# Patient Record
Sex: FEMALE | Race: WHITE | NOT HISPANIC OR LATINO | URBAN - METROPOLITAN AREA
[De-identification: names, ages, dates, MRNs, and addresses within clinical notes are randomized per-mention and may not be internally consistent; named-entity substitution may affect disease eponyms.]

---

## 2018-02-18 ENCOUNTER — INPATIENT (INPATIENT)
Facility: HOSPITAL | Age: 78
LOS: 7 days | Discharge: HOME HEALTH | End: 2018-02-26
Attending: SURGERY

## 2018-02-18 VITALS
HEART RATE: 85 BPM | TEMPERATURE: 97 F | DIASTOLIC BLOOD PRESSURE: 67 MMHG | OXYGEN SATURATION: 95 % | RESPIRATION RATE: 20 BRPM | SYSTOLIC BLOOD PRESSURE: 149 MMHG

## 2018-02-18 DIAGNOSIS — Z98.890 OTHER SPECIFIED POSTPROCEDURAL STATES: Chronic | ICD-10-CM

## 2018-02-18 DIAGNOSIS — Z93.1 GASTROSTOMY STATUS: Chronic | ICD-10-CM

## 2018-02-18 LAB
ALBUMIN SERPL ELPH-MCNC: 3.8 G/DL — SIGNIFICANT CHANGE UP (ref 3–5.5)
ALP SERPL-CCNC: 116 U/L — HIGH (ref 30–115)
ALT FLD-CCNC: 22 U/L — SIGNIFICANT CHANGE UP (ref 0–41)
ANION GAP SERPL CALC-SCNC: 10 MMOL/L — SIGNIFICANT CHANGE UP (ref 7–14)
ANION GAP SERPL CALC-SCNC: 15 MMOL/L — HIGH (ref 7–14)
APTT BLD: 25 SEC — LOW (ref 27–39.2)
APTT BLD: 28.8 SEC — SIGNIFICANT CHANGE UP (ref 27–39.2)
AST SERPL-CCNC: 45 U/L — HIGH (ref 0–41)
BASE EXCESS BLDV CALC-SCNC: -2.8 MMOL/L — LOW (ref -2–2)
BASOPHILS # BLD AUTO: 0.04 K/UL — SIGNIFICANT CHANGE UP (ref 0–0.2)
BASOPHILS NFR BLD AUTO: 0.2 % — SIGNIFICANT CHANGE UP (ref 0–1)
BILIRUB SERPL-MCNC: 1.4 MG/DL — HIGH (ref 0.2–1.2)
BUN SERPL-MCNC: 11 MG/DL — SIGNIFICANT CHANGE UP (ref 10–20)
BUN SERPL-MCNC: 24 MG/DL — HIGH (ref 10–20)
CA-I SERPL-SCNC: 1.15 MMOL/L — SIGNIFICANT CHANGE UP (ref 1.12–1.3)
CALCIUM SERPL-MCNC: 8.2 MG/DL — LOW (ref 8.5–10.1)
CALCIUM SERPL-MCNC: 9.4 MG/DL — SIGNIFICANT CHANGE UP (ref 8.5–10.1)
CHLORIDE SERPL-SCNC: 102 MMOL/L — SIGNIFICANT CHANGE UP (ref 98–110)
CHLORIDE SERPL-SCNC: 107 MMOL/L — SIGNIFICANT CHANGE UP (ref 98–110)
CK MB CFR SERPL CALC: 1.5 NG/ML — SIGNIFICANT CHANGE UP (ref 0.6–6.3)
CO2 SERPL-SCNC: 19 MMOL/L — SIGNIFICANT CHANGE UP (ref 17–32)
CO2 SERPL-SCNC: 21 MMOL/L — SIGNIFICANT CHANGE UP (ref 17–32)
CREAT SERPL-MCNC: 0.7 MG/DL — SIGNIFICANT CHANGE UP (ref 0.7–1.5)
CREAT SERPL-MCNC: 0.9 MG/DL — SIGNIFICANT CHANGE UP (ref 0.7–1.5)
EOSINOPHIL # BLD AUTO: 0 K/UL — SIGNIFICANT CHANGE UP (ref 0–0.7)
EOSINOPHIL NFR BLD AUTO: 0 % — SIGNIFICANT CHANGE UP (ref 0–8)
GAS PNL BLDV: 140 MMOL/L — SIGNIFICANT CHANGE UP (ref 136–145)
GAS PNL BLDV: SIGNIFICANT CHANGE UP
GAS PNL BLDV: SIGNIFICANT CHANGE UP
GLUCOSE SERPL-MCNC: 160 MG/DL — HIGH (ref 70–110)
GLUCOSE SERPL-MCNC: 164 MG/DL — HIGH (ref 70–110)
HCO3 BLDV-SCNC: 24 MMOL/L — SIGNIFICANT CHANGE UP (ref 22–29)
HCT VFR BLD CALC: 26.7 % — LOW (ref 37–47)
HCT VFR BLD CALC: 33.4 % — LOW (ref 37–47)
HGB BLD CALC-MCNC: 10.3 G/DL — LOW (ref 14–18)
HGB BLD-MCNC: 10.6 G/DL — LOW (ref 14–18)
HGB BLD-MCNC: 8.6 G/DL — LOW (ref 14–18)
IMM GRANULOCYTES NFR BLD AUTO: 0.5 % — HIGH (ref 0.1–0.3)
INR BLD: 1.49 RATIO — HIGH (ref 0.65–1.3)
INR BLD: 2.33 RATIO — HIGH (ref 0.65–1.3)
LACTATE BLDV-MCNC: 4.8 MMOL/L — HIGH (ref 0.5–1.6)
LACTATE SERPL-SCNC: 4.8 MMOL/L — CRITICAL HIGH (ref 0.5–2.2)
LIDOCAIN IGE QN: 13 U/L — SIGNIFICANT CHANGE UP (ref 7–60)
LYMPHOCYTES # BLD AUTO: 1.91 K/UL — SIGNIFICANT CHANGE UP (ref 1.2–3.4)
LYMPHOCYTES # BLD AUTO: 9.7 % — LOW (ref 20.5–51.1)
MAGNESIUM SERPL-MCNC: 1.8 MG/DL — SIGNIFICANT CHANGE UP (ref 1.8–2.4)
MCHC RBC-ENTMCNC: 24.2 PG — LOW (ref 27–31)
MCHC RBC-ENTMCNC: 24.3 PG — LOW (ref 27–31)
MCHC RBC-ENTMCNC: 31.7 G/DL — LOW (ref 32–37)
MCHC RBC-ENTMCNC: 32.2 G/DL — SIGNIFICANT CHANGE UP (ref 32–37)
MCV RBC AUTO: 75.2 FL — LOW (ref 81–91)
MCV RBC AUTO: 76.4 FL — LOW (ref 81–91)
MONOCYTES # BLD AUTO: 1.1 K/UL — HIGH (ref 0.1–0.6)
MONOCYTES NFR BLD AUTO: 5.6 % — SIGNIFICANT CHANGE UP (ref 1.7–9.3)
NEUTROPHILS # BLD AUTO: 16.59 K/UL — HIGH (ref 1.4–6.5)
NEUTROPHILS NFR BLD AUTO: 84 % — HIGH (ref 42.2–75.2)
NRBC # BLD: 0 /100 WBCS — SIGNIFICANT CHANGE UP (ref 0–0)
PCO2 BLDV: 47 MMHG — SIGNIFICANT CHANGE UP (ref 41–51)
PH BLDV: 7.31 — SIGNIFICANT CHANGE UP (ref 7.26–7.43)
PHOSPHATE SERPL-MCNC: 2.6 MG/DL — SIGNIFICANT CHANGE UP (ref 2.1–4.9)
PLATELET # BLD AUTO: 484 K/UL — HIGH (ref 130–400)
PLATELET # BLD AUTO: 602 K/UL — HIGH (ref 130–400)
PO2 BLDV: 30 MMHG — SIGNIFICANT CHANGE UP (ref 20–40)
POTASSIUM BLDV-SCNC: 3.5 MMOL/L — SIGNIFICANT CHANGE UP (ref 3.3–5.6)
POTASSIUM SERPL-MCNC: 3.3 MMOL/L — LOW (ref 3.5–5)
POTASSIUM SERPL-MCNC: 5.4 MMOL/L — HIGH (ref 3.5–5)
POTASSIUM SERPL-SCNC: 3.3 MMOL/L — LOW (ref 3.5–5)
POTASSIUM SERPL-SCNC: 5.4 MMOL/L — HIGH (ref 3.5–5)
PROT SERPL-MCNC: 6.9 G/DL — SIGNIFICANT CHANGE UP (ref 6–8)
PROTHROM AB SERPL-ACNC: 16.2 SEC — HIGH (ref 9.95–12.87)
PROTHROM AB SERPL-ACNC: 25.6 SEC — HIGH (ref 9.95–12.87)
RBC # BLD: 3.55 M/UL — LOW (ref 4.2–5.4)
RBC # BLD: 4.37 M/UL — SIGNIFICANT CHANGE UP (ref 4.2–5.4)
RBC # FLD: 18.8 % — HIGH (ref 11.5–14.5)
RBC # FLD: 19 % — HIGH (ref 11.5–14.5)
SAO2 % BLDV: 43 % — SIGNIFICANT CHANGE UP
SODIUM SERPL-SCNC: 136 MMOL/L — SIGNIFICANT CHANGE UP (ref 135–146)
SODIUM SERPL-SCNC: 138 MMOL/L — SIGNIFICANT CHANGE UP (ref 135–146)
TROPONIN I SERPL-MCNC: <0.02 NG/ML — SIGNIFICANT CHANGE UP (ref 0–0.05)
TYPE + AB SCN PNL BLD: SIGNIFICANT CHANGE UP
WBC # BLD: 18.36 K/UL — HIGH (ref 4.8–10.8)
WBC # BLD: 19.74 K/UL — HIGH (ref 4.8–10.8)
WBC # FLD AUTO: 18.36 K/UL — HIGH (ref 4.8–10.8)
WBC # FLD AUTO: 19.74 K/UL — HIGH (ref 4.8–10.8)

## 2018-02-18 RX ORDER — CEFAZOLIN SODIUM 1 G
1000 VIAL (EA) INJECTION ONCE
Qty: 0 | Refills: 0 | Status: COMPLETED | OUTPATIENT
Start: 2018-02-19 | End: 2018-02-19

## 2018-02-18 RX ORDER — METOPROLOL TARTRATE 50 MG
1 TABLET ORAL
Qty: 0 | Refills: 0 | COMMUNITY

## 2018-02-18 RX ORDER — ASPIRIN/CALCIUM CARB/MAGNESIUM 324 MG
325 TABLET ORAL ONCE
Qty: 0 | Refills: 0 | Status: COMPLETED | OUTPATIENT
Start: 2018-02-18 | End: 2018-02-18

## 2018-02-18 RX ORDER — MEMANTINE HYDROCHLORIDE 10 MG/1
0 TABLET ORAL
Qty: 0 | Refills: 0 | COMMUNITY

## 2018-02-18 RX ORDER — TRAZODONE HCL 50 MG
50 TABLET ORAL THREE TIMES A DAY
Qty: 0 | Refills: 0 | Status: DISCONTINUED | OUTPATIENT
Start: 2018-02-19 | End: 2018-02-20

## 2018-02-18 RX ORDER — PHYTONADIONE (VIT K1) 5 MG
10 TABLET ORAL ONCE
Qty: 0 | Refills: 0 | Status: COMPLETED | OUTPATIENT
Start: 2018-02-18 | End: 2018-02-18

## 2018-02-18 RX ORDER — SODIUM CHLORIDE 9 MG/ML
1000 INJECTION INTRAMUSCULAR; INTRAVENOUS; SUBCUTANEOUS
Qty: 0 | Refills: 0 | Status: DISCONTINUED | OUTPATIENT
Start: 2018-02-18 | End: 2018-02-23

## 2018-02-18 RX ORDER — SERTRALINE 25 MG/1
1 TABLET, FILM COATED ORAL
Qty: 0 | Refills: 0 | COMMUNITY

## 2018-02-18 RX ORDER — LEVOTHYROXINE SODIUM 125 MCG
25 TABLET ORAL AT BEDTIME
Qty: 0 | Refills: 0 | Status: DISCONTINUED | OUTPATIENT
Start: 2018-02-18 | End: 2018-02-21

## 2018-02-18 RX ORDER — METOPROLOL TARTRATE 50 MG
5 TABLET ORAL EVERY 6 HOURS
Qty: 0 | Refills: 0 | Status: DISCONTINUED | OUTPATIENT
Start: 2018-02-18 | End: 2018-02-20

## 2018-02-18 RX ORDER — HEPARIN SODIUM 5000 [USP'U]/ML
900 INJECTION INTRAVENOUS; SUBCUTANEOUS
Qty: 25000 | Refills: 0 | Status: DISCONTINUED | OUTPATIENT
Start: 2018-02-19 | End: 2018-02-19

## 2018-02-18 RX ORDER — LEVETIRACETAM 250 MG/1
1 TABLET, FILM COATED ORAL
Qty: 0 | Refills: 0 | COMMUNITY

## 2018-02-18 RX ORDER — ONDANSETRON 8 MG/1
4 TABLET, FILM COATED ORAL ONCE
Qty: 0 | Refills: 0 | Status: COMPLETED | OUTPATIENT
Start: 2018-02-18 | End: 2018-02-18

## 2018-02-18 RX ORDER — VALSARTAN 80 MG/1
1 TABLET ORAL
Qty: 0 | Refills: 0 | COMMUNITY

## 2018-02-18 RX ORDER — SODIUM CHLORIDE 9 MG/ML
1000 INJECTION INTRAMUSCULAR; INTRAVENOUS; SUBCUTANEOUS
Qty: 0 | Refills: 0 | Status: DISCONTINUED | OUTPATIENT
Start: 2018-02-18 | End: 2018-02-18

## 2018-02-18 RX ORDER — LEVOTHYROXINE SODIUM 125 MCG
1 TABLET ORAL
Qty: 0 | Refills: 0 | COMMUNITY

## 2018-02-18 RX ORDER — SODIUM CHLORIDE 9 MG/ML
1000 INJECTION INTRAMUSCULAR; INTRAVENOUS; SUBCUTANEOUS ONCE
Qty: 0 | Refills: 0 | Status: COMPLETED | OUTPATIENT
Start: 2018-02-18 | End: 2018-02-18

## 2018-02-18 RX ORDER — PANTOPRAZOLE SODIUM 20 MG/1
40 TABLET, DELAYED RELEASE ORAL DAILY
Qty: 0 | Refills: 0 | Status: DISCONTINUED | OUTPATIENT
Start: 2018-02-18 | End: 2018-02-21

## 2018-02-18 RX ORDER — LEVETIRACETAM 250 MG/1
1000 TABLET, FILM COATED ORAL EVERY 12 HOURS
Qty: 0 | Refills: 0 | Status: DISCONTINUED | OUTPATIENT
Start: 2018-02-18 | End: 2018-02-21

## 2018-02-18 RX ORDER — FESOTERODINE FUMARATE 8 MG/1
1 TABLET, FILM COATED, EXTENDED RELEASE ORAL
Qty: 0 | Refills: 0 | COMMUNITY

## 2018-02-18 RX ORDER — MORPHINE SULFATE 50 MG/1
2 CAPSULE, EXTENDED RELEASE ORAL
Qty: 0 | Refills: 0 | Status: DISCONTINUED | OUTPATIENT
Start: 2018-02-18 | End: 2018-02-19

## 2018-02-18 RX ORDER — FAMOTIDINE 10 MG/ML
1 INJECTION INTRAVENOUS
Qty: 0 | Refills: 0 | COMMUNITY

## 2018-02-18 RX ORDER — ASPIRIN/CALCIUM CARB/MAGNESIUM 324 MG
0 TABLET ORAL
Qty: 0 | Refills: 0 | COMMUNITY

## 2018-02-18 RX ORDER — SODIUM CHLORIDE 9 MG/ML
1000 INJECTION INTRAMUSCULAR; INTRAVENOUS; SUBCUTANEOUS
Qty: 0 | Refills: 0 | Status: DISCONTINUED | OUTPATIENT
Start: 2018-02-18 | End: 2018-02-19

## 2018-02-18 RX ORDER — MORPHINE SULFATE 50 MG/1
4 CAPSULE, EXTENDED RELEASE ORAL ONCE
Qty: 0 | Refills: 0 | Status: DISCONTINUED | OUTPATIENT
Start: 2018-02-18 | End: 2018-02-18

## 2018-02-18 RX ORDER — MIRTAZAPINE 45 MG/1
0 TABLET, ORALLY DISINTEGRATING ORAL
Qty: 0 | Refills: 0 | COMMUNITY

## 2018-02-18 RX ORDER — HYDROMORPHONE HYDROCHLORIDE 2 MG/ML
0.5 INJECTION INTRAMUSCULAR; INTRAVENOUS; SUBCUTANEOUS
Qty: 0 | Refills: 0 | Status: DISCONTINUED | OUTPATIENT
Start: 2018-02-18 | End: 2018-02-19

## 2018-02-18 RX ORDER — TRAZODONE HCL 50 MG
1 TABLET ORAL
Qty: 0 | Refills: 0 | COMMUNITY

## 2018-02-18 RX ORDER — HEPARIN SODIUM 5000 [USP'U]/ML
5000 INJECTION INTRAVENOUS; SUBCUTANEOUS EVERY 8 HOURS
Qty: 0 | Refills: 0 | Status: DISCONTINUED | OUTPATIENT
Start: 2018-02-18 | End: 2018-02-18

## 2018-02-18 RX ADMIN — ONDANSETRON 4 MILLIGRAM(S): 8 TABLET, FILM COATED ORAL at 09:20

## 2018-02-18 RX ADMIN — ONDANSETRON 4 MILLIGRAM(S): 8 TABLET, FILM COATED ORAL at 08:48

## 2018-02-18 RX ADMIN — SODIUM CHLORIDE 125 MILLILITER(S): 9 INJECTION INTRAMUSCULAR; INTRAVENOUS; SUBCUTANEOUS at 17:29

## 2018-02-18 RX ADMIN — SODIUM CHLORIDE 100 MILLILITER(S): 9 INJECTION INTRAMUSCULAR; INTRAVENOUS; SUBCUTANEOUS at 23:10

## 2018-02-18 RX ADMIN — Medication 102 MILLIGRAM(S): at 17:44

## 2018-02-18 RX ADMIN — SODIUM CHLORIDE 1000 MILLILITER(S): 9 INJECTION INTRAMUSCULAR; INTRAVENOUS; SUBCUTANEOUS at 08:48

## 2018-02-18 RX ADMIN — Medication 5 MILLIGRAM(S): at 23:56

## 2018-02-18 RX ADMIN — SODIUM CHLORIDE 2000 MILLILITER(S): 9 INJECTION INTRAMUSCULAR; INTRAVENOUS; SUBCUTANEOUS at 15:30

## 2018-02-18 RX ADMIN — SODIUM CHLORIDE 125 MILLILITER(S): 9 INJECTION INTRAMUSCULAR; INTRAVENOUS; SUBCUTANEOUS at 18:00

## 2018-02-18 RX ADMIN — Medication 325 MILLIGRAM(S): at 11:16

## 2018-02-18 RX ADMIN — SODIUM CHLORIDE 2000 MILLILITER(S): 9 INJECTION INTRAMUSCULAR; INTRAVENOUS; SUBCUTANEOUS at 11:30

## 2018-02-18 NOTE — ED PROVIDER NOTE - CARE PLAN
Principal Discharge DX:	Small bowel obstruction  Secondary Diagnosis:	ST segment abnormality  Secondary Diagnosis:	Dehydration

## 2018-02-18 NOTE — BRIEF OPERATIVE NOTE - OPERATION/FINDINGS
adhesion band was causing complete small bowel obstruction with congested segment of small bowel that improved after lysis of the adhesive band and didn't require resection.   500 cc of hemorraghic fluid was drained

## 2018-02-18 NOTE — H&P ADULT - ASSESSMENT
77 yo F with abdominal pain and vomiting concern for adhesive SBO with ischemic bowel    - Admit to surgery, Dr. Palumbo  - NPO, NGT placed with 200cc out, Luna placed  - IV fluids x 3 liters  - Reversal of anticoagulation with Vitamin k and Bebulin (25U/Kg = 25 x 43 = 1200units)  - FFP and Platelets for asa/plavix  - Serial abdominal exams  - Plan for OR after resuscitation  - D/W Dr. Palumbo

## 2018-02-18 NOTE — ED PROVIDER NOTE - PMH
Atrial fibrillation, unspecified type    Cerebrovascular accident (CVA), unspecified mechanism    Diabetes    Hypertension, unspecified type    Hypothyroidism, unspecified type

## 2018-02-18 NOTE — CONSULT NOTE ADULT - ATTENDING COMMENTS
Patient was seen and examined. Discussed with the cardiology fellow.  I agree with the findings and plan as documented by the fellow.  No evidence of STEMI.  Suspect demand ischemia.  C/w antiplatelets.  BP control.  DM control.  Repeat troponins.  Surgery f/u for SBO management.

## 2018-02-18 NOTE — ED PROVIDER NOTE - OBJECTIVE STATEMENT
78 F presents for multiple episodes of nausea, vomiting and diarrhea over the past 2 days. reports she is experiencing upper abd pain with the vomiting. nb/nb emesis.  no melena.  some red blood mixed with stool. no history of gi bleed.  history of peg tube that was removed within the two years following a stroke.  no fevers. no sick contacts. 26-Apr-2017 15:19

## 2018-02-18 NOTE — ED ADULT NURSE NOTE - PMH
Atrial fibrillation, unspecified type    Cerebrovascular accident (CVA), unspecified mechanism    Dementia    Diabetes    High cholesterol    Hypertension, unspecified type    Hypothyroidism, unspecified type    Seizures

## 2018-02-18 NOTE — ED PROVIDER NOTE - ATTENDING CONTRIBUTION TO CARE
78yoF with h/o CVA and afib on warfarin p/w vomiting NBNB and diarrhea watery/brown mixed with blood since yesterday, also reports RLQ abd pain. Abx 1 month ago for dental procedure. On exam, afebrile, hemodynamically stable, NAD, head NCAT, EOMI grossly, MMM with some blood at lip (from bitten lip per daugher, none in oropharynx), tachy, reg rhythm, nml S1/S2, no m/r/g, lungs CTAB, no w/r/r, abd diffusely TTP, ND, decreased BS, alert, CN's 3-12 grossly intact, HANNA spontaneously, no leg cyanosis or edema, skin dry, no rashes or hives, hemoccult negative. 78yoF with h/o CVA and afib on warfarin p/w vomiting NBNB and diarrhea watery/brown mixed with blood since yesterday, also reports RLQ abd pain. Abx 1 month ago for dental procedure. On exam, afebrile, hemodynamically stable, NAD, head NCAT, EOMI grossly, MMM with some blood at lip (from bitten lip per daugher, none in oropharynx), tachy, reg rhythm, nml S1/S2, no m/r/g, lungs CTAB, no w/r/r, abd diffusely TTP, ND, decreased BS, alert, CN's 3-12 grossly intact, HANNA spontaneously, no leg cyanosis or edema, skin dry, no rashes or hives, hemoccult negative. Noted ST elevation in aVR with ST depressions, also reports CP, code STEMI called, d/w cardiology who stated ASA alone at this time. CT abd shows SBO. Given ASA, morphine, Zofran, 2L NS. 78yoF with h/o CVA and afib on warfarin p/w vomiting NBNB and diarrhea watery/brown mixed with blood since yesterday, also reports RLQ abd pain. Abx 1 month ago for dental procedure. On exam, afebrile, hemodynamically stable, NAD, head NCAT, EOMI grossly, MMM with some blood at lip (from bitten lip per daugher, none in oropharynx), tachy, reg rhythm, nml S1/S2, no m/r/g, lungs CTAB, no w/r/r, abd diffusely TTP, ND, decreased BS, alert, CN's 3-12 grossly intact, HANNA spontaneously, no leg cyanosis or edema, skin dry, no rashes or hives, hemoccult negative. Noted ST elevation in aVR with ST depressions, also reports CP, code STEMI called, d/w cardiology who stated ASA alone at this time. CT abd shows SBO. Given ASA, morphine, Zofran, 2L NS. Admitted to surgical service to the OR, are aware of ECG findings as well. Patient hemodynamically stable, well appearing.

## 2018-02-18 NOTE — ED PROVIDER NOTE - MEDICAL DECISION MAKING DETAILS
See attending note  Other diagnoses: leukocytosis, ST segment abnormality concerning for NSTEMI, HTN

## 2018-02-18 NOTE — ED PROVIDER NOTE - PROGRESS NOTE DETAILS
patient with ischemic appearing ekg.   stemi code called.  patient will be given aspirin patient evaluated by dr miller who discussed with dr shipley.  stemi code cancelled.  will medically manage with asa for now.  ct result pending.  will admit to ccu

## 2018-02-18 NOTE — CONSULT NOTE ADULT - SUBJECTIVE AND OBJECTIVE BOX
CHIEF COMPLAINT: Abd pain, vomiting	    HISTORY OF PRESENT ILLNESS: 77 yo F p/w Cx/s of abd. pain since 3 days, assc with nausea, vomiting and anorexia. Patient has a hx of stroke, DM and has residual L hemiparesis. Patient was brought by family to ED for further eval. Upon arrival in ED, was assessed by ED physician who was concerned for EKG changes and activated STEMI code, was cancelled by myself and STEMI attending. Patient has mild retro-sternal chest pain, cannot describe it further, poor functional statu at baseline and has home health aide who assists patient with ADL's. Patient has not seen a cardiologist in recent years, accd to family at beside was seen by cardiologist as OP in [place] approx 2 years ago, had possible cardiac cath however family not sure of extent of workup.       PAST MEDICAL & SURGICAL HISTORY:  Seizures  Dementia  High cholesterol  Hypothyroidism, unspecified type  Hypertension, unspecified type  Diabetes  Cerebrovascular accident (CVA), unspecified mechanism  Atrial fibrillation, unspecified type  S/P percutaneous endoscopic gastrostomy (PEG) tube placement: s/p removal of peg tube    FAMILY HISTORY:    Allergies    No Known Allergies    Intolerances    	  Home Medications:  aspirin 81 mg oral tablet, chewable:  (18 Feb 2018 08:46)  Diovan 160 mg oral tablet: 1 tab(s) orally once a day (18 Feb 2018 08:46)  Januvia:  (18 Feb 2018 08:46)  levETIRAcetam 1000 mg oral tablet: 1 tab(s) orally 2 times a day (18 Feb 2018 08:46)  levothyroxine 50 mcg (0.05 mg) oral tablet: 1 tab(s) orally once a day (18 Feb 2018 08:46)  Lipitor:  (18 Feb 2018 08:46)  metoprolol tartrate 50 mg oral tablet: 1 tab(s) orally 2 times a day (18 Feb 2018 08:46)  mirtazapine:  (18 Feb 2018 08:46)  Namenda:  (18 Feb 2018 08:46)  omeprazole 20 mg oral delayed release tablet: 1 tab(s) orally once a day (18 Feb 2018 08:46)  Pepcid 20 mg oral tablet: 1 tab(s) orally 2 times a day (18 Feb 2018 08:46)  Plavix: 75 milligram(s) orally (18 Feb 2018 08:46)  Toviaz 8 mg oral tablet, extended release: 1 tab(s) orally once a day (18 Feb 2018 08:46)  traZODone 50 mg oral tablet: 1 tab(s) orally 3 times a day (18 Feb 2018 08:46)  Zoloft 100 mg oral tablet: 1 tab(s) orally once a day (18 Feb 2018 08:46)    MEDICATIONS  (STANDING):    MEDICATIONS  (PRN):        SOCIAL HISTORY:    [ ] Non-smoker  [ ] Alcohol      REVIEW OF SYSTEMS:  14 point review of systems negative except as mentioned above in HPI.     PHYSICAL EXAM:  T(C): 36.6 (02-18-18 @ 08:09), Max: 36.6 (02-18-18 @ 08:09)  HR: 105 (02-18-18 @ 11:15) (85 - 105)  BP: 179/84 (02-18-18 @ 11:15) (143/70 - 179/84)  RR: 18 (02-18-18 @ 11:15) (18 - 20)  SpO2: 97% (02-18-18 @ 11:15) (95% - 97%)  Wt(kg): --  I&O's Summary    Daily     Daily     General Appearance: Normal	  Cardiovascular: Normal S1 S2, No JVD, No murmurs, No edema  Respiratory: Lungs clear to auscultation	  Psychiatry: A & O x 3, Mood & affect appropriate  Gastrointestinal:  Soft, generalized tenderness elicited upon palpation  Skin: No rashes, No ecchymoses, No cyanosis	  Neurologic: L sided hemiparesis known from before  Extremities: No clubbing, cyanosis or edema  Vascular: Peripheral pulses palpable 2+ bilaterally        LABS:	 	                        10.6   19.74 )-----------( 602      ( 18 Feb 2018 07:59 )             33.4     02-18    136  |  102  |  24<H>  ----------------------------<  164<H>  5.4<H>   |  19  |  0.9    Ca    9.4      18 Feb 2018 07:59    TPro  6.9  /  Alb  3.8  /  TBili  1.4<H>  /  DBili  x   /  AST  45<H>  /  ALT  22  /  AlkPhos  116<H>  02-18      proBNP:   Lipid Profile:   HgA1c:   TSH:       CARDIAC MARKERS:  Troponin I, Serum: <0.02 ng/mL (02-18 @ 07:59)            TELEMETRY EVENTS: 	    ECG:  	ST depressions in v4-v6  RADIOLOGY:  < from: CT Angio Abdomen and Pelvis w/ IV Cont (02.18.18 @ 10:47) >  IMPRESSION:     Dilated fluid-filled small bowel loops measure up to 3.2 cm with   transition point noted within the left lower quadrant and mild interloop   ascites.. Findings are compatible with smallbowel obstruction.    No evidence of ischemic colitis.    < end of copied text >      	    PREVIOUS DIAGNOSTIC TESTING:    [ ] Echocardiogram:  [ ]  Catheterization:  [ ] Stress Test:

## 2018-02-18 NOTE — H&P ADULT - HISTORY OF PRESENT ILLNESS
77yo F presents with 1 days h/o abdominal pain and vomiting. No bowel function. H/o hysterectomy and PEG placement s/p stroke in 2016. Currently on ASA/Plavix/Coumadin. No chest pain, shortness of breath. Bloody diarrhea 2 days ago. afebrile.

## 2018-02-18 NOTE — H&P ADULT - ATTENDING COMMENTS
Mesenteric ischemia from SBO in elderly complex medical patient    needs reversal of INR, some platelets and OR

## 2018-02-18 NOTE — ED ADULT NURSE REASSESSMENT NOTE - NS ED NURSE REASSESS COMMENT FT1
stroke code initiated; pt remains on CM as ordered, , RR 18, /84, O2 97% on RA; MD Ortiz notified. pt placed on 2L O2 via NC as ordered. medications given as ordered. pt remains at bedside. cardiology at bedside assessing pt at this time.

## 2018-02-18 NOTE — ED PROVIDER NOTE - CRITICAL CARE PROVIDED
interpretation of diagnostic studies/consult w/ pt's family directly relating to pts condition/consultation with other physicians/telephone consultation with the patient's family/documentation/direct patient care (not related to procedure)/additional history taking

## 2018-02-18 NOTE — CHART NOTE - NSCHARTNOTEFT_GEN_A_CORE
STEMI code called by ER attending. I personally examined and assessed patient, patient does not require emergent catheterization. Advised to ED to admit patient for further medical workup. Case d/w Dr. Lawrence who agrees with plan. Further detailed note to follow as formal consult.

## 2018-02-18 NOTE — H&P ADULT - PSH
H/O abdominal hysterectomy    S/P percutaneous endoscopic gastrostomy (PEG) tube placement  s/p removal of peg tube

## 2018-02-18 NOTE — CONSULT NOTE ADULT - ASSESSMENT
1. Abd. pain  2. SBO  3. Hx of Stroke  4. lactic acidosis  5. leukocytosis    -admit to telemetry  -f/u serial troponins q4-6 hours to r/o ACS  -c/w ASA, plavix, BB, statin  -f/u TTE to assess LVEF  -optimize BP control, do not aggressively lower BP, maintain BP approx to 150/90  -request surgical eval. for management of SBO.

## 2018-02-18 NOTE — BRIEF OPERATIVE NOTE - PROCEDURE
<<-----Click on this checkbox to enter Procedure Lysis of adhesions for small bowel obstruction  02/18/2018    Active  PABOTAGA  Exploratory laparotomy for bowel obstruction  02/18/2018    Active  PABOTAGA

## 2018-02-19 LAB
ANION GAP SERPL CALC-SCNC: 8 MMOL/L — SIGNIFICANT CHANGE UP (ref 7–14)
APTT BLD: 29.6 SEC — SIGNIFICANT CHANGE UP (ref 27–39.2)
BASOPHILS # BLD AUTO: 0.01 K/UL — SIGNIFICANT CHANGE UP (ref 0–0.2)
BASOPHILS NFR BLD AUTO: 0.1 % — SIGNIFICANT CHANGE UP (ref 0–1)
BUN SERPL-MCNC: 10 MG/DL — SIGNIFICANT CHANGE UP (ref 10–20)
CALCIUM SERPL-MCNC: 8 MG/DL — LOW (ref 8.5–10.1)
CHLORIDE SERPL-SCNC: 109 MMOL/L — SIGNIFICANT CHANGE UP (ref 98–110)
CO2 SERPL-SCNC: 24 MMOL/L — SIGNIFICANT CHANGE UP (ref 17–32)
CREAT SERPL-MCNC: 0.7 MG/DL — SIGNIFICANT CHANGE UP (ref 0.7–1.5)
EOSINOPHIL # BLD AUTO: 0 K/UL — SIGNIFICANT CHANGE UP (ref 0–0.7)
EOSINOPHIL NFR BLD AUTO: 0 % — SIGNIFICANT CHANGE UP (ref 0–8)
GLUCOSE SERPL-MCNC: 134 MG/DL — HIGH (ref 70–110)
HCT VFR BLD CALC: 19.4 % — LOW (ref 37–47)
HCT VFR BLD CALC: 21.2 % — LOW (ref 37–47)
HGB BLD-MCNC: 6.2 G/DL — CRITICAL LOW (ref 14–18)
HGB BLD-MCNC: 6.7 G/DL — CRITICAL LOW (ref 14–18)
IMM GRANULOCYTES NFR BLD AUTO: 0.3 % — SIGNIFICANT CHANGE UP (ref 0.1–0.3)
INR BLD: 1.12 RATIO — SIGNIFICANT CHANGE UP (ref 0.65–1.3)
LYMPHOCYTES # BLD AUTO: 0.9 K/UL — LOW (ref 1.2–3.4)
LYMPHOCYTES # BLD AUTO: 10.4 % — LOW (ref 20.5–51.1)
MAGNESIUM SERPL-MCNC: 1.7 MG/DL — LOW (ref 1.8–2.4)
MCHC RBC-ENTMCNC: 24.5 PG — LOW (ref 27–31)
MCHC RBC-ENTMCNC: 24.7 PG — LOW (ref 27–31)
MCHC RBC-ENTMCNC: 31.6 G/DL — LOW (ref 32–37)
MCHC RBC-ENTMCNC: 32 G/DL — SIGNIFICANT CHANGE UP (ref 32–37)
MCV RBC AUTO: 77.3 FL — LOW (ref 81–91)
MCV RBC AUTO: 77.4 FL — LOW (ref 81–91)
MONOCYTES # BLD AUTO: 0.81 K/UL — HIGH (ref 0.1–0.6)
MONOCYTES NFR BLD AUTO: 9.3 % — SIGNIFICANT CHANGE UP (ref 1.7–9.3)
NEUTROPHILS # BLD AUTO: 6.92 K/UL — HIGH (ref 1.4–6.5)
NEUTROPHILS NFR BLD AUTO: 79.9 % — HIGH (ref 42.2–75.2)
NRBC # BLD: 0 /100 WBCS — SIGNIFICANT CHANGE UP (ref 0–0)
NRBC # BLD: 0 /100 WBCS — SIGNIFICANT CHANGE UP (ref 0–0)
PLATELET # BLD AUTO: 368 K/UL — SIGNIFICANT CHANGE UP (ref 130–400)
PLATELET # BLD AUTO: 429 K/UL — HIGH (ref 130–400)
POTASSIUM SERPL-MCNC: 3.6 MMOL/L — SIGNIFICANT CHANGE UP (ref 3.5–5)
POTASSIUM SERPL-SCNC: 3.6 MMOL/L — SIGNIFICANT CHANGE UP (ref 3.5–5)
PROTHROM AB SERPL-ACNC: 12.1 SEC — SIGNIFICANT CHANGE UP (ref 9.95–12.87)
RBC # BLD: 2.51 M/UL — LOW (ref 4.2–5.4)
RBC # BLD: 2.74 M/UL — LOW (ref 4.2–5.4)
RBC # FLD: 19.5 % — HIGH (ref 11.5–14.5)
RBC # FLD: 19.6 % — HIGH (ref 11.5–14.5)
SODIUM SERPL-SCNC: 141 MMOL/L — SIGNIFICANT CHANGE UP (ref 135–146)
WBC # BLD: 15.15 K/UL — HIGH (ref 4.8–10.8)
WBC # BLD: 8.67 K/UL — SIGNIFICANT CHANGE UP (ref 4.8–10.8)
WBC # FLD AUTO: 15.15 K/UL — HIGH (ref 4.8–10.8)
WBC # FLD AUTO: 8.67 K/UL — SIGNIFICANT CHANGE UP (ref 4.8–10.8)

## 2018-02-19 RX ORDER — ONDANSETRON 8 MG/1
4 TABLET, FILM COATED ORAL ONCE
Qty: 0 | Refills: 0 | Status: COMPLETED | OUTPATIENT
Start: 2018-02-19 | End: 2018-02-19

## 2018-02-19 RX ORDER — MORPHINE SULFATE 50 MG/1
2 CAPSULE, EXTENDED RELEASE ORAL EVERY 4 HOURS
Qty: 0 | Refills: 0 | Status: DISCONTINUED | OUTPATIENT
Start: 2018-02-19 | End: 2018-02-21

## 2018-02-19 RX ORDER — HEPARIN SODIUM 5000 [USP'U]/ML
5000 INJECTION INTRAVENOUS; SUBCUTANEOUS EVERY 8 HOURS
Qty: 0 | Refills: 0 | Status: DISCONTINUED | OUTPATIENT
Start: 2018-02-19 | End: 2018-02-26

## 2018-02-19 RX ORDER — OXYCODONE AND ACETAMINOPHEN 5; 325 MG/1; MG/1
1 TABLET ORAL EVERY 4 HOURS
Qty: 0 | Refills: 0 | Status: DISCONTINUED | OUTPATIENT
Start: 2018-02-19 | End: 2018-02-20

## 2018-02-19 RX ADMIN — SODIUM CHLORIDE 100 MILLILITER(S): 9 INJECTION INTRAMUSCULAR; INTRAVENOUS; SUBCUTANEOUS at 13:43

## 2018-02-19 RX ADMIN — LEVETIRACETAM 400 MILLIGRAM(S): 250 TABLET, FILM COATED ORAL at 06:25

## 2018-02-19 RX ADMIN — Medication 50 MILLIGRAM(S): at 21:32

## 2018-02-19 RX ADMIN — Medication 25 MICROGRAM(S): at 21:50

## 2018-02-19 RX ADMIN — MORPHINE SULFATE 2 MILLIGRAM(S): 50 CAPSULE, EXTENDED RELEASE ORAL at 10:32

## 2018-02-19 RX ADMIN — Medication 50 MILLIGRAM(S): at 13:43

## 2018-02-19 RX ADMIN — Medication 100 MILLIGRAM(S): at 09:48

## 2018-02-19 RX ADMIN — Medication 5 MILLIGRAM(S): at 10:32

## 2018-02-19 RX ADMIN — SODIUM CHLORIDE 100 MILLILITER(S): 9 INJECTION INTRAMUSCULAR; INTRAVENOUS; SUBCUTANEOUS at 00:48

## 2018-02-19 RX ADMIN — MORPHINE SULFATE 2 MILLIGRAM(S): 50 CAPSULE, EXTENDED RELEASE ORAL at 09:42

## 2018-02-19 RX ADMIN — HEPARIN SODIUM 900 UNIT(S)/HR: 5000 INJECTION INTRAVENOUS; SUBCUTANEOUS at 07:01

## 2018-02-19 RX ADMIN — MORPHINE SULFATE 2 MILLIGRAM(S): 50 CAPSULE, EXTENDED RELEASE ORAL at 21:51

## 2018-02-19 RX ADMIN — PANTOPRAZOLE SODIUM 40 MILLIGRAM(S): 20 TABLET, DELAYED RELEASE ORAL at 11:47

## 2018-02-19 RX ADMIN — HEPARIN SODIUM 5000 UNIT(S): 5000 INJECTION INTRAVENOUS; SUBCUTANEOUS at 21:32

## 2018-02-19 RX ADMIN — Medication 5 MILLIGRAM(S): at 19:14

## 2018-02-19 RX ADMIN — MORPHINE SULFATE 2 MILLIGRAM(S): 50 CAPSULE, EXTENDED RELEASE ORAL at 19:21

## 2018-02-19 RX ADMIN — LEVETIRACETAM 400 MILLIGRAM(S): 250 TABLET, FILM COATED ORAL at 18:40

## 2018-02-19 RX ADMIN — Medication 5 MILLIGRAM(S): at 06:24

## 2018-02-19 NOTE — PROVIDER CONTACT NOTE (OTHER) - REASON
bloody drainage noted to lower abd dr anderson aware no further interventions needed @ this time per md

## 2018-02-19 NOTE — PROGRESS NOTE ADULT - ASSESSMENT
78F Exploratory laparotomy for bowel obstruction ,Lysis of adhesions for small bowel obstruction.    - NGT IN PLACE,   - F/U AM LABS, TREND CBC.  - RESTARTED ON AC IN AM.

## 2018-02-19 NOTE — PROGRESS NOTE ADULT - SUBJECTIVE AND OBJECTIVE BOX
GENERAL SURGERY PROGRESS NOTE    Hospital Day #: 2  Post-Op Day #: 1    Procedure/Dx: Exploratory laparotomy for bowel obstruction ,Lysis of adhesions for small bowel obstruction     Events of past 24 hours: OR findings adhesion band was causing complete small bowel obstruction with congested segment of small bowel that improved after lysis of the adhesive band and didn't require resection.  500 cc of hemorraghic fluid was drained    PAST MEDICAL & SURGICAL HISTORY:  Seizures  Dementia  High cholesterol  Hypothyroidism, unspecified type  Hypertension, unspecified type  Diabetes  Cerebrovascular accident (CVA), unspecified mechanism  Atrial fibrillation, unspecified type  H/O abdominal hysterectomy  S/P percutaneous endoscopic gastrostomy (PEG) tube placement: s/p removal of peg tube    Vital Signs Last 24 Hrs  T(C): 37.7 (19 Feb 2018 09:52), Max: 37.7 (19 Feb 2018 09:52)  T(F): 99.9 (19 Feb 2018 09:52), Max: 99.9 (19 Feb 2018 09:52)  HR: 88 (19 Feb 2018 09:52) (83 - 107)  BP: 181/84 (19 Feb 2018 09:52) (140/89 - 187/102)  BP(mean): --  RR: 18 (19 Feb 2018 07:40) (15 - 21)  SpO2: 92% (19 Feb 2018 09:52) (92% - 100%)    Pain (0-10):            Pain Control Adequate: [] YES [] N    Diet, NPO:   NPO for Procedure/Test     NPO Start Date: 18-Feb-2018,   NPO Start Time: 00:59  Except Medications      02-18-18 @ 07:01  -  02-19-18 @ 07:00  --------------------------------------------------------  IN:    sodium chloride 0.9%: 100 mL    sodium chloride 0.9%.: 450 mL  Total IN: 550 mL    OUT:    Indwelling Catheter - Urethral: 125 mL  Total OUT: 125 mL    Total NET: 425 mL    Bowel Movement: : [] YES [] NO  Flatus: : [] YES [] NO    PHYSICAL EXAM  GEN: NAD, NGT IN PLACE  ABD: SOFT, MIDLINE INCISION DRESSING C/D/I. MILD TENDERNESS @ INCISION SITE. NO REBOUND NO GUARDING. NO BLEEDING, NO HEMATOMAS.   INCISION: C/D/I    MEDICATIONS  (STANDING):  heparin  Infusion. 900 Unit(s)/Hr (9 mL/Hr) IV Continuous <Continuous>  levETIRAcetam  IVPB 1000 milliGRAM(s) IV Intermittent every 12 hours  levothyroxine Injectable 25 MICROGram(s) IV Push at bedtime  metoprolol    tartrate Injectable 5 milliGRAM(s) IV Push every 6 hours  pantoprazole  Injectable 40 milliGRAM(s) IV Push daily  sodium chloride 0.9%. 1000 milliLiter(s) (100 mL/Hr) IV Continuous <Continuous>  traZODone 50 milliGRAM(s) Oral three times a day    MEDICATIONS  (PRN):  morphine  - Injectable 2 milliGRAM(s) IV Push every 4 hours PRN Severe Pain (7 - 10)  oxyCODONE    5 mG/acetaminophen 325 mG 1 Tablet(s) Oral every 4 hours PRN Moderate Pain (4 - 6)    DVT PROPHYLAXIS: [X] YES [] NO   GI PROPHYLAXIS: [X] YES [] NO   ANTICOAGULATION: [X] YES [] NO   ANTIBIOTICS: [] YES [X] NO       LAB/STUDIES:  CAPILLARY BLOOD GLUCOSE               6.7    15.15 )-----------( 429      ( 19 Feb 2018 07:52 )             21.2     02-18    138  |  107  |  11  ----------------------------<  160<H>  3.3<L>   |  21  |  0.7    Ca    8.2<L>      18 Feb 2018 19:04  Phos  2.6     02-18  Mg     1.8     02-18    TPro  6.9  /  Alb  3.8  /  TBili  1.4<H>  /  DBili  x   /  AST  45<H>  /  ALT  22  /  AlkPhos  116<H>  02-18               6.9  | 1.4  | 45       ------------------[116     ( 18 Feb 2018 07:59 )  3.8  | x    | 22          Lipase:13     Amylase:x        PT/INR - ( 19 Feb 2018 07:52 )   PT: 12.10 sec;   INR: 1.12 ratio         PTT - ( 19 Feb 2018 07:52 )  PTT:29.6 sec    CARDIAC MARKERS ( 18 Feb 2018 07:59 )  <0.02 ng/mL / x     / x     / x     / 1.5 ng/mL

## 2018-02-20 LAB
ANION GAP SERPL CALC-SCNC: 11 MMOL/L — SIGNIFICANT CHANGE UP (ref 7–14)
ANION GAP SERPL CALC-SCNC: 9 MMOL/L — SIGNIFICANT CHANGE UP (ref 7–14)
APTT BLD: 20.4 SEC — CRITICAL LOW (ref 27–39.2)
BASE EXCESS BLDA CALC-SCNC: -4.3 MMOL/L — LOW (ref -2–2)
BASOPHILS # BLD AUTO: 0.02 K/UL — SIGNIFICANT CHANGE UP (ref 0–0.2)
BASOPHILS NFR BLD AUTO: 0.4 % — SIGNIFICANT CHANGE UP (ref 0–1)
BUN SERPL-MCNC: 13 MG/DL — SIGNIFICANT CHANGE UP (ref 10–20)
BUN SERPL-MCNC: 15 MG/DL — SIGNIFICANT CHANGE UP (ref 10–20)
CALCIUM SERPL-MCNC: 7.7 MG/DL — LOW (ref 8.5–10.1)
CALCIUM SERPL-MCNC: 8 MG/DL — LOW (ref 8.5–10.1)
CHLORIDE SERPL-SCNC: 108 MMOL/L — SIGNIFICANT CHANGE UP (ref 98–110)
CHLORIDE SERPL-SCNC: 111 MMOL/L — HIGH (ref 98–110)
CK MB BLD-MCNC: 1 % — SIGNIFICANT CHANGE UP (ref 0–4)
CK MB BLD-MCNC: 1 % — SIGNIFICANT CHANGE UP (ref 0–4)
CK MB CFR SERPL CALC: 3.4 NG/ML — SIGNIFICANT CHANGE UP (ref 0.6–6.3)
CK MB CFR SERPL CALC: 5.1 NG/ML — SIGNIFICANT CHANGE UP (ref 0.6–6.3)
CK SERPL-CCNC: 482 U/L — HIGH (ref 0–225)
CK SERPL-CCNC: 485 U/L — HIGH (ref 0–225)
CO2 SERPL-SCNC: 19 MMOL/L — SIGNIFICANT CHANGE UP (ref 17–32)
CO2 SERPL-SCNC: 19 MMOL/L — SIGNIFICANT CHANGE UP (ref 17–32)
CREAT SERPL-MCNC: 0.8 MG/DL — SIGNIFICANT CHANGE UP (ref 0.7–1.5)
CREAT SERPL-MCNC: 0.8 MG/DL — SIGNIFICANT CHANGE UP (ref 0.7–1.5)
EOSINOPHIL # BLD AUTO: 0.02 K/UL — SIGNIFICANT CHANGE UP (ref 0–0.7)
EOSINOPHIL NFR BLD AUTO: 0.4 % — SIGNIFICANT CHANGE UP (ref 0–8)
GLUCOSE SERPL-MCNC: 117 MG/DL — HIGH (ref 70–110)
GLUCOSE SERPL-MCNC: 131 MG/DL — HIGH (ref 70–110)
HCO3 BLDA-SCNC: 20 MMOL/L — LOW (ref 23–27)
HCT VFR BLD CALC: 21.4 % — LOW (ref 37–47)
HCT VFR BLD CALC: 24.8 % — LOW (ref 37–47)
HGB BLD-MCNC: 6.9 G/DL — CRITICAL LOW (ref 14–18)
HGB BLD-MCNC: 8.3 G/DL — LOW (ref 14–18)
IMM GRANULOCYTES NFR BLD AUTO: 0.4 % — HIGH (ref 0.1–0.3)
INR BLD: 0.92 RATIO — SIGNIFICANT CHANGE UP (ref 0.65–1.3)
LYMPHOCYTES # BLD AUTO: 1.16 K/UL — LOW (ref 1.2–3.4)
LYMPHOCYTES # BLD AUTO: 21.1 % — SIGNIFICANT CHANGE UP (ref 20.5–51.1)
MAGNESIUM SERPL-MCNC: 1.7 MG/DL — LOW (ref 1.8–2.4)
MAGNESIUM SERPL-MCNC: 2.3 MG/DL — SIGNIFICANT CHANGE UP (ref 1.8–2.4)
MCHC RBC-ENTMCNC: 25.8 PG — LOW (ref 27–31)
MCHC RBC-ENTMCNC: 26.9 PG — LOW (ref 27–31)
MCHC RBC-ENTMCNC: 32.2 G/DL — SIGNIFICANT CHANGE UP (ref 32–37)
MCHC RBC-ENTMCNC: 33.5 G/DL — SIGNIFICANT CHANGE UP (ref 32–37)
MCV RBC AUTO: 80.1 FL — LOW (ref 81–91)
MCV RBC AUTO: 80.5 FL — LOW (ref 81–91)
MONOCYTES # BLD AUTO: 1.25 K/UL — HIGH (ref 0.1–0.6)
MONOCYTES NFR BLD AUTO: 22.7 % — HIGH (ref 1.7–9.3)
NEUTROPHILS # BLD AUTO: 3.03 K/UL — SIGNIFICANT CHANGE UP (ref 1.4–6.5)
NEUTROPHILS NFR BLD AUTO: 55 % — SIGNIFICANT CHANGE UP (ref 42.2–75.2)
NRBC # BLD: 0 /100 WBCS — SIGNIFICANT CHANGE UP (ref 0–0)
NRBC # BLD: 0 /100 WBCS — SIGNIFICANT CHANGE UP (ref 0–0)
PCO2 BLDA: 31 MMHG — LOW (ref 38–42)
PH BLDA: 7.42 — SIGNIFICANT CHANGE UP (ref 7.38–7.42)
PHOSPHATE SERPL-MCNC: 2.1 MG/DL — SIGNIFICANT CHANGE UP (ref 2.1–4.9)
PHOSPHATE SERPL-MCNC: 2.7 MG/DL — SIGNIFICANT CHANGE UP (ref 2.1–4.9)
PLATELET # BLD AUTO: 258 K/UL — SIGNIFICANT CHANGE UP (ref 130–400)
PLATELET # BLD AUTO: 277 K/UL — SIGNIFICANT CHANGE UP (ref 130–400)
PO2 BLDA: 66 MMHG — LOW (ref 78–95)
POTASSIUM SERPL-MCNC: 3.1 MMOL/L — LOW (ref 3.5–5)
POTASSIUM SERPL-MCNC: 3.3 MMOL/L — LOW (ref 3.5–5)
POTASSIUM SERPL-SCNC: 3.1 MMOL/L — LOW (ref 3.5–5)
POTASSIUM SERPL-SCNC: 3.3 MMOL/L — LOW (ref 3.5–5)
PROTHROM AB SERPL-ACNC: 9.9 SEC — LOW (ref 9.95–12.87)
RBC # BLD: 2.67 M/UL — LOW (ref 4.2–5.4)
RBC # BLD: 3.08 M/UL — LOW (ref 4.2–5.4)
RBC # FLD: 16.9 % — HIGH (ref 11.5–14.5)
RBC # FLD: 18.1 % — HIGH (ref 11.5–14.5)
SAO2 % BLDA: 95 % — SIGNIFICANT CHANGE UP (ref 94–98)
SODIUM SERPL-SCNC: 138 MMOL/L — SIGNIFICANT CHANGE UP (ref 135–146)
SODIUM SERPL-SCNC: 139 MMOL/L — SIGNIFICANT CHANGE UP (ref 135–146)
TROPONIN I SERPL-MCNC: 0.03 NG/ML — SIGNIFICANT CHANGE UP (ref 0–0.05)
TROPONIN I SERPL-MCNC: 0.17 NG/ML — HIGH (ref 0–0.05)
WBC # BLD: 5.5 K/UL — SIGNIFICANT CHANGE UP (ref 4.8–10.8)
WBC # BLD: 6.37 K/UL — SIGNIFICANT CHANGE UP (ref 4.8–10.8)
WBC # FLD AUTO: 5.5 K/UL — SIGNIFICANT CHANGE UP (ref 4.8–10.8)
WBC # FLD AUTO: 6.37 K/UL — SIGNIFICANT CHANGE UP (ref 4.8–10.8)

## 2018-02-20 RX ORDER — DILTIAZEM HCL 120 MG
10 CAPSULE, EXT RELEASE 24 HR ORAL
Qty: 125 | Refills: 0 | Status: DISCONTINUED | OUTPATIENT
Start: 2018-02-20 | End: 2018-02-20

## 2018-02-20 RX ORDER — MAGNESIUM SULFATE 500 MG/ML
2 VIAL (ML) INJECTION ONCE
Qty: 0 | Refills: 0 | Status: COMPLETED | OUTPATIENT
Start: 2018-02-20 | End: 2018-02-20

## 2018-02-20 RX ORDER — METOPROLOL TARTRATE 50 MG
5 TABLET ORAL ONCE
Qty: 0 | Refills: 0 | Status: COMPLETED | OUTPATIENT
Start: 2018-02-20 | End: 2018-02-20

## 2018-02-20 RX ORDER — METOPROLOL TARTRATE 50 MG
5 TABLET ORAL EVERY 6 HOURS
Qty: 0 | Refills: 0 | Status: DISCONTINUED | OUTPATIENT
Start: 2018-02-20 | End: 2018-02-21

## 2018-02-20 RX ORDER — POTASSIUM CHLORIDE 20 MEQ
40 PACKET (EA) ORAL ONCE
Qty: 0 | Refills: 0 | Status: COMPLETED | OUTPATIENT
Start: 2018-02-20 | End: 2018-02-20

## 2018-02-20 RX ORDER — DILTIAZEM HCL 120 MG
15 CAPSULE, EXT RELEASE 24 HR ORAL
Qty: 125 | Refills: 0 | Status: DISCONTINUED | OUTPATIENT
Start: 2018-02-20 | End: 2018-02-23

## 2018-02-20 RX ORDER — DILTIAZEM HCL 120 MG
5 CAPSULE, EXT RELEASE 24 HR ORAL
Qty: 125 | Refills: 0 | Status: DISCONTINUED | OUTPATIENT
Start: 2018-02-20 | End: 2018-02-20

## 2018-02-20 RX ORDER — METOPROLOL TARTRATE 50 MG
50 TABLET ORAL DAILY
Qty: 0 | Refills: 0 | Status: DISCONTINUED | OUTPATIENT
Start: 2018-02-20 | End: 2018-02-20

## 2018-02-20 RX ORDER — POTASSIUM CHLORIDE 20 MEQ
20 PACKET (EA) ORAL ONCE
Qty: 0 | Refills: 0 | Status: COMPLETED | OUTPATIENT
Start: 2018-02-20 | End: 2018-02-20

## 2018-02-20 RX ORDER — POTASSIUM CHLORIDE 20 MEQ
20 PACKET (EA) ORAL
Qty: 0 | Refills: 0 | Status: COMPLETED | OUTPATIENT
Start: 2018-02-20 | End: 2018-02-20

## 2018-02-20 RX ADMIN — LEVETIRACETAM 400 MILLIGRAM(S): 250 TABLET, FILM COATED ORAL at 07:36

## 2018-02-20 RX ADMIN — Medication 5 MILLIGRAM(S): at 00:07

## 2018-02-20 RX ADMIN — Medication 50 MILLIGRAM(S): at 05:41

## 2018-02-20 RX ADMIN — HEPARIN SODIUM 5000 UNIT(S): 5000 INJECTION INTRAVENOUS; SUBCUTANEOUS at 21:38

## 2018-02-20 RX ADMIN — Medication 5 MILLIGRAM(S): at 23:05

## 2018-02-20 RX ADMIN — Medication 50 GRAM(S): at 21:57

## 2018-02-20 RX ADMIN — PANTOPRAZOLE SODIUM 40 MILLIGRAM(S): 20 TABLET, DELAYED RELEASE ORAL at 11:56

## 2018-02-20 RX ADMIN — SODIUM CHLORIDE 100 MILLILITER(S): 9 INJECTION INTRAMUSCULAR; INTRAVENOUS; SUBCUTANEOUS at 04:47

## 2018-02-20 RX ADMIN — Medication 25 MICROGRAM(S): at 21:38

## 2018-02-20 RX ADMIN — Medication 50 MILLIEQUIVALENT(S): at 14:13

## 2018-02-20 RX ADMIN — HEPARIN SODIUM 5000 UNIT(S): 5000 INJECTION INTRAVENOUS; SUBCUTANEOUS at 14:14

## 2018-02-20 RX ADMIN — Medication 50 GRAM(S): at 07:23

## 2018-02-20 RX ADMIN — Medication 5 MG/HR: at 04:47

## 2018-02-20 RX ADMIN — Medication 5 MILLIGRAM(S): at 18:30

## 2018-02-20 RX ADMIN — Medication 5 MILLIGRAM(S): at 03:58

## 2018-02-20 RX ADMIN — Medication 5 MILLIGRAM(S): at 11:05

## 2018-02-20 RX ADMIN — LEVETIRACETAM 400 MILLIGRAM(S): 250 TABLET, FILM COATED ORAL at 18:31

## 2018-02-20 RX ADMIN — Medication 40 MILLIEQUIVALENT(S): at 23:04

## 2018-02-20 RX ADMIN — Medication 5 MILLIGRAM(S): at 03:59

## 2018-02-20 RX ADMIN — HEPARIN SODIUM 5000 UNIT(S): 5000 INJECTION INTRAVENOUS; SUBCUTANEOUS at 05:42

## 2018-02-20 RX ADMIN — SODIUM CHLORIDE 100 MILLILITER(S): 9 INJECTION INTRAMUSCULAR; INTRAVENOUS; SUBCUTANEOUS at 07:00

## 2018-02-20 RX ADMIN — Medication 50 MILLIEQUIVALENT(S): at 18:32

## 2018-02-20 NOTE — PROGRESS NOTE ADULT - SUBJECTIVE AND OBJECTIVE BOX
SUBJ: Called by surgery resident for patient with rapid heart rate. Patient found to be in Afib.       MEDICATIONS  (STANDING):  diltiazem Injectable 20 milliGRAM(s) IV Push once  heparin  Injectable 5000 Unit(s) SubCutaneous every 8 hours  levETIRAcetam  IVPB 1000 milliGRAM(s) IV Intermittent every 12 hours  levothyroxine Injectable 25 MICROGram(s) IV Push at bedtime  metoprolol    tartrate Injectable 5 milliGRAM(s) IV Push every 6 hours  metoprolol    tartrate Injectable 5 milliGRAM(s) IV Push once  metoprolol    tartrate Injectable 5 milliGRAM(s) IV Push once  pantoprazole  Injectable 40 milliGRAM(s) IV Push daily  sodium chloride 0.9%. 1000 milliLiter(s) (100 mL/Hr) IV Continuous <Continuous>  traZODone 50 milliGRAM(s) Oral three times a day    MEDICATIONS  (PRN):  morphine  - Injectable 2 milliGRAM(s) IV Push every 4 hours PRN Severe Pain (7 - 10)  oxyCODONE    5 mG/acetaminophen 325 mG 1 Tablet(s) Oral every 4 hours PRN Moderate Pain (4 - 6)            Vital Signs Last 24 Hrs  T(C): 35.8 (19 Feb 2018 23:50), Max: 37.7 (19 Feb 2018 09:52)  T(F): 96.4 (19 Feb 2018 23:50), Max: 99.9 (19 Feb 2018 09:52)  HR: 85 (19 Feb 2018 23:50) (77 - 94)  BP: 81/80 (19 Feb 2018 23:50) (81/80 - 187/102)  BP(mean): --  RR: 18 (19 Feb 2018 23:50) (14 - 18)  SpO2: 95% (19 Feb 2018 18:28) (92% - 95%)          PHYSICAL EXAM:  · CONSTITUTIONAL:	Well-developed, well nourished   ·RESPIRATORY:   airway patent; breath sounds equal; good air movement; respirations non-labored; clear to auscultation bilaterally; no chest wall tenderness; no intercostal retractions; no rales,rhonchi or wheeze  · CARDIOVASCULAR	irregular rate and rhythm rapid  no rub  no murmur   · EXTREMITIES: No cyanosis, clubbing or edema  · VASCULAR: 	Equal and normal pulses (carotid, femoral, dorsalis pedis)  	  TELEMETRY: not applicable    ECG:     TTE: 6/2016 Left ventricle: Systolic function was normal. Ejection fraction was estimated   to be 55 %. There were no regional wall motion abnormalities. Wall thickness   was mildly increased. Hypertrophy was noted. Doppler parameters were   consistent with abnormal left ventricular relaxation (grade 1 diastolic   dysfunction).   Mitral valve: There was mild regurgitation.   Left atrium: The atrium was dilated.   Tricuspid valve: There was mild regurgitation.         LABS:                        6.9    6.37  )-----------( 277      ( 20 Feb 2018 00:55 )             21.4     02-19    141  |  109  |  10  ----------------------------<  134<H>  3.6   |  24  |  0.7    Ca    8.0<L>      19 Feb 2018 07:52  Phos  2.6     02-18  Mg     1.7     02-19    TPro  6.9  /  Alb  3.8  /  TBili  1.4<H>  /  DBili  x   /  AST  45<H>  /  ALT  22  /  AlkPhos  116<H>  02-18    CARDIAC MARKERS ( 18 Feb 2018 07:59 )  <0.02 ng/mL / x     / x     / x     / 1.5 ng/mL      PT/INR - ( 19 Feb 2018 07:52 )   PT: 12.10 sec;   INR: 1.12 ratio         PTT - ( 19 Feb 2018 07:52 )  PTT:29.6 sec    I&O's Summary    18 Feb 2018 07:01  -  19 Feb 2018 07:00  --------------------------------------------------------  IN: 550 mL / OUT: 125 mL / NET: 425 mL    19 Feb 2018 07:01  -  20 Feb 2018 03:39  --------------------------------------------------------  IN: 54 mL / OUT: 600 mL / NET: -546 mL      BNP  RADIOLOGY & ADDITIONAL STUDIES: SUBJ: Called by surgery resident for patient with rapid heart rate. Patient found to be in Afib with RVR. Patient offers no other complaints at this time other than some palpitations. Patient was previously seen on 2/18 for "STEMI". Patient went for exlap with Lysis of adhesions and was admitted to general surgical floor. Recommendations at that time were for admission to telemetry with a 2d echo and serial ekgs and cardiac enzymes.       MEDICATIONS  (STANDING):  diltiazem Injectable 20 milliGRAM(s) IV Push once  heparin  Injectable 5000 Unit(s) SubCutaneous every 8 hours  levETIRAcetam  IVPB 1000 milliGRAM(s) IV Intermittent every 12 hours  levothyroxine Injectable 25 MICROGram(s) IV Push at bedtime  metoprolol    tartrate Injectable 5 milliGRAM(s) IV Push every 6 hours  metoprolol    tartrate Injectable 5 milliGRAM(s) IV Push once  metoprolol    tartrate Injectable 5 milliGRAM(s) IV Push once  pantoprazole  Injectable 40 milliGRAM(s) IV Push daily  sodium chloride 0.9%. 1000 milliLiter(s) (100 mL/Hr) IV Continuous <Continuous>  traZODone 50 milliGRAM(s) Oral three times a day    MEDICATIONS  (PRN):  morphine  - Injectable 2 milliGRAM(s) IV Push every 4 hours PRN Severe Pain (7 - 10)  oxyCODONE    5 mG/acetaminophen 325 mG 1 Tablet(s) Oral every 4 hours PRN Moderate Pain (4 - 6)            Vital Signs Last 24 Hrs  T(C): 35.8 (19 Feb 2018 23:50), Max: 37.7 (19 Feb 2018 09:52)  T(F): 96.4 (19 Feb 2018 23:50), Max: 99.9 (19 Feb 2018 09:52)  HR: 85 (19 Feb 2018 23:50) (77 - 94)  BP: 81/80 (19 Feb 2018 23:50) (81/80 - 187/102)  BP(mean): --  RR: 18 (19 Feb 2018 23:50) (14 - 18)  SpO2: 95% (19 Feb 2018 18:28) (92% - 95%)          PHYSICAL EXAM:  · CONSTITUTIONAL:	Well-developed, well nourished, appropriate for age. no acute distress  · RESPIRATORY:   airway patent; breath sounds equal; good air movement; respirations non-labored; clear to auscultation bilaterally; no chest wall tenderness; no intercostal retractions; no rales,rhonchi or wheeze  · CARDIOVASCULAR	irregular rate and rhythm rapid  no rub  no murmur   · EXTREMITIES: No cyanosis, clubbing or edema  · VASCULAR: 	Equal and normal pulses (carotid, femoral, dorsalis pedis)  	  TELEMETRY: not applicable    ECG: Afib with RVR at 130 bpm with non specific ST changes    TTE: 6/2016 Left ventricle: Systolic function was normal. Ejection fraction was estimated   to be 55 %. There were no regional wall motion abnormalities. Wall thickness   was mildly increased. Hypertrophy was noted. Doppler parameters were   consistent with abnormal left ventricular relaxation (grade 1 diastolic   dysfunction).   Mitral valve: There was mild regurgitation.   Left atrium: The atrium was dilated.   Tricuspid valve: There was mild regurgitation.         LABS:                        6.9    6.37  )-----------( 277      ( 20 Feb 2018 00:55 )             21.4     02-19    141  |  109  |  10  ----------------------------<  134<H>  3.6   |  24  |  0.7    Ca    8.0<L>      19 Feb 2018 07:52  Phos  2.6     02-18  Mg     1.7     02-19    TPro  6.9  /  Alb  3.8  /  TBili  1.4<H>  /  DBili  x   /  AST  45<H>  /  ALT  22  /  AlkPhos  116<H>  02-18    CARDIAC MARKERS ( 18 Feb 2018 07:59 )  <0.02 ng/mL / x     / x     / x     / 1.5 ng/mL      PT/INR - ( 19 Feb 2018 07:52 )   PT: 12.10 sec;   INR: 1.12 ratio         PTT - ( 19 Feb 2018 07:52 )  PTT:29.6 sec    I&O's Summary    18 Feb 2018 07:01  -  19 Feb 2018 07:00  --------------------------------------------------------  IN: 550 mL / OUT: 125 mL / NET: 425 mL    19 Feb 2018 07:01  -  20 Feb 2018 03:39  --------------------------------------------------------  IN: 54 mL / OUT: 600 mL / NET: -546 mL      BNP  RADIOLOGY & ADDITIONAL STUDIES: SUBJ: Called by surgery resident for patient with rapid heart rate. Patient found to be in Afib with RVR. Patient offers no other complaints at this time other than some palpitations. Patient was previously seen on 2/18 for "STEMI". Patient went for exlap with Lysis of adhesions and was admitted to general surgical floor. Recommendations at that time were for admission to telemetry with a 2d echo and serial ekgs and cardiac enzymes.       MEDICATIONS  (STANDING):  diltiazem Injectable 20 milliGRAM(s) IV Push once  heparin  Injectable 5000 Unit(s) SubCutaneous every 8 hours  levETIRAcetam  IVPB 1000 milliGRAM(s) IV Intermittent every 12 hours  levothyroxine Injectable 25 MICROGram(s) IV Push at bedtime  metoprolol    tartrate Injectable 5 milliGRAM(s) IV Push every 6 hours  metoprolol    tartrate Injectable 5 milliGRAM(s) IV Push once  metoprolol    tartrate Injectable 5 milliGRAM(s) IV Push once  pantoprazole  Injectable 40 milliGRAM(s) IV Push daily  sodium chloride 0.9%. 1000 milliLiter(s) (100 mL/Hr) IV Continuous <Continuous>  traZODone 50 milliGRAM(s) Oral three times a day    MEDICATIONS  (PRN):  morphine  - Injectable 2 milliGRAM(s) IV Push every 4 hours PRN Severe Pain (7 - 10)  oxyCODONE    5 mG/acetaminophen 325 mG 1 Tablet(s) Oral every 4 hours PRN Moderate Pain (4 - 6)            Vital Signs Last 24 Hrs  T(C): 35.8 (19 Feb 2018 23:50), Max: 37.7 (19 Feb 2018 09:52)  T(F): 96.4 (19 Feb 2018 23:50), Max: 99.9 (19 Feb 2018 09:52)  HR: 85 (19 Feb 2018 23:50) (77 - 94)  BP: 81/80 (19 Feb 2018 23:50) (81/80 - 187/102)  BP(mean): --  RR: 18 (19 Feb 2018 23:50) (14 - 18)  SpO2: 95% (19 Feb 2018 18:28) (92% - 95%)          PHYSICAL EXAM:  · CONSTITUTIONAL:	Well-developed, well nourished, appropriate for age. no acute distress  · RESPIRATORY:   airway patent; breath sounds equal; good air movement; respirations non-labored; clear to auscultation bilaterally; no chest wall tenderness; no intercostal retractions; no rales,rhonchi or wheeze  · CARDIOVASCULAR	irregular rate and rhythm rapid  no rub  no murmur   · EXTREMITIES: No cyanosis, clubbing or edema  · VASCULAR: 	Equal and normal pulses (carotid, femoral, dorsalis pedis)  	  TELEMETRY: not applicable    ECG: Afib with RVR at 130 bpm with non specific ST changes    TTE: 6/2016 Left ventricle: Systolic function was normal. Ejection fraction was estimated   to be 55 %. There were no regional wall motion abnormalities. Wall thickness   was mildly increased. Hypertrophy was noted. Doppler parameters were   consistent with abnormal left ventricular relaxation (grade 1 diastolic   dysfunction).   Mitral valve: There was mild regurgitation.   Left atrium: The atrium was dilated.   Tricuspid valve: There was mild regurgitation.         LABS:                        6.9    6.37  )-----------( 277      ( 20 Feb 2018 00:55 )             21.4     02-19    141  |  109  |  10  ----------------------------<  134<H>  3.6   |  24  |  0.7    Ca    8.0<L>      19 Feb 2018 07:52  Phos  2.6     02-18  Mg     1.7     02-19    TPro  6.9  /  Alb  3.8  /  TBili  1.4<H>  /  DBili  x   /  AST  45<H>  /  ALT  22  /  AlkPhos  116<H>  02-18    CARDIAC MARKERS ( 18 Feb 2018 07:59 )  <0.02 ng/mL / x     / x     / x     / 1.5 ng/mL      PT/INR - ( 19 Feb 2018 07:52 )   PT: 12.10 sec;   INR: 1.12 ratio         PTT - ( 19 Feb 2018 07:52 )  PTT:29.6 sec    I&O's Summary    18 Feb 2018 07:01  -  19 Feb 2018 07:00  --------------------------------------------------------  IN: 550 mL / OUT: 125 mL / NET: 425 mL    19 Feb 2018 07:01  -  20 Feb 2018 03:39  --------------------------------------------------------  IN: 54 mL / OUT: 600 mL / NET: -546 mL      BNP  RADIOLOGY & ADDITIONAL STUDIES:          Attending:    Patient seen and examined. New events noted. D/w cardiology fellow.  JE roberson with RVR - new. High CHADS-VASC score.  Not anticoagulated.  Start AMiodarone  mg x1, then 1 mg/min x 6 hours, then 0.5 mg/min x 18 hours.  Once cleared by surgery, start anticoagulation with IV Heparin.  HR control with Cardizem at this time.

## 2018-02-20 NOTE — PROGRESS NOTE ADULT - ASSESSMENT
patient stable, continue to monitor H&H, possible advance diet in am. f/u pt/rehab and sw f/u pt/rehab and sw, f/u am h&H. continue to follow up HR now on cardizem drip Patient is distended, tender, persistent atrial fibrillation.   Afib: cont cardizem drip-maximum dose; add loppressor  	CTA chest to rule out PE  GI: replace NGT and perform CT abdomen with iso-osmolar PO contrast   ICU evaluation

## 2018-02-20 NOTE — PROGRESS NOTE ADULT - ASSESSMENT
Paroxysmal Afib with RVR  - admit to telemetry as was previously stated in consult dated 12/18/2018  - repeat cardiac enzymes from admission, only set done was from admission orders  - 2d echo  - CHADSVASC of 7- full dose anticoagulation should be resumed as soon as possible  - rate control with IV cardizem, give push now and can start a drip   - replete electrolytes to Mag >2 and K > 4    Acute blood loss anemia  - replete hemoglobin to goal of at least 8  - identify source of bleed Paroxysmal Afib with RVR  - admit to telemetry as was previously stated in consult dated 12/18/2018  - repeat cardiac enzymes from admission, only set done was from admission orders  - 2d echo to asses LV function and LA size  - CHADSVASC of 7- full dose anticoagulation should be resumed as soon as possible once acute blood loss anemia is adressed  - rate control with IV cardizem, give push now and can start a drip   - replete electrolytes to Mag >2 and K > 4    Acute blood loss anemia  - replete hemoglobin to goal of at least 8  - identify source of bleed Paroxysmal Afib with RVR  - admit to telemetry as was previously stated in consult dated 12/18/2018  - repeat cardiac enzymes from admission, only set done was from admission orders  - 2d echo to asses LV function and LA size  - CHADSVASC of 7- full dose anticoagulation should be resumed as soon as possible once acute blood loss anemia is adressed  - rate control with IV cardizem, give push now and can start a drip   - replete electrolytes to Mag >2 and K > 4  - obtain records from cardiologist in [place] regarding ?previous cardiac catherization    Acute blood loss anemia  - replete hemoglobin to goal of at least 8  - identify source of bleed

## 2018-02-20 NOTE — PROGRESS NOTE ADULT - SUBJECTIVE AND OBJECTIVE BOX
78y Female PAST MEDICAL & SURGICAL HISTORY:  Seizures  Dementia  High cholesterol  Hypothyroidism, unspecified type  Hypertension, unspecified type  Diabetes  Cerebrovascular accident (CVA), unspecified mechanism  Atrial fibrillation, unspecified type  H/O abdominal hysterectomy  S/P percutaneous endoscopic gastrostomy (PEG) tube placement: s/p removal of peg tube      Hospital Day: 3d  Post Operative Day: 2    Procedure: S/P EX LAP, [place] for sbo      Events of the Last 24h: patient had low H&H transfused 2 units. ng tube removed    Vital Signs Last 24 Hrs  T(C): 35.8 (2018 23:50), Max: 37.7 (2018 09:52)  T(F): 96.4 (2018 23:50), Max: 99.9 (2018 09:52)  HR: 85 (2018 23:50) (77 - 94)  BP: 81/80 (2018 23:50) (81/80 - 187/102)  BP(mean): --  RR: 18 (2018 23:50) (14 - 18)  SpO2: 95% (2018 18:28) (92% - 95%)      I&O's Detail    2018 07:01  -  2018 07:00  --------------------------------------------------------  IN:    sodium chloride 0.9%: 100 mL    sodium chloride 0.9%.: 450 mL  Total IN: 550 mL    OUT:    Indwelling Catheter - Urethral: 125 mL  Total OUT: 125 mL    Total NET: 425 mL      2018 07:01  -  2018 01:50  --------------------------------------------------------  IN:    heparin  Infusion.: 54 mL  Total IN: 54 mL    OUT:    Indwelling Catheter - Urethral: 600 mL  Total OUT: 600 mL    Total NET: -546 mL          PHYSICAL EXAM:    GENERAL: NAD    HEENT: NCAT    CHEST/LUNGS: CTAB    HEART: RRR    ABDOMEN: Soft, tender around incision    EXTREMITIES:  FROM    NEURO: No focal neurological deficits    SKIN: No rashes or lesions    INCISION/WOUNDS: CDI    Diet, NPO:   NPO for Procedure/Test     NPO Start Date: 2018,   NPO Start Time: 00:59  Except Medications (18 @ 23:22)    MEDICATIONS  (STANDING):  heparin  Injectable 5000 Unit(s) SubCutaneous every 8 hours  levETIRAcetam  IVPB 1000 milliGRAM(s) IV Intermittent every 12 hours  levothyroxine Injectable 25 MICROGram(s) IV Push at bedtime  metoprolol    tartrate Injectable 5 milliGRAM(s) IV Push every 6 hours  pantoprazole  Injectable 40 milliGRAM(s) IV Push daily  sodium chloride 0.9%. 1000 milliLiter(s) (100 mL/Hr) IV Continuous <Continuous>  traZODone 50 milliGRAM(s) Oral three times a day    MEDICATIONS  (PRN):  morphine  - Injectable 2 milliGRAM(s) IV Push every 4 hours PRN Severe Pain (7 - 10)  oxyCODONE    5 mG/acetaminophen 325 mG 1 Tablet(s) Oral every 4 hours PRN Moderate Pain (4 - 6)                              6.2    8.67  )-----------( 368      ( 2018 13:42 )             19.4                       141   |  109   |  10                 Ca: 8.0    BMP:   ----------------------------< 134    M.7   (18 @ 07:52)             3.6    |  24    | 0.7                Ph: x        LFT:     TPro: 6.9 / Alb: 3.8 / TBili: 1.4 / DBili: x / AST: 45 / ALT: 22 / AlkPhos: 116   (18 @ 07:59)    LFTs:             6.9  | 1.4  | 45       ------------------[116     ( 2018 07:59 )  3.8  | x    | 22          Lipase:13     Amylase:x        Coags:     12.10  ----< 1.12    ( 2018 07:52 )     29.6        CARDIAC MARKERS ( 2018 07:59 )  <0.02 ng/mL / x     / x     / x     / 1.5 ng/mL      SPECTRA: 8281 78y Female PAST MEDICAL & SURGICAL HISTORY:  Seizures  Dementia  High cholesterol  Hypothyroidism, unspecified type  Hypertension, unspecified type  Diabetes  Cerebrovascular accident (CVA), unspecified mechanism  Atrial fibrillation, unspecified type  H/O abdominal hysterectomy  S/P percutaneous endoscopic gastrostomy (PEG) tube placement: s/p removal of peg tube      Hospital Day: 2d  Post Operative Day:    Procedure:    Events of the Last 24h: ng removed transfused 2 units, kevan went itno afib, give metoprolol pushes x2 with no response, enzymes sent ekg showed afib with rvr, cardiology consulted, given 20 of cardizem then started on cardizem drip upgraded to CCU    Vital Signs Last 24 Hrs  T(C): 35.8 (2018 23:50), Max: 37.7 (2018 09:52)  T(F): 96.4 (2018 23:50), Max: 99.9 (2018 09:52)  HR: 85 (2018 23:50) (77 - 94)  BP: 81/80 (2018 23:50) (81/80 - 187/102)  BP(mean): --  RR: 18 (2018 23:50) (14 - 18)  SpO2: 95% (2018 18:28) (92% - 95%)      I&O's Detail    2018 07:  -  2018 07:00  --------------------------------------------------------  IN:    sodium chloride 0.9%: 100 mL    sodium chloride 0.9%.: 450 mL  Total IN: 550 mL    OUT:    Indwelling Catheter - Urethral: 125 mL  Total OUT: 125 mL    Total NET: 425 mL      2018 07:01  -  2018 04:37  --------------------------------------------------------  IN:    heparin  Infusion.: 54 mL  Total IN: 54 mL    OUT:    Indwelling Catheter - Urethral: 600 mL  Total OUT: 600 mL    Total NET: -546 mL          PHYSICAL EXAM:    GENERAL: NAD    HEENT: NCAT    CHEST/LUNGS: CTAB    HEART: RRR,  No murmurs, rubs, or gallops    ABDOMEN: SNTND +BS    EXTREMITIES:  FROM, No clubbing, cyanosis, or edema, palpable pulse    NEURO: No focal neurological deficits    SKIN: No rashes or lesions    INCISION/WOUNDS:    Diet, NPO:   NPO for Procedure/Test     NPO Start Date: 2018,   NPO Start Time: 00:59  Except Medications (18 @ 23:22)    MEDICATIONS  (STANDING):  diltiazem Infusion 5 mG/Hr (5 mL/Hr) IV Continuous <Continuous>  heparin  Injectable 5000 Unit(s) SubCutaneous every 8 hours  levETIRAcetam  IVPB 1000 milliGRAM(s) IV Intermittent every 12 hours  levothyroxine Injectable 25 MICROGram(s) IV Push at bedtime  magnesium sulfate  IVPB 2 Gram(s) IV Intermittent once  metoprolol    tartrate Injectable 5 milliGRAM(s) IV Push every 6 hours  pantoprazole  Injectable 40 milliGRAM(s) IV Push daily  sodium chloride 0.9%. 1000 milliLiter(s) (100 mL/Hr) IV Continuous <Continuous>  traZODone 50 milliGRAM(s) Oral three times a day    MEDICATIONS  (PRN):  morphine  - Injectable 2 milliGRAM(s) IV Push every 4 hours PRN Severe Pain (7 - 10)  oxyCODONE    5 mG/acetaminophen 325 mG 1 Tablet(s) Oral every 4 hours PRN Moderate Pain (4 - 6)                              6.9    6.37  )-----------( 277      ( 2018 00:55 )             21.4                       x     |  x     |  x                  Ca: x      BMP:   ----------------------------< x      M.7   (18 @ 03:09)             x      |  x     | x                  Ph: 2.7      LFT:     TPro: 6.9 / Alb: 3.8 / TBili: 1.4 / DBili: x / AST: 45 / ALT: 22 / AlkPhos: 116   (18 @ 07:59)    LFTs:             6.9  | 1.4  | 45       ------------------[116     ( 2018 07:59 )  3.8  | x    | 22          Lipase:13     Amylase:x        Coags:     12.10  ----< 1.12    ( 2018 07:52 )     29.6        CARDIAC MARKERS ( 2018 02:54 )  0.03 ng/mL / x     / 485 U/L / x     / 3.4 ng/mL  CARDIAC MARKERS ( 2018 07:59 )  <0.02 ng/mL / x     / x     / x     / 1.5 ng/mL      SPECTRA: [id number]

## 2018-02-20 NOTE — CONSULT NOTE ADULT - SUBJECTIVE AND OBJECTIVE BOX
HPI:  77yo F presents with 1 days h/o abdominal pain and vomiting. No bowel function. H/o hysterectomy and PEG placement s/p stroke in 2016. Currently on ASA/Plavix/Coumadin. No chest pain, shortness of breath. Bloody diarrhea 2 days ago. afebrile. (18 Feb 2018 18:45)  sp exp lap lysis of adhesions /sbo    T(C): 37.1 (02-20-18 @ 12:00), Max: 37.6 (02-19-18 @ 17:15)  HR: 118 (02-20-18 @ 14:25) (83 - 135)  BP: 141/76 (02-20-18 @ 14:25) (81/80 - 170/77)  RR: 21 (02-20-18 @ 14:25) (14 - 33)  SpO2: 98% (02-20-18 @ 14:25) (93% - 98%)    MOTOR EXAM alert conversant  dense l sarah lue contracrted 1/5 ms lle    dependent transfers not ambulatory    Physical Medicine And Rehabilitation Services are not indicated in this patient for the following Reason(s):    [    ] Patient is medically unstable      [    ]  Patient does not have appropriate activity orders      [     ] Patient has no weight bearing status for:      [     ] Patient is independently ambulating      [     ]  Patient is from Skilled Nursing Facility and is appropriate to return      [ x    ] Patient was non-ambulatory prior to admission DENSE L SARAH NONAMBULATORY x 4 YRS HAS 24x 7 HHA at HOME      [     ]  Other    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

## 2018-02-21 LAB
ANION GAP SERPL CALC-SCNC: 10 MMOL/L — SIGNIFICANT CHANGE UP (ref 7–14)
APTT BLD: 22.7 SEC — CRITICAL LOW (ref 27–39.2)
BUN SERPL-MCNC: 13 MG/DL — SIGNIFICANT CHANGE UP (ref 10–20)
CALCIUM SERPL-MCNC: 7.9 MG/DL — LOW (ref 8.5–10.1)
CHLORIDE SERPL-SCNC: 112 MMOL/L — HIGH (ref 98–110)
CK MB BLD-MCNC: 1 % — SIGNIFICANT CHANGE UP (ref 0–4)
CK MB BLD-MCNC: 1 % — SIGNIFICANT CHANGE UP (ref 0–4)
CK MB CFR SERPL CALC: 4.6 NG/ML — SIGNIFICANT CHANGE UP (ref 0.6–6.3)
CK MB CFR SERPL CALC: 5.2 NG/ML — SIGNIFICANT CHANGE UP (ref 0.6–6.3)
CK SERPL-CCNC: 421 U/L — HIGH (ref 0–225)
CK SERPL-CCNC: 435 U/L — HIGH (ref 0–225)
CO2 SERPL-SCNC: 19 MMOL/L — SIGNIFICANT CHANGE UP (ref 17–32)
CREAT SERPL-MCNC: 0.9 MG/DL — SIGNIFICANT CHANGE UP (ref 0.7–1.5)
GLUCOSE SERPL-MCNC: 128 MG/DL — HIGH (ref 70–110)
HCT VFR BLD CALC: 22.9 % — LOW (ref 37–47)
HCT VFR BLD CALC: 24.1 % — LOW (ref 37–47)
HGB BLD-MCNC: 7.5 G/DL — LOW (ref 14–18)
HGB BLD-MCNC: 7.8 G/DL — LOW (ref 14–18)
INR BLD: 0.86 RATIO — SIGNIFICANT CHANGE UP (ref 0.65–1.3)
MAGNESIUM SERPL-MCNC: 2.9 MG/DL — HIGH (ref 1.8–2.4)
MCHC RBC-ENTMCNC: 26.4 PG — LOW (ref 27–31)
MCHC RBC-ENTMCNC: 26.5 PG — LOW (ref 27–31)
MCHC RBC-ENTMCNC: 32.4 G/DL — SIGNIFICANT CHANGE UP (ref 32–37)
MCHC RBC-ENTMCNC: 32.8 G/DL — SIGNIFICANT CHANGE UP (ref 32–37)
MCV RBC AUTO: 80.9 FL — LOW (ref 81–91)
MCV RBC AUTO: 81.7 FL — SIGNIFICANT CHANGE UP (ref 81–91)
NRBC # BLD: 0 /100 WBCS — SIGNIFICANT CHANGE UP (ref 0–0)
NRBC # BLD: 0 /100 WBCS — SIGNIFICANT CHANGE UP (ref 0–0)
PHOSPHATE SERPL-MCNC: 1.6 MG/DL — LOW (ref 2.1–4.9)
PLATELET # BLD AUTO: 267 K/UL — SIGNIFICANT CHANGE UP (ref 130–400)
PLATELET # BLD AUTO: 274 K/UL — SIGNIFICANT CHANGE UP (ref 130–400)
POTASSIUM SERPL-MCNC: 3.9 MMOL/L — SIGNIFICANT CHANGE UP (ref 3.5–5)
POTASSIUM SERPL-SCNC: 3.9 MMOL/L — SIGNIFICANT CHANGE UP (ref 3.5–5)
PROTHROM AB SERPL-ACNC: 9.3 SEC — LOW (ref 9.95–12.87)
RBC # BLD: 2.83 M/UL — LOW (ref 4.2–5.4)
RBC # BLD: 2.95 M/UL — LOW (ref 4.2–5.4)
RBC # FLD: 17.4 % — HIGH (ref 11.5–14.5)
RBC # FLD: 17.5 % — HIGH (ref 11.5–14.5)
SODIUM SERPL-SCNC: 141 MMOL/L — SIGNIFICANT CHANGE UP (ref 135–146)
TROPONIN I SERPL-MCNC: 0.06 NG/ML — HIGH (ref 0–0.05)
TROPONIN I SERPL-MCNC: 0.11 NG/ML — HIGH (ref 0–0.05)
WBC # BLD: 4.22 K/UL — LOW (ref 4.8–10.8)
WBC # BLD: 4.85 K/UL — SIGNIFICANT CHANGE UP (ref 4.8–10.8)
WBC # FLD AUTO: 4.22 K/UL — LOW (ref 4.8–10.8)
WBC # FLD AUTO: 4.85 K/UL — SIGNIFICANT CHANGE UP (ref 4.8–10.8)

## 2018-02-21 RX ORDER — POTASSIUM PHOSPHATE, MONOBASIC POTASSIUM PHOSPHATE, DIBASIC 236; 224 MG/ML; MG/ML
15 INJECTION, SOLUTION INTRAVENOUS ONCE
Qty: 0 | Refills: 0 | Status: COMPLETED | OUTPATIENT
Start: 2018-02-21 | End: 2018-02-21

## 2018-02-21 RX ORDER — LEVETIRACETAM 250 MG/1
1000 TABLET, FILM COATED ORAL
Qty: 0 | Refills: 0 | Status: DISCONTINUED | OUTPATIENT
Start: 2018-02-21 | End: 2018-02-26

## 2018-02-21 RX ORDER — METOPROLOL TARTRATE 50 MG
25 TABLET ORAL
Qty: 0 | Refills: 0 | Status: DISCONTINUED | OUTPATIENT
Start: 2018-02-21 | End: 2018-02-21

## 2018-02-21 RX ORDER — ACETAMINOPHEN 500 MG
650 TABLET ORAL EVERY 6 HOURS
Qty: 0 | Refills: 0 | Status: DISCONTINUED | OUTPATIENT
Start: 2018-02-21 | End: 2018-02-26

## 2018-02-21 RX ORDER — METOPROLOL TARTRATE 50 MG
50 TABLET ORAL
Qty: 0 | Refills: 0 | Status: DISCONTINUED | OUTPATIENT
Start: 2018-02-21 | End: 2018-02-23

## 2018-02-21 RX ORDER — METOPROLOL TARTRATE 50 MG
25 TABLET ORAL ONCE
Qty: 0 | Refills: 0 | Status: COMPLETED | OUTPATIENT
Start: 2018-02-21 | End: 2018-02-21

## 2018-02-21 RX ORDER — LEVOTHYROXINE SODIUM 125 MCG
50 TABLET ORAL DAILY
Qty: 0 | Refills: 0 | Status: DISCONTINUED | OUTPATIENT
Start: 2018-02-21 | End: 2018-02-26

## 2018-02-21 RX ORDER — TRAZODONE HCL 50 MG
50 TABLET ORAL THREE TIMES A DAY
Qty: 0 | Refills: 0 | Status: DISCONTINUED | OUTPATIENT
Start: 2018-02-21 | End: 2018-02-21

## 2018-02-21 RX ORDER — ASPIRIN/CALCIUM CARB/MAGNESIUM 324 MG
81 TABLET ORAL DAILY
Qty: 0 | Refills: 0 | Status: DISCONTINUED | OUTPATIENT
Start: 2018-02-21 | End: 2018-02-26

## 2018-02-21 RX ORDER — ATORVASTATIN CALCIUM 80 MG/1
20 TABLET, FILM COATED ORAL AT BEDTIME
Qty: 0 | Refills: 0 | Status: DISCONTINUED | OUTPATIENT
Start: 2018-02-21 | End: 2018-02-26

## 2018-02-21 RX ORDER — SERTRALINE 25 MG/1
100 TABLET, FILM COATED ORAL DAILY
Qty: 0 | Refills: 0 | Status: DISCONTINUED | OUTPATIENT
Start: 2018-02-21 | End: 2018-02-26

## 2018-02-21 RX ORDER — MEMANTINE HYDROCHLORIDE 10 MG/1
5 TABLET ORAL
Qty: 0 | Refills: 0 | Status: DISCONTINUED | OUTPATIENT
Start: 2018-02-21 | End: 2018-02-26

## 2018-02-21 RX ORDER — PANTOPRAZOLE SODIUM 20 MG/1
40 TABLET, DELAYED RELEASE ORAL
Qty: 0 | Refills: 0 | Status: DISCONTINUED | OUTPATIENT
Start: 2018-02-21 | End: 2018-02-26

## 2018-02-21 RX ORDER — VALSARTAN 80 MG/1
160 TABLET ORAL DAILY
Qty: 0 | Refills: 0 | Status: DISCONTINUED | OUTPATIENT
Start: 2018-02-21 | End: 2018-02-26

## 2018-02-21 RX ORDER — TRAZODONE HCL 50 MG
50 TABLET ORAL AT BEDTIME
Qty: 0 | Refills: 0 | Status: DISCONTINUED | OUTPATIENT
Start: 2018-02-21 | End: 2018-02-26

## 2018-02-21 RX ADMIN — Medication 25 MILLIGRAM(S): at 12:26

## 2018-02-21 RX ADMIN — LEVETIRACETAM 1000 MILLIGRAM(S): 250 TABLET, FILM COATED ORAL at 18:15

## 2018-02-21 RX ADMIN — Medication 50 MICROGRAM(S): at 12:26

## 2018-02-21 RX ADMIN — MEMANTINE HYDROCHLORIDE 5 MILLIGRAM(S): 10 TABLET ORAL at 18:15

## 2018-02-21 RX ADMIN — Medication 650 MILLIGRAM(S): at 19:27

## 2018-02-21 RX ADMIN — HEPARIN SODIUM 5000 UNIT(S): 5000 INJECTION INTRAVENOUS; SUBCUTANEOUS at 05:57

## 2018-02-21 RX ADMIN — PANTOPRAZOLE SODIUM 40 MILLIGRAM(S): 20 TABLET, DELAYED RELEASE ORAL at 12:26

## 2018-02-21 RX ADMIN — SERTRALINE 100 MILLIGRAM(S): 25 TABLET, FILM COATED ORAL at 12:25

## 2018-02-21 RX ADMIN — Medication 650 MILLIGRAM(S): at 14:31

## 2018-02-21 RX ADMIN — POTASSIUM PHOSPHATE, MONOBASIC POTASSIUM PHOSPHATE, DIBASIC 62.5 MILLIMOLE(S): 236; 224 INJECTION, SOLUTION INTRAVENOUS at 16:04

## 2018-02-21 RX ADMIN — Medication 25 MILLIGRAM(S): at 18:14

## 2018-02-21 RX ADMIN — LEVETIRACETAM 400 MILLIGRAM(S): 250 TABLET, FILM COATED ORAL at 05:57

## 2018-02-21 RX ADMIN — VALSARTAN 160 MILLIGRAM(S): 80 TABLET ORAL at 12:26

## 2018-02-21 RX ADMIN — Medication 5 MILLIGRAM(S): at 05:57

## 2018-02-21 RX ADMIN — Medication 81 MILLIGRAM(S): at 12:26

## 2018-02-21 RX ADMIN — HEPARIN SODIUM 5000 UNIT(S): 5000 INJECTION INTRAVENOUS; SUBCUTANEOUS at 15:50

## 2018-02-21 RX ADMIN — Medication 650 MILLIGRAM(S): at 12:26

## 2018-02-21 RX ADMIN — Medication 650 MILLIGRAM(S): at 18:14

## 2018-02-21 NOTE — PROGRESS NOTE ADULT - SUBJECTIVE AND OBJECTIVE BOX
Patient is a 78y old  Female who presents with a chief complaint of Abdominal pain and vomiting (18 Feb 2018 18:45)    HPI:  77yo F presents with 1 days h/o abdominal pain and vomiting. No bowel function. H/o hysterectomy and PEG placement s/p stroke in 2016. Currently on ASA/Plavix/Coumadin. No chest pain, shortness of breath. Bloody diarrhea 2 days ago. afebrile. (18 Feb 2018 18:45)      SUBJ:  Patient seen and examined.      MEDICATIONS  (STANDING):  diltiazem Infusion 15 mG/Hr (15 mL/Hr) IV Continuous <Continuous>  heparin  Injectable 5000 Unit(s) SubCutaneous every 8 hours  levETIRAcetam  IVPB 1000 milliGRAM(s) IV Intermittent every 12 hours  levothyroxine Injectable 25 MICROGram(s) IV Push at bedtime  metoprolol    tartrate Injectable 5 milliGRAM(s) IV Push every 6 hours  pantoprazole  Injectable 40 milliGRAM(s) IV Push daily  potassium phosphate IVPB 15 milliMole(s) IV Intermittent once  sodium chloride 0.9%. 1000 milliLiter(s) (100 mL/Hr) IV Continuous <Continuous>    MEDICATIONS  (PRN):  morphine  - Injectable 2 milliGRAM(s) IV Push every 4 hours PRN Severe Pain (7 - 10)            Vital Signs Last 24 Hrs  T(C): 36.2 (21 Feb 2018 04:00), Max: 37.1 (20 Feb 2018 12:00)  T(F): 97.1 (21 Feb 2018 04:00), Max: 98.7 (20 Feb 2018 12:00)  HR: 90 (21 Feb 2018 07:30) (86 - 131)  BP: 127/59 (21 Feb 2018 07:30) (111/56 - 162/77)  BP(mean): 80 (21 Feb 2018 07:30) (72 - 126)  RR: 15 (21 Feb 2018 07:30) (15 - 33)  SpO2: 100% (21 Feb 2018 07:30) (93% - 100%)      PHYSICAL EXAM:    GEN: awake, responds to verbal stimuli  HEENT: NC/AT  Neck: No JVD  CV: Irreg, S1-S2,  Lungs: clear anteriorly  Abd: Soft  Ext: No edema      I&O's Summary    20 Feb 2018 07:01  -  21 Feb 2018 07:00  --------------------------------------------------------  IN: 2145 mL / OUT: 110 mL / NET: 2035 mL    	    TELEMETRY: AF        TTE:< from: Transthoracic Echocardiogram (02.20.18 @ 08:27) >  Summary:   1. Left ventricular ejection fraction, by visual estimation, is 60 to   65%.   2. Normal left ventricular size and wall thicknesses, with normal   systolic and diastolic function.   3. Mild aortic regurgitation.   4. Estimated pulmonary artery systolic pressure is 45.1 mmHg assuming a   right atrial pressure of 8 mmHg, which is consistent with mild pulmonary   hypertension.   5.Pulmonary hypertension is present.   6. LA volume Index is 34.2 ml/m² ml/m2.    < end of copied text >      LABS:                        7.8    4.85  )-----------( 274      ( 21 Feb 2018 04:44 )             24.1     02-20    141  |  112<H>  |  13  ----------------------------<  128<H>  3.9   |  19  |  0.9    Ca    7.9<L>      20 Feb 2018 23:40  Phos  1.6     02-20  Mg     2.9     02-20      CARDIAC MARKERS ( 21 Feb 2018 04:44 )  0.06 ng/mL / x     / 421 U/L / x     / 4.6 ng/mL  CARDIAC MARKERS ( 20 Feb 2018 23:40 )  0.11 ng/mL / x     / 435 U/L / x     / 5.2 ng/mL  CARDIAC MARKERS ( 20 Feb 2018 11:05 )  0.17 ng/mL / x     / 482 U/L / x     / 5.1 ng/mL  CARDIAC MARKERS ( 20 Feb 2018 02:54 )  0.03 ng/mL / x     / 485 U/L / x     / 3.4 ng/mL      PT/INR - ( 20 Feb 2018 23:40 )   PT: 9.30 sec;   INR: 0.86 ratio         PTT - ( 20 Feb 2018 23:40 )  PTT:22.7 sec      BNP  RADIOLOGY & ADDITIONAL STUDIES:      IMPRESSION AND PLAN:

## 2018-02-21 NOTE — PROGRESS NOTE ADULT - ASSESSMENT
Paroxysmal Afib with RVR  preserved EF by echo  - c/w rate control  - CHADSVASC of 7- resume Coumadin, once the bleeding resolved, and cleared from surgical standpoint  - monitor electrolytes, anemia  -management of the hematoma as per primary team.

## 2018-02-21 NOTE — PROGRESS NOTE ADULT - SUBJECTIVE AND OBJECTIVE BOX
IRA PACKER  [id number]  78y Female    Indication for ICU admission: Went into Afib with RVR post op, hemodynamic monitoring   Admit Date: 2/18/18  ICU Date: 2/20/18  OR Date: 2/18/18    No Known Allergies    PAST MEDICAL & SURGICAL HISTORY:  Seizures  Dementia  High cholesterol  Hypothyroidism, unspecified type  Hypertension, unspecified type  Diabetes  Cerebrovascular accident (CVA), unspecified mechanism  Atrial fibrillation, unspecified type  H/O abdominal hysterectomy  S/P percutaneous endoscopic gastrostomy (PEG) tube placement: s/p removal of peg tube    Home Medications:  aspirin 81 mg oral tablet, chewable:  (18 Feb 2018 08:46)  Diovan 160 mg oral tablet: 1 tab(s) orally once a day (18 Feb 2018 08:46)  Januvia:  (18 Feb 2018 08:46)  levETIRAcetam 1000 mg oral tablet: 1 tab(s) orally 2 times a day (18 Feb 2018 08:46)  levothyroxine 50 mcg (0.05 mg) oral tablet: 1 tab(s) orally once a day (18 Feb 2018 08:46)  Lipitor:  (18 Feb 2018 08:46)  metoprolol tartrate 50 mg oral tablet: 1 tab(s) orally 2 times a day (18 Feb 2018 08:46)  mirtazapine:  (18 Feb 2018 08:46)  Namenda:  (18 Feb 2018 08:46)  omeprazole 20 mg oral delayed release tablet: 1 tab(s) orally once a day (18 Feb 2018 08:46)  Pepcid 20 mg oral tablet: 1 tab(s) orally 2 times a day (18 Feb 2018 08:46)  Plavix: 75 milligram(s) orally (18 Feb 2018 08:46)  Toviaz 8 mg oral tablet, extended release: 1 tab(s) orally once a day (18 Feb 2018 08:46)  traZODone 50 mg oral tablet: 1 tab(s) orally 3 times a day (18 Feb 2018 08:46)  Zoloft 100 mg oral tablet: 1 tab(s) orally once a day (18 Feb 2018 08:46)        24HRS EVENT:  POD#3 s/p SBO,  Ex-lap, lysed band and bowel pinked up, no RSX done  Post-op, Hg dropped to 6.9, was transfused 2PRBC, Hg is now 8.3, next day went into Afib with RVR, started cardizem gtt after a few Metoprolol pushes. CT chest was neg for PE. CT Abd/pelvis showed hemorraghic collection 7.4x 4.8 cm. Trop was .17>.11, f/u am CE. On exam- abd appears mildly distended, tender with BS x 4 quadrants.         DVT PTX: Heparin SQ    GI PTX: Protonix 40mg daily    ***Tubes/Lines/Drains  ***  Peripheral IV      REVIEW OF SYSTEMS    [x ] A ten-point review of systems was otherwise negative except as noted.  [ ] Due to altered mental status/intubation, subjective information were not able to be obtained from the patient. History was obtained, to the extent possible, from review of the chart and collateral sources of information.

## 2018-02-21 NOTE — PROGRESS NOTE ADULT - ASSESSMENT
Assessment and Plan:    Neurologic: AAO x 3. Motor 5/5. Sensory intact.   SEDATION: []yes [x]no  A/P:    Respiratory: CTA B/L No wheeze, rales, rhochi.  Intubated: []yes [x]no  A/P:    Cardiovascular: RRR. No murmurs, rubs, gallops.  A/P:    Gastrointestinal/Nutrition: Soft NTND; bowel sounds equal. NG in place.   A/P:    Renal/Genitourinary: Luna in place: []yes [x]no  A/P:    Hematologic: HB stable  A/P:    Infectious Disease:   A/P:    Endocrine:   A/P:    MSK: 2+ peripheral pulses equal B/L. No clubbing, cyanosis, edema. No rashes or lesions.  A/P:    Lines/Tubes:    [x]peripheral IV    Disposition: Assessment and Plan:    Neurologic: AAO x 3. Motor 5/5. Sensory intact. left upper extremity paralysis, lle weakness  SEDATION: []yes [x]no  A/P:    Respiratory: CTA B/L No wheeze, rales, rhochi.  Intubated: []yes [x]no  A/P:    Cardiovascular: RRR. No murmurs, rubs, gallops.  A/P:    Gastrointestinal/Nutrition: Soft NTND; bowel sounds equal. NG in place. Midline incision is open to air, staples are intact.surrounding area is firm  A/P: c    Renal/Genitourinary: Luna in place: []yes [x]no  A/P:    Hematologic: HB stable  A/P:    Infectious Disease:   A/P:    Endocrine:   A/P:    MSK: 2+ peripheral pulses equal B/L. No clubbing, cyanosis, edema. No rashes or lesions.  A/P:    Lines/Tubes:    [x]peripheral IV    Disposition: Assessment and Plan:    Neurologic: AAO x 3. Motor 5/5. Sensory intact. left upper extremity paralysis, lle weakness  SEDATION: []yes [x]no  A/P: pain --> tylenol atc, d/c morphine  CVA with residual deficits    Respiratory: CTA B/L No wheeze, rales, rhochi.  Intubated: []yes [x]no  A/P: respiratory insuficiency --> chest pt, IS, mobilization,     Cardiovascular: RRR. No murmurs, rubs, gallops.  A/P: afib w/ rvr on cardizem -> start PO home meds    Gastrointestinal/Nutrition: Soft NTND; bowel sounds equal. NG in place. Midline incision is open to air, staples are intact.surrounding area is firm  A/P: d/c ngt --> NPO except meds, wound care per primary surgeon    Renal/Genitourinary: Luna in place: []yes [x]no  A/P: hypokalemia, repleted with repeat 3.9  urine collection pouch, incontinent    Hematologic: HB stable  A/P: 7.8 <--7.5<-- 8.3 stable, no need for further transfusions    Infectious Disease:   A/P: no diagnoses    Endocrine:   A/P: hypothyroid, on synthroid    MSK: 2+ peripheral pulses equal B/L. No clubbing, cyanosis, edema. No rashes or lesions.  A/P:  holding anticiag for bleeding, may consider restarting in next 24h given high risk    Lines/Tubes:    [x]peripheral IV    Disposition: consider transfer to tele today

## 2018-02-22 LAB
ANION GAP SERPL CALC-SCNC: 9 MMOL/L — SIGNIFICANT CHANGE UP (ref 7–14)
APTT BLD: 22.2 SEC — CRITICAL LOW (ref 27–39.2)
BUN SERPL-MCNC: 11 MG/DL — SIGNIFICANT CHANGE UP (ref 10–20)
CALCIUM SERPL-MCNC: 7.9 MG/DL — LOW (ref 8.5–10.1)
CHLORIDE SERPL-SCNC: 113 MMOL/L — HIGH (ref 98–110)
CO2 SERPL-SCNC: 19 MMOL/L — SIGNIFICANT CHANGE UP (ref 17–32)
CREAT SERPL-MCNC: 0.6 MG/DL — LOW (ref 0.7–1.5)
GLUCOSE SERPL-MCNC: 110 MG/DL — SIGNIFICANT CHANGE UP (ref 70–110)
HCT VFR BLD CALC: 25.1 % — LOW (ref 37–47)
HGB BLD-MCNC: 8.1 G/DL — LOW (ref 14–18)
INR BLD: 0.92 RATIO — SIGNIFICANT CHANGE UP (ref 0.65–1.3)
MAGNESIUM SERPL-MCNC: 2.1 MG/DL — SIGNIFICANT CHANGE UP (ref 1.8–2.4)
MCHC RBC-ENTMCNC: 26.7 PG — LOW (ref 27–31)
MCHC RBC-ENTMCNC: 32.3 G/DL — SIGNIFICANT CHANGE UP (ref 32–37)
MCV RBC AUTO: 82.8 FL — SIGNIFICANT CHANGE UP (ref 81–91)
NRBC # BLD: 1 /100 WBCS — HIGH (ref 0–0)
PHOSPHATE SERPL-MCNC: 2.7 MG/DL — SIGNIFICANT CHANGE UP (ref 2.1–4.9)
PLATELET # BLD AUTO: 340 K/UL — SIGNIFICANT CHANGE UP (ref 130–400)
POTASSIUM SERPL-MCNC: 4.2 MMOL/L — SIGNIFICANT CHANGE UP (ref 3.5–5)
POTASSIUM SERPL-SCNC: 4.2 MMOL/L — SIGNIFICANT CHANGE UP (ref 3.5–5)
PROTHROM AB SERPL-ACNC: 9.9 SEC — LOW (ref 9.95–12.87)
RBC # BLD: 3.03 M/UL — LOW (ref 4.2–5.4)
RBC # FLD: 18.5 % — HIGH (ref 11.5–14.5)
SODIUM SERPL-SCNC: 141 MMOL/L — SIGNIFICANT CHANGE UP (ref 135–146)
WBC # BLD: 5.1 K/UL — SIGNIFICANT CHANGE UP (ref 4.8–10.8)
WBC # FLD AUTO: 5.1 K/UL — SIGNIFICANT CHANGE UP (ref 4.8–10.8)

## 2018-02-22 RX ORDER — ONDANSETRON 8 MG/1
4 TABLET, FILM COATED ORAL ONCE
Qty: 0 | Refills: 0 | Status: COMPLETED | OUTPATIENT
Start: 2018-02-22 | End: 2018-02-22

## 2018-02-22 RX ADMIN — Medication 50 MILLIGRAM(S): at 21:11

## 2018-02-22 RX ADMIN — Medication 50 MILLIGRAM(S): at 05:55

## 2018-02-22 RX ADMIN — Medication 650 MILLIGRAM(S): at 18:14

## 2018-02-22 RX ADMIN — Medication 650 MILLIGRAM(S): at 05:52

## 2018-02-22 RX ADMIN — ATORVASTATIN CALCIUM 20 MILLIGRAM(S): 80 TABLET, FILM COATED ORAL at 21:11

## 2018-02-22 RX ADMIN — Medication 15 MG/HR: at 09:52

## 2018-02-22 RX ADMIN — VALSARTAN 160 MILLIGRAM(S): 80 TABLET ORAL at 05:52

## 2018-02-22 RX ADMIN — Medication 50 MILLIGRAM(S): at 00:22

## 2018-02-22 RX ADMIN — HEPARIN SODIUM 5000 UNIT(S): 5000 INJECTION INTRAVENOUS; SUBCUTANEOUS at 05:54

## 2018-02-22 RX ADMIN — Medication 650 MILLIGRAM(S): at 00:19

## 2018-02-22 RX ADMIN — ONDANSETRON 4 MILLIGRAM(S): 8 TABLET, FILM COATED ORAL at 15:06

## 2018-02-22 RX ADMIN — MEMANTINE HYDROCHLORIDE 5 MILLIGRAM(S): 10 TABLET ORAL at 18:12

## 2018-02-22 RX ADMIN — Medication 50 MILLIGRAM(S): at 18:11

## 2018-02-22 RX ADMIN — Medication 15 MG/HR: at 17:04

## 2018-02-22 RX ADMIN — Medication 650 MILLIGRAM(S): at 18:48

## 2018-02-22 RX ADMIN — MEMANTINE HYDROCHLORIDE 5 MILLIGRAM(S): 10 TABLET ORAL at 05:55

## 2018-02-22 RX ADMIN — Medication 650 MILLIGRAM(S): at 23:28

## 2018-02-22 RX ADMIN — HEPARIN SODIUM 5000 UNIT(S): 5000 INJECTION INTRAVENOUS; SUBCUTANEOUS at 21:11

## 2018-02-22 RX ADMIN — Medication 81 MILLIGRAM(S): at 11:00

## 2018-02-22 RX ADMIN — SERTRALINE 100 MILLIGRAM(S): 25 TABLET, FILM COATED ORAL at 11:00

## 2018-02-22 RX ADMIN — HEPARIN SODIUM 5000 UNIT(S): 5000 INJECTION INTRAVENOUS; SUBCUTANEOUS at 14:09

## 2018-02-22 RX ADMIN — LEVETIRACETAM 1000 MILLIGRAM(S): 250 TABLET, FILM COATED ORAL at 05:54

## 2018-02-22 RX ADMIN — ATORVASTATIN CALCIUM 20 MILLIGRAM(S): 80 TABLET, FILM COATED ORAL at 00:22

## 2018-02-22 RX ADMIN — Medication 50 MICROGRAM(S): at 05:55

## 2018-02-22 RX ADMIN — Medication 650 MILLIGRAM(S): at 23:54

## 2018-02-22 RX ADMIN — Medication 25 MILLIGRAM(S): at 00:20

## 2018-02-22 RX ADMIN — SODIUM CHLORIDE 100 MILLILITER(S): 9 INJECTION INTRAMUSCULAR; INTRAVENOUS; SUBCUTANEOUS at 09:52

## 2018-02-22 RX ADMIN — HEPARIN SODIUM 5000 UNIT(S): 5000 INJECTION INTRAVENOUS; SUBCUTANEOUS at 00:20

## 2018-02-22 NOTE — DIETITIAN INITIAL EVALUATION ADULT. - MD RECOMMEND
Rec: When medically feasible to advance diet, consult SLP services to determine appropriate diet consistency in setting of patient with h/o dementia, CVA./other

## 2018-02-22 NOTE — DIETITIAN INITIAL EVALUATION ADULT. - OTHER INFO
Reason for RD assessment: NPO x5 days this admit. Pertinent medical information: per H&P, pt p/w 1 day history of abd pain & vomiting + no bowel function. h/o hysterectomy and PEG placement s/p stroke in 2016.  s/p SBO,  Ex-lap, lysed band and bowel pinked up, no RSX done. A.fib with RVR post-op. Rate control is reportedly difficulty & pt is refusing meds; may require med administration through NGT.

## 2018-02-22 NOTE — DIETITIAN INITIAL EVALUATION ADULT. - NS FNS WEIGHT USED FOR CALC
current/current wt is 61.6 kg. Reportedly bedscale wt. Drug dosing wt of 72.6 kg noted, however unclear how this wt was obtained. Will use 61.6 kg to assess. Ht is 165.1 cm. IBW is 56.8 kg.

## 2018-02-22 NOTE — PROGRESS NOTE ADULT - ASSESSMENT
Assessment and Plan:    Neurologic: AAO x 3. Motor 5/5. Sensory intact. left upper extremity paralysis, lle weakness  SEDATION: []yes [x]no  A/P: pain --> tylenol atc, d/c morphine  CVA with residual deficits    Respiratory: CTA B/L No wheeze, rales, rhochi.  Intubated: []yes [x]no  A/P: respiratory insuficiency --> chest pt, IS, mobilization,     Cardiovascular: RRR. No murmurs, rubs, gallops.  A/P: afib w/ rvr on cardizem -> start PO home meds    Gastrointestinal/Nutrition: Soft NTND; bowel sounds equal. NG in place. Midline incision is open to air, staples are intact.surrounding area is firm  A/P: d/c ngt --> NPO except meds, wound care per primary surgeon    Renal/Genitourinary: Luna in place: []yes [x]no  A/P: hypokalemia, repleted with repeat 3.9  urine collection pouch, incontinent    Hematologic: HB stable  A/P: 7.8 <--7.5<-- 8.3 stable, no need for further transfusions    Infectious Disease:   A/P: no diagnoses    Endocrine:   A/P: hypothyroid, on synthroid    MSK: 2+ peripheral pulses equal B/L. No clubbing, cyanosis, edema. No rashes or lesions.  A/P:  holding anticiag for bleeding, may consider restarting in next 24h given high risk    Lines/Tubes:    [x]peripheral IV    Disposition: consider transfer to tele today Assessment and Plan:    Neurologic: AAO x 3. Motor 5/5. Sensory intact. left upper extremity paralysis, lle weakness  SEDATION: []yes [x]no  A/P: pain --> tylenol atc, d/c morphine  CVA with residual deficits    Respiratory: CTA B/L No wheeze, rales, rhochi.  Intubated: []yes [x]no  A/P: respiratory insuficiency --> chest pt, IS, mobilization,     Cardiovascular: RRR. No murmurs, rubs, gallops.  A/P: afib w/ rvr on cardizem which was increased to 10 mg/g -> start PO home meds, refusing intermittently if continue to do so may require NG/ feeding tube for medication dministration    Gastrointestinal/Nutrition: Soft NT/ND; bowel sounds equal. NG in place. Midline incision is open to air, staples are intact. surrounding area is firm  A/P:  NPO except meds, wound care and diet advancement per primary surgeon    Renal/Genitourinary: Luna in place: []yes [x]no  A/P: hypokalemia, resolved, k+ now >4   incontinent    Hematologic: HB stable  A/P: 7.8 <--7.5<-- 8.3 stable, no need for further transfusions    Infectious Disease:   A/P: no diagnoses    Endocrine:   A/P: hypothyroid, on synthroid    MSK: 2+ peripheral pulses equal B/L. No clubbing, cyanosis, edema. No rashes or lesions.  A/P:  holding anticoagulation  for bleeding,  consider restarting today as H/H has been stable since transfusion 2/20.    [x]peripheral IV    Disposition: consider transfer to telemetry once able to further wean cardene

## 2018-02-22 NOTE — DIETITIAN INITIAL EVALUATION ADULT. - PHYSICAL APPEARANCE
BMI 22.6 (normal). Pt confused & disoriented. 1+ edema (B/L foot). Distended abdomen with fecal incontinence noted. Loose BMs noted; last BM 2/22. RN reports pt has difficulty swallowing foods/liquids. Mid abdomen incision noted. Nasogastric tube placed for suction, now d/c'd.

## 2018-02-22 NOTE — DIETITIAN INITIAL EVALUATION ADULT. - DIET TYPE
per attending, pt is passing flatus & diet may be advanced. Awaiting LIP orders for diet advancement./NPO

## 2018-02-22 NOTE — DIETITIAN INITIAL EVALUATION ADULT. - ENERGY NEEDS
Energy: 1348-6942 kcal/day (MSJx1.2-1.3 AF)    Protein: 61-73 g/day (1-1.2 g/kg ABW)    Fluids: Per ICU team.

## 2018-02-22 NOTE — PROGRESS NOTE ADULT - SUBJECTIVE AND OBJECTIVE BOX
IRA PACKER  [id number]  78y Female    Indication for ICU admission: Went into Afib with RVR post op, hemodynamic monitoring, s/p SBO,  Ex-lap, lysed band and bowel pinked up, no RSX done   Admit Date: 2/18/18  ICU Date: 2/20/18  OR Date: 2/18/18    No Known Allergies    PAST MEDICAL & SURGICAL HISTORY:  Seizures  Dementia  High cholesterol  Hypothyroidism, unspecified type  Hypertension, unspecified type  Diabetes  Cerebrovascular accident (CVA), unspecified mechanism  Atrial fibrillation, unspecified type  H/O abdominal hysterectomy  S/P percutaneous endoscopic gastrostomy (PEG) tube placement: s/p removal of peg tube    Home Medications:  aspirin 81 mg oral tablet, chewable:  (18 Feb 2018 08:46)  Diovan 160 mg oral tablet: 1 tab(s) orally once a day (18 Feb 2018 08:46)  Januvia:  (18 Feb 2018 08:46)  levETIRAcetam 1000 mg oral tablet: 1 tab(s) orally 2 times a day (18 Feb 2018 08:46)  levothyroxine 50 mcg (0.05 mg) oral tablet: 1 tab(s) orally once a day (18 Feb 2018 08:46)  Lipitor:  (18 Feb 2018 08:46)  metoprolol tartrate 50 mg oral tablet: 1 tab(s) orally 2 times a day (18 Feb 2018 08:46)  mirtazapine:  (18 Feb 2018 08:46)  Namenda:  (18 Feb 2018 08:46)  omeprazole 20 mg oral delayed release tablet: 1 tab(s) orally once a day (18 Feb 2018 08:46)  Pepcid 20 mg oral tablet: 1 tab(s) orally 2 times a day (18 Feb 2018 08:46)  Plavix: 75 milligram(s) orally (18 Feb 2018 08:46)  Toviaz 8 mg oral tablet, extended release: 1 tab(s) orally once a day (18 Feb 2018 08:46)  traZODone 50 mg oral tablet: 1 tab(s) orally 3 times a day (18 Feb 2018 08:46)  Zoloft 100 mg oral tablet: 1 tab(s) orally once a day (18 Feb 2018 08:46)        24HRS EVENT:  POD#4   weaning cardizem drip, dc ng tube and started po meds   readdress code status with family today  No events overnight    DVT PTX: Heparin SQ    GI PTX: Protonix 40mg daily    ***Tubes/Lines/Drains  ***  Peripheral IV      REVIEW OF SYSTEMS    [x ] A ten-point review of systems was otherwise negative except as noted.  [ ] Due to altered mental status/intubation, subjective information were not able to be obtained from the patient. History was obtained, to the extent possible, from review of the chart and collateral sources of information. IRA PACKER  [id number]  78y Female    Indication for ICU admission: Went into Afib with RVR post op, hemodynamic monitoring, s/p SBO,  Ex-lap, lysed band and bowel pinked up, no RSX done   Admit Date: 2/18/18  ICU Date: 2/20/18  OR Date: 2/18/18    No Known Allergies    PAST MEDICAL & SURGICAL HISTORY:  Seizures  Dementia  High cholesterol  Hypothyroidism, unspecified type  Hypertension, unspecified type  Diabetes  Cerebrovascular accident (CVA), unspecified mechanism  Atrial fibrillation, unspecified type  H/O abdominal hysterectomy  S/P percutaneous endoscopic gastrostomy (PEG) tube placement: s/p removal of peg tube    Home Medications:  aspirin 81 mg oral tablet, chewable:  (18 Feb 2018 08:46)  Diovan 160 mg oral tablet: 1 tab(s) orally once a day (18 Feb 2018 08:46)  Januvia:  (18 Feb 2018 08:46)  levETIRAcetam 1000 mg oral tablet: 1 tab(s) orally 2 times a day (18 Feb 2018 08:46)  levothyroxine 50 mcg (0.05 mg) oral tablet: 1 tab(s) orally once a day (18 Feb 2018 08:46)  Lipitor:  (18 Feb 2018 08:46)  metoprolol tartrate 50 mg oral tablet: 1 tab(s) orally 2 times a day (18 Feb 2018 08:46)  mirtazapine:  (18 Feb 2018 08:46)  Namenda:  (18 Feb 2018 08:46)  omeprazole 20 mg oral delayed release tablet: 1 tab(s) orally once a day (18 Feb 2018 08:46)  Pepcid 20 mg oral tablet: 1 tab(s) orally 2 times a day (18 Feb 2018 08:46)  Plavix: 75 milligram(s) orally (18 Feb 2018 08:46)  Toviaz 8 mg oral tablet, extended release: 1 tab(s) orally once a day (18 Feb 2018 08:46)  traZODone 50 mg oral tablet: 1 tab(s) orally 3 times a day (18 Feb 2018 08:46)  Zoloft 100 mg oral tablet: 1 tab(s) orally once a day (18 Feb 2018 08:46)        24HRS EVENT:  POD#4   weaning cardizem drip, dc ng tube and started po meds   readdress code status with family today  No events overnight    DVT PTX: Heparin SQ    GI PTX: Protonix 40mg daily    ***Tubes/Lines/Drains  ***  Peripheral IV      REVIEW OF SYSTEMS    [x ] A ten-point review of systems was otherwise negative except as noted.  [ ] Due to altered mental status/intubation, subjective information were not able to be obtained from the patient. History was obtained, to the extent possible, from review of the chart and collateral sources of information.  Daily Height in cm: 165.1 (21 Feb 2018 07:57)    Daily     Diet, NPO:        NPO Start Date: 18-Feb-2018,   NPO Start Time: 00:59  Except Medications (02-18-18 @ 23:22)      CURRENT MEDS:  Neurologic Medications  acetaminophen   Tablet. 650 milliGRAM(s) Oral every 6 hours  levETIRAcetam 1000 milliGRAM(s) Oral two times a day  memantine 5 milliGRAM(s) Oral two times a day  sertraline 100 milliGRAM(s) Oral daily  traZODone 50 milliGRAM(s) Oral at bedtime    Cardiovascular Medications  diltiazem Infusion 15 mG/Hr IV Continuous <Continuous>  metoprolol     tartrate 50 milliGRAM(s) Oral two times a day  valsartan 160 milliGRAM(s) Oral daily    Gastrointestinal Medications  pantoprazole    Tablet 40 milliGRAM(s) Oral before breakfast  sodium chloride 0.9%. 1000 milliLiter(s) IV Continuous <Continuous>      Hematologic/Oncologic Medications  aspirin  chewable 81 milliGRAM(s) Oral daily  heparin  Injectable 5000 Unit(s) SubCutaneous every 8 hours      Endocrine/Metabolic Medications  atorvastatin 20 milliGRAM(s) Oral at bedtime  levothyroxine 50 MICROGram(s) Oral daily      ICU Vital Signs Last 24 Hrs  T(C): 37.3 (22 Feb 2018 04:00), Max: 37.4 (22 Feb 2018 00:03)  T(F): 99.2 (22 Feb 2018 04:00), Max: 99.3 (22 Feb 2018 00:03)  HR: 110 (22 Feb 2018 06:00) (86 - 126)  BP: 145/83 (22 Feb 2018 06:00) (115/69 - 169/75)  BP(mean): 105 (22 Feb 2018 06:00) (73 - 153)  RR: 15 (22 Feb 2018 06:00) (14 - 32)  SpO2: 100% (22 Feb 2018 06:00) (97% - 100%)    ABG - ( 20 Feb 2018 13:00 )  pH: 7.42  /  pCO2: 31    /  pO2: 66    / HCO3: 20    / Base Excess: -4.3  /  SaO2: 95          I&O's Summary    21 Feb 2018 07:01  -  22 Feb 2018 07:00  --------------------------------------------------------  IN: 2247 mL / OUT: 0 mL / NET: 2247 mL- patient is incontinent      I&O's Detail    21 Feb 2018 07:01  -  22 Feb 2018 07:00  --------------------------------------------------------  IN:    diltiazem Infusion: 247 mL    sodium chloride 0.9%.: 2000 mL    Total IN: 2247 mL    Total NET: 2247 mL    PHYSICAL EXAM:    General/Neuro  RASS:             GCS:  15                 Confused and paranoid- (baseline dementia)             left upper extremity paralysis 2/2 prior CVA    Lungs:      clear to auscultation, Normal expansion/effort.     Cardiovascular :  S1S2 Rythm irregular  Atrial fibrillation- rate 108    GI: Abdomen soft, Non-tender, mild distention Bowel sounds present, having BMs    Wound: Midline incision clean and dry staples intact. open to air, no drainage    Extremities: Extremities warm, pink, well-perfused.     Derm: Good skin turgor, no skin breakdown.      :       Luna catheter in place.    LABS:                          8.1    5.10  )-----------( 340      ( 22 Feb 2018 01:27 )             25.1       02-22    141  |  113<H>  |  11  ----------------------------<  110  4.2   |  19  |  0.6<L>    Ca    7.9<L>      22 Feb 2018 01:27  Phos  2.7     02-22  Mg     2.1     02-22        PT/INR - ( 22 Feb 2018 01:27 )   PT: 9.90 sec;   INR: 0.92 ratio         PTT - ( 22 Feb 2018 01:27 )  PTT:22.2 sec  CARDIAC MARKERS ( 21 Feb 2018 04:44 )  0.06 ng/mL / x     / 421 U/L / x     / 4.6 ng/mL  CARDIAC MARKERS ( 20 Feb 2018 23:40 )  0.11 ng/mL / x     / 435 U/L / x     / 5.2 ng/mL  CARDIAC MARKERS ( 20 Feb 2018 11:05 )  0.17 ng/mL / x     / 482 U/L / x     / 5.1 ng/mL

## 2018-02-22 NOTE — DIETITIAN INITIAL EVALUATION ADULT. - INDICATOR
Diet order, energy intake, glucose profile, electrolyte profile, nutrition focused physical findings

## 2018-02-23 PROBLEM — Z00.00 ENCOUNTER FOR PREVENTIVE HEALTH EXAMINATION: Status: ACTIVE | Noted: 2018-02-23

## 2018-02-23 LAB
HCT VFR BLD CALC: 28.2 % — LOW (ref 37–47)
HGB BLD-MCNC: 9 G/DL — LOW (ref 14–18)
MCHC RBC-ENTMCNC: 26.4 PG — LOW (ref 27–31)
MCHC RBC-ENTMCNC: 31.9 G/DL — LOW (ref 32–37)
MCV RBC AUTO: 82.7 FL — SIGNIFICANT CHANGE UP (ref 81–91)
NRBC # BLD: 2 /100 WBCS — HIGH (ref 0–0)
PLATELET # BLD AUTO: 411 K/UL — HIGH (ref 130–400)
RBC # BLD: 3.41 M/UL — LOW (ref 4.2–5.4)
RBC # FLD: 19 % — HIGH (ref 11.5–14.5)
WBC # BLD: 8.2 K/UL — SIGNIFICANT CHANGE UP (ref 4.8–10.8)
WBC # FLD AUTO: 8.2 K/UL — SIGNIFICANT CHANGE UP (ref 4.8–10.8)

## 2018-02-23 RX ORDER — DIGOXIN 250 MCG
0.25 TABLET ORAL EVERY 6 HOURS
Qty: 0 | Refills: 0 | Status: COMPLETED | OUTPATIENT
Start: 2018-02-23 | End: 2018-02-24

## 2018-02-23 RX ORDER — DILTIAZEM HCL 120 MG
1 CAPSULE, EXT RELEASE 24 HR ORAL
Qty: 125 | Refills: 0 | Status: DISCONTINUED | OUTPATIENT
Start: 2018-02-23 | End: 2018-02-25

## 2018-02-23 RX ORDER — METOPROLOL TARTRATE 50 MG
100 TABLET ORAL
Qty: 0 | Refills: 0 | Status: DISCONTINUED | OUTPATIENT
Start: 2018-02-23 | End: 2018-02-26

## 2018-02-23 RX ORDER — METOPROLOL TARTRATE 50 MG
50 TABLET ORAL ONCE
Qty: 0 | Refills: 0 | Status: COMPLETED | OUTPATIENT
Start: 2018-02-23 | End: 2018-02-23

## 2018-02-23 RX ORDER — KETOROLAC TROMETHAMINE 30 MG/ML
15 SYRINGE (ML) INJECTION ONCE
Qty: 0 | Refills: 0 | Status: DISCONTINUED | OUTPATIENT
Start: 2018-02-23 | End: 2018-02-23

## 2018-02-23 RX ORDER — DIGOXIN 250 MCG
0.5 TABLET ORAL ONCE
Qty: 0 | Refills: 0 | Status: COMPLETED | OUTPATIENT
Start: 2018-02-23 | End: 2018-02-23

## 2018-02-23 RX ORDER — DIGOXIN 250 MCG
0.25 TABLET ORAL DAILY
Qty: 0 | Refills: 0 | Status: DISCONTINUED | OUTPATIENT
Start: 2018-02-24 | End: 2018-02-26

## 2018-02-23 RX ORDER — WARFARIN SODIUM 2.5 MG/1
2.5 TABLET ORAL ONCE
Qty: 0 | Refills: 0 | Status: COMPLETED | OUTPATIENT
Start: 2018-02-23 | End: 2018-02-23

## 2018-02-23 RX ADMIN — SERTRALINE 100 MILLIGRAM(S): 25 TABLET, FILM COATED ORAL at 11:05

## 2018-02-23 RX ADMIN — Medication 15 MG/HR: at 00:45

## 2018-02-23 RX ADMIN — Medication 650 MILLIGRAM(S): at 11:35

## 2018-02-23 RX ADMIN — Medication 0.25 MILLIGRAM(S): at 18:11

## 2018-02-23 RX ADMIN — HEPARIN SODIUM 5000 UNIT(S): 5000 INJECTION INTRAVENOUS; SUBCUTANEOUS at 05:35

## 2018-02-23 RX ADMIN — HEPARIN SODIUM 5000 UNIT(S): 5000 INJECTION INTRAVENOUS; SUBCUTANEOUS at 22:26

## 2018-02-23 RX ADMIN — Medication 50 MILLIGRAM(S): at 22:36

## 2018-02-23 RX ADMIN — MEMANTINE HYDROCHLORIDE 5 MILLIGRAM(S): 10 TABLET ORAL at 17:01

## 2018-02-23 RX ADMIN — Medication 50 MICROGRAM(S): at 05:43

## 2018-02-23 RX ADMIN — Medication 650 MILLIGRAM(S): at 05:40

## 2018-02-23 RX ADMIN — Medication 0.5 MILLIGRAM(S): at 12:22

## 2018-02-23 RX ADMIN — LEVETIRACETAM 1000 MILLIGRAM(S): 250 TABLET, FILM COATED ORAL at 05:36

## 2018-02-23 RX ADMIN — ATORVASTATIN CALCIUM 20 MILLIGRAM(S): 80 TABLET, FILM COATED ORAL at 22:26

## 2018-02-23 RX ADMIN — MEMANTINE HYDROCHLORIDE 5 MILLIGRAM(S): 10 TABLET ORAL at 05:44

## 2018-02-23 RX ADMIN — PANTOPRAZOLE SODIUM 40 MILLIGRAM(S): 20 TABLET, DELAYED RELEASE ORAL at 08:51

## 2018-02-23 RX ADMIN — Medication 50 MILLIGRAM(S): at 16:51

## 2018-02-23 RX ADMIN — Medication 81 MILLIGRAM(S): at 11:04

## 2018-02-23 RX ADMIN — HEPARIN SODIUM 5000 UNIT(S): 5000 INJECTION INTRAVENOUS; SUBCUTANEOUS at 13:10

## 2018-02-23 RX ADMIN — SODIUM CHLORIDE 100 MILLILITER(S): 9 INJECTION INTRAMUSCULAR; INTRAVENOUS; SUBCUTANEOUS at 00:45

## 2018-02-23 RX ADMIN — VALSARTAN 160 MILLIGRAM(S): 80 TABLET ORAL at 05:35

## 2018-02-23 RX ADMIN — Medication 15 MILLIGRAM(S): at 15:20

## 2018-02-23 RX ADMIN — Medication 650 MILLIGRAM(S): at 11:05

## 2018-02-23 RX ADMIN — LEVETIRACETAM 1000 MILLIGRAM(S): 250 TABLET, FILM COATED ORAL at 17:01

## 2018-02-23 RX ADMIN — WARFARIN SODIUM 2.5 MILLIGRAM(S): 2.5 TABLET ORAL at 22:26

## 2018-02-23 RX ADMIN — Medication 50 MILLIGRAM(S): at 05:43

## 2018-02-23 NOTE — PROVIDER CONTACT NOTE (OTHER) - ACTION/TREATMENT ORDERED:
Dr. Maynard aware that pt needs DNR/DNI paperwork signed for hospital, he said he was waiting for the daughter to come.
Torodol 15 administered and EKG performed; pt currently being transferred back to bed.

## 2018-02-23 NOTE — PROGRESS NOTE ADULT - SUBJECTIVE AND OBJECTIVE BOX
IRA PACKER  [id number]  78y Female    Indication for ICU admission: Went into Afib with RVR post op, hemodynamic monitoring, s/p SBO,  Ex-lap, lysed band and bowel pinked up, no RSX done   Admit Date: 2/18/18  ICU Date: 2/20/18  OR Date: 2/18/18    No Known Allergies    PAST MEDICAL & SURGICAL HISTORY:  Seizures  Dementia  High cholesterol  Hypothyroidism, unspecified type  Hypertension, unspecified type  Diabetes  Cerebrovascular accident (CVA), unspecified mechanism  Atrial fibrillation, unspecified type  H/O abdominal hysterectomy  S/P percutaneous endoscopic gastrostomy (PEG) tube placement: s/p removal of peg tube    Home Medications:  aspirin 81 mg oral tablet, chewable:  (18 Feb 2018 08:46)  Diovan 160 mg oral tablet: 1 tab(s) orally once a day (18 Feb 2018 08:46)  Januvia:  (18 Feb 2018 08:46)  levETIRAcetam 1000 mg oral tablet: 1 tab(s) orally 2 times a day (18 Feb 2018 08:46)  levothyroxine 50 mcg (0.05 mg) oral tablet: 1 tab(s) orally once a day (18 Feb 2018 08:46)  Lipitor:  (18 Feb 2018 08:46)  metoprolol tartrate 50 mg oral tablet: 1 tab(s) orally 2 times a day (18 Feb 2018 08:46)  mirtazapine:  (18 Feb 2018 08:46)  Namenda:  (18 Feb 2018 08:46)  omeprazole 20 mg oral delayed release tablet: 1 tab(s) orally once a day (18 Feb 2018 08:46)  Pepcid 20 mg oral tablet: 1 tab(s) orally 2 times a day (18 Feb 2018 08:46)  Plavix: 75 milligram(s) orally (18 Feb 2018 08:46)  Toviaz 8 mg oral tablet, extended release: 1 tab(s) orally once a day (18 Feb 2018 08:46)  traZODone 50 mg oral tablet: 1 tab(s) orally 3 times a day (18 Feb 2018 08:46)  Zoloft 100 mg oral tablet: 1 tab(s) orally once a day (18 Feb 2018 08:46)        24HRS EVENT:  Patient has episodes of agitation, refuses to take her meds  Has gotten the po metoprol the past 24hr.  Continues to require high dose cardizem gtt    NG remains out with no vomitting, abdomen remains non distended, NPO except meds    DVT PTX: Heparin SQ    GI PTX: Protonix 40mg daily    ***Tubes/Lines/Drains  ***  Peripheral IV      REVIEW OF SYSTEMS    [x ] A ten-point review of systems was otherwise negative except as noted.  [ ] Due to altered mental status/intubation, subjective information were not able to be obtained from the patient. History was obtained, to the extent possible, from review of the chart and collateral sources of information. IRA PACKER  [id number]  78y Female    Indication for ICU admission: Went into Afib with RVR post op, hemodynamic monitoring, s/p SBO,  Ex-lap, lysed band and bowel pinked up, no RSX done   Admit Date: 18  ICU Date: 18  OR Date: 18    No Known Allergies    PAST MEDICAL & SURGICAL HISTORY:  Seizures  Dementia  High cholesterol  Hypothyroidism, unspecified type  Hypertension, unspecified type  Diabetes  Cerebrovascular accident (CVA), unspecified mechanism  Atrial fibrillation, unspecified type  H/O abdominal hysterectomy  S/P percutaneous endoscopic gastrostomy (PEG) tube placement: s/p removal of peg tube    Home Medications:  aspirin 81 mg oral tablet, chewable:  (2018 08:46)  Diovan 160 mg oral tablet: 1 tab(s) orally once a day (2018 08:46)  Januvia:  (2018 08:46)  levETIRAcetam 1000 mg oral tablet: 1 tab(s) orally 2 times a day (2018 08:46)  levothyroxine 50 mcg (0.05 mg) oral tablet: 1 tab(s) orally once a day (2018 08:46)  Lipitor:  (2018 08:46)  metoprolol tartrate 50 mg oral tablet: 1 tab(s) orally 2 times a day (2018 08:46)  mirtazapine:  (2018 08:46)  Namenda:  (2018 08:46)  omeprazole 20 mg oral delayed release tablet: 1 tab(s) orally once a day (2018 08:46)  Pepcid 20 mg oral tablet: 1 tab(s) orally 2 times a day (2018 08:46)  Plavix: 75 milligram(s) orally (2018 08:46)  Toviaz 8 mg oral tablet, extended release: 1 tab(s) orally once a day (2018 08:46)  traZODone 50 mg oral tablet: 1 tab(s) orally 3 times a day (2018 08:46)  Zoloft 100 mg oral tablet: 1 tab(s) orally once a day (2018 08:46)        24HRS EVENT:  Patient has episodes of agitation, refuses to take her meds  Has gotten the po metoprol the past 24hr.  Continues to require high dose cardizem gtt    NG remains out with no vomitting, abdomen remains non distended, NPO except meds    DVT PTX: Heparin SQ    GI PTX: Protonix 40mg daily    ***Tubes/Lines/Drains  ***  Peripheral IV      REVIEW OF SYSTEMS    [x ] A ten-point review of systems was otherwise negative except as noted.  [ ] Due to altered mental status/intubation, subjective information were not able to be obtained from the patient. History was obtained, to the extent possible, from review of the chart and collateral sources of information.    Daily Height in cm: 165.1 (2018 19:24)    Daily Weight in k.8 (2018 04:00)    Diet, Clear Liquid (18 @ 07:26)  Diet, Low Fiber (18 @ 07:26)      CURRENT MEDS:  Neurologic Medications  acetaminophen   Tablet. 650 milliGRAM(s) Oral every 6 hours  levETIRAcetam 1000 milliGRAM(s) Oral two times a day  memantine 5 milliGRAM(s) Oral two times a day  sertraline 100 milliGRAM(s) Oral daily  traZODone 50 milliGRAM(s) Oral at bedtime    Respiratory Medications    Cardiovascular Medications  diltiazem Infusion 15 mG/Hr IV Continuous <Continuous>  metoprolol     tartrate 50 milliGRAM(s) Oral two times a day  valsartan 160 milliGRAM(s) Oral daily    Gastrointestinal Medications  pantoprazole    Tablet 40 milliGRAM(s) Oral before breakfast  sodium chloride 0.9%. 1000 milliLiter(s) IV Continuous <Continuous>    Genitourinary Medications    Hematologic/Oncologic Medications  aspirin  chewable 81 milliGRAM(s) Oral daily  heparin  Injectable 5000 Unit(s) SubCutaneous every 8 hours    Antimicrobial/Immunologic Medications    Endocrine/Metabolic Medications  atorvastatin 20 milliGRAM(s) Oral at bedtime  levothyroxine 50 MICROGram(s) Oral daily    Topical/Other Medications      ICU Vital Signs Last 24 Hrs  T(C): 37.1 (2018 04:00), Max: 37.1 (2018 04:00)  T(F): 98.8 (2018 04:00), Max: 98.8 (2018 04:00)  HR: 90 (2018 07:00) (90 - 130)  BP: 145/69 (2018 07:00) (110/77 - 180/106)  BP(mean): 106 (2018 07:00) (82 - 140)  ABP: --  ABP(mean): --  RR: 16 (2018 07:00) (15 - 24)  SpO2: 98% (:) (83% - 100%)          I&O's Summary    2018 07:  -  2018 07:00  --------------------------------------------------------  IN: 2610 mL / OUT: 0 mL / NET: 2610 mL      I&O's Detail    2018 07:  -  2018 07:00  --------------------------------------------------------  IN:    diltiazem Infusion: 310 mL    sodium chloride 0.9%.: 2300 mL  Total IN: 2610 mL    OUT:  Total OUT: 0 mL    Total NET: 2610 mL          PHYSICAL EXAM:    General/Neuro  RASS:             GCS:  14     Deficits:                             alert     Lungs:      clear to auscultation, Normal expansion/effort.     Cardiovascular : S1, S2.  tachycardic    GI: Abdomen soft, Non-tender, Non-distended.      Wound:c/d/i    Extremities: Extremities warm, pink, well-perfused.     Derm: Good skin turgor, no skin breakdown.        CXR:     LABS:    Labs:  CAPILLARY BLOOD GLUCOSE                              9.0    8.20  )-----------( 411      ( 2018 05:36 )             28.2         02-    141  |  113<H>  |  11  ----------------------------<  110  4.2   |  19  |  0.6<L>          LFTs:     Blood Gas Arterial, Lactate: 0.6 mmoL/L (18 @ 13:00)      Coags:     9.90   ----< 0.92    ( 2018 01:27 )     22.2

## 2018-02-23 NOTE — CHART NOTE - NSCHARTNOTEFT_GEN_A_CORE
Patient tachycardic but hemodynamically stable  stable for downgrade to telemetry  During ICU stay,   -was started on Cardizem IV gtt, and PO,   -uptitrated Lopressor,   -added Digoxin, load today, standing dose tomorrow, check level on Sunday   Coumadin ordered for tonight (family to confirm home dose)  Signout given to primary team (trauma) MARK Hardy

## 2018-02-23 NOTE — PROGRESS NOTE ADULT - ASSESSMENT
Assessment and Plan:    Neurologic: AAO x 3. Motor 5/5. Sensory intact. left upper extremity paralysis, lle weakness  SEDATION: []yes [x]no  A/P: pain --> tylenol atc, d/c morphine  CVA with residual deficits    Respiratory: CTA B/L No wheeze, rales, rhochi.  Intubated: []yes [x]no  A/P: respiratory insuficiency --> chest pt, IS, mobilization,     Cardiovascular: RRR. No murmurs, rubs, gallops.  A/P: afib w/ rvr on cardizem which was increased to 10 mg/g -> start PO home meds, refusing intermittently if continue to do so may require NG/ feeding tube for medication dministration    Gastrointestinal/Nutrition: Soft NT/ND; bowel sounds equal. Midline incision is open to air, staples are intact. surrounding area is firm  A/P:  NPO except meds, wound care and diet advancement per primary surgeon    Renal/Genitourinary: Luna in place: []yes [x]no  A/P: hypokalemia, resolved, k+ now >4   incontinent    Hematologic: HB stable  A/P: 7.8 <--7.5<-- 8.3 stable, no need for further transfusions    Infectious Disease:   A/P: no diagnoses    Endocrine:   A/P: hypothyroid, on synthroid    MSK: 2+ peripheral pulses equal B/L. No clubbing, cyanosis, edema. No rashes or lesions.  A/P:  holding anticoagulation  for bleeding,  consider restarting today as H/H has been stable since transfusion 2/20.    [x]peripheral IV    Disposition: consider transfer to telemetry once able to further wean cardene Assessment and Plan:    Neurologic: AAO x 3. Motor 5/5. Sensory intact. left upper extremity paralysis, lle weakness  SEDATION: []yes [x]no  A/P: pain --> tylenol atc, d/c morphine  CVA with residual deficits    Respiratory: CTA B/L No wheeze, rales, rhochi.  Intubated: []yes [x]no  A/P: respiratory insuficiency --> chest pt, IS, mobilization,     Cardiovascular: RRR. No murmurs, rubs, gallops.  A/P: afib w/ rvr on cardizem which was increased to 12.5 mg/hr -> start PO home meds, refusing intermittently if continue to do so may require NG/ feeding tube for medication dministration    Gastrointestinal/Nutrition: Soft NT/ND; bowel sounds equal. Midline incision is open to air, staples are intact. surrounding area is firm  A/P:  NPO except meds, wound care and diet advancement per primary surgeon    Renal/Genitourinary: Luna in place: []yes [x]no  A/P: hypokalemia, resolved, k+ now >4   incontinent    Hematologic: HB stable  A/P: 7.8 <--7.5<-- 8.3 stable, no need for further transfusions    Infectious Disease:   A/P: no diagnoses    Endocrine:   A/P: hypothyroid, on synthroid    MSK: 2+ peripheral pulses equal B/L. No clubbing, cyanosis, edema. No rashes or lesions.  A/P:  holding anticoagulation  for bleeding,  consider restarting today as H/H has been stable since transfusion 2/20.    [x]peripheral IV    Disposition: consider transfer to telemetry once able to further wean cardene

## 2018-02-24 DIAGNOSIS — K56.609 UNSPECIFIED INTESTINAL OBSTRUCTION, UNSPECIFIED AS TO PARTIAL VERSUS COMPLETE OBSTRUCTION: ICD-10-CM

## 2018-02-24 LAB
ANION GAP SERPL CALC-SCNC: 12 MMOL/L — SIGNIFICANT CHANGE UP (ref 7–14)
APTT BLD: 24.9 SEC — LOW (ref 27–39.2)
BUN SERPL-MCNC: 9 MG/DL — LOW (ref 10–20)
CALCIUM SERPL-MCNC: 8.2 MG/DL — LOW (ref 8.5–10.1)
CHLORIDE SERPL-SCNC: 111 MMOL/L — HIGH (ref 98–110)
CO2 SERPL-SCNC: 16 MMOL/L — LOW (ref 17–32)
CREAT SERPL-MCNC: 0.8 MG/DL — SIGNIFICANT CHANGE UP (ref 0.7–1.5)
GLUCOSE SERPL-MCNC: 122 MG/DL — HIGH (ref 70–110)
HCT VFR BLD CALC: 30.1 % — LOW (ref 37–47)
HGB BLD-MCNC: 9.6 G/DL — LOW (ref 14–18)
INR BLD: 0.98 RATIO — SIGNIFICANT CHANGE UP (ref 0.65–1.3)
MAGNESIUM SERPL-MCNC: 1.8 MG/DL — SIGNIFICANT CHANGE UP (ref 1.8–2.4)
MCHC RBC-ENTMCNC: 26.7 PG — LOW (ref 27–31)
MCHC RBC-ENTMCNC: 31.9 G/DL — LOW (ref 32–37)
MCV RBC AUTO: 83.8 FL — SIGNIFICANT CHANGE UP (ref 81–91)
NRBC # BLD: 2 /100 WBCS — HIGH (ref 0–0)
PHOSPHATE SERPL-MCNC: 2.4 MG/DL — SIGNIFICANT CHANGE UP (ref 2.1–4.9)
PLATELET # BLD AUTO: 497 K/UL — HIGH (ref 130–400)
POTASSIUM SERPL-MCNC: 3.7 MMOL/L — SIGNIFICANT CHANGE UP (ref 3.5–5)
POTASSIUM SERPL-SCNC: 3.7 MMOL/L — SIGNIFICANT CHANGE UP (ref 3.5–5)
PROTHROM AB SERPL-ACNC: 10.6 SEC — SIGNIFICANT CHANGE UP (ref 9.95–12.87)
RBC # BLD: 3.59 M/UL — LOW (ref 4.2–5.4)
RBC # FLD: 19.2 % — HIGH (ref 11.5–14.5)
SODIUM SERPL-SCNC: 139 MMOL/L — SIGNIFICANT CHANGE UP (ref 135–146)
WBC # BLD: 8.89 K/UL — SIGNIFICANT CHANGE UP (ref 4.8–10.8)
WBC # FLD AUTO: 8.89 K/UL — SIGNIFICANT CHANGE UP (ref 4.8–10.8)

## 2018-02-24 RX ORDER — WARFARIN SODIUM 2.5 MG/1
5 TABLET ORAL ONCE
Qty: 0 | Refills: 0 | Status: COMPLETED | OUTPATIENT
Start: 2018-02-24 | End: 2018-02-24

## 2018-02-24 RX ORDER — POTASSIUM CHLORIDE 20 MEQ
20 PACKET (EA) ORAL ONCE
Qty: 0 | Refills: 0 | Status: COMPLETED | OUTPATIENT
Start: 2018-02-24 | End: 2018-02-24

## 2018-02-24 RX ORDER — SENNA PLUS 8.6 MG/1
1 TABLET ORAL DAILY
Qty: 0 | Refills: 0 | Status: DISCONTINUED | OUTPATIENT
Start: 2018-02-24 | End: 2018-02-26

## 2018-02-24 RX ORDER — DOCUSATE SODIUM 100 MG
100 CAPSULE ORAL DAILY
Qty: 0 | Refills: 0 | Status: DISCONTINUED | OUTPATIENT
Start: 2018-02-24 | End: 2018-02-26

## 2018-02-24 RX ADMIN — LEVETIRACETAM 1000 MILLIGRAM(S): 250 TABLET, FILM COATED ORAL at 17:22

## 2018-02-24 RX ADMIN — Medication 650 MILLIGRAM(S): at 17:23

## 2018-02-24 RX ADMIN — Medication 650 MILLIGRAM(S): at 12:08

## 2018-02-24 RX ADMIN — Medication 50 MILLIEQUIVALENT(S): at 16:14

## 2018-02-24 RX ADMIN — Medication 650 MILLIGRAM(S): at 12:50

## 2018-02-24 RX ADMIN — MEMANTINE HYDROCHLORIDE 5 MILLIGRAM(S): 10 TABLET ORAL at 06:00

## 2018-02-24 RX ADMIN — HEPARIN SODIUM 5000 UNIT(S): 5000 INJECTION INTRAVENOUS; SUBCUTANEOUS at 06:02

## 2018-02-24 RX ADMIN — ATORVASTATIN CALCIUM 20 MILLIGRAM(S): 80 TABLET, FILM COATED ORAL at 21:34

## 2018-02-24 RX ADMIN — HEPARIN SODIUM 5000 UNIT(S): 5000 INJECTION INTRAVENOUS; SUBCUTANEOUS at 21:35

## 2018-02-24 RX ADMIN — Medication 100 MILLIGRAM(S): at 05:59

## 2018-02-24 RX ADMIN — Medication 650 MILLIGRAM(S): at 05:58

## 2018-02-24 RX ADMIN — Medication 100 MILLIGRAM(S): at 12:05

## 2018-02-24 RX ADMIN — LEVETIRACETAM 1000 MILLIGRAM(S): 250 TABLET, FILM COATED ORAL at 05:58

## 2018-02-24 RX ADMIN — WARFARIN SODIUM 5 MILLIGRAM(S): 2.5 TABLET ORAL at 22:49

## 2018-02-24 RX ADMIN — Medication 0.25 MILLIGRAM(S): at 00:38

## 2018-02-24 RX ADMIN — Medication 0.25 MILLIGRAM(S): at 06:00

## 2018-02-24 RX ADMIN — SERTRALINE 100 MILLIGRAM(S): 25 TABLET, FILM COATED ORAL at 12:08

## 2018-02-24 RX ADMIN — VALSARTAN 160 MILLIGRAM(S): 80 TABLET ORAL at 06:00

## 2018-02-24 RX ADMIN — Medication 50 MICROGRAM(S): at 06:00

## 2018-02-24 RX ADMIN — Medication 100 MILLIGRAM(S): at 17:22

## 2018-02-24 RX ADMIN — SENNA PLUS 1 TABLET(S): 8.6 TABLET ORAL at 12:05

## 2018-02-24 RX ADMIN — PANTOPRAZOLE SODIUM 40 MILLIGRAM(S): 20 TABLET, DELAYED RELEASE ORAL at 06:02

## 2018-02-24 RX ADMIN — Medication 650 MILLIGRAM(S): at 00:38

## 2018-02-24 RX ADMIN — HEPARIN SODIUM 5000 UNIT(S): 5000 INJECTION INTRAVENOUS; SUBCUTANEOUS at 13:15

## 2018-02-24 RX ADMIN — Medication 81 MILLIGRAM(S): at 12:08

## 2018-02-24 RX ADMIN — Medication 50 MILLIGRAM(S): at 21:35

## 2018-02-24 RX ADMIN — MEMANTINE HYDROCHLORIDE 5 MILLIGRAM(S): 10 TABLET ORAL at 17:22

## 2018-02-24 RX ADMIN — Medication 1 MG/HR: at 22:49

## 2018-02-24 NOTE — PROGRESS NOTE ADULT - SUBJECTIVE AND OBJECTIVE BOX
[facility] T6-3C 022 A  IRA MCKENZIEINITSER  78yFemale  [id number]    SURGICAL INTERN PROGRESS NOTE  HPI: 78F admitted with n/v/bloody diarrhea. Found to have high grade SBO.    HOSPITAL DAY: 7  POST OPERATIVE DAY #: 6  STATUS POST: ex-lap, lysis of adhesions  OVERNIGHT EVENTS: patient downgraded to 3C from ICU, resumed coumadin, digoxin  VITALS: Vital Signs Last 24 Hrs  T(C): 37.2 (24 Feb 2018 03:18), Max: 37.6 (23 Feb 2018 16:00)  T(F): 99 (24 Feb 2018 03:18), Max: 99.6 (23 Feb 2018 16:00)  HR: 93 (24 Feb 2018 03:18) (80 - 122)  BP: 150/76 (24 Feb 2018 03:18) (131/79 - 176/85)  BP(mean): 96 (23 Feb 2018 16:00) (94 - 137)  RR: 18 (24 Feb 2018 03:18) (16 - 26)  SpO2: 100% (23 Feb 2018 16:30) (97% - 100%)  I/O:I&O's Summary    22 Feb 2018 07:01  -  23 Feb 2018 07:00  --------------------------------------------------------  IN: 2610 mL / OUT: 0 mL / NET: 2610 mL    23 Feb 2018 07:01  -  24 Feb 2018 05:13  --------------------------------------------------------  IN: 1212.5 mL / OUT: 0 mL / NET: 1212.5 mL       02-22-18 @ 07:01  -  02-23-18 @ 07:00  --------------------------------------------------------  IN: 2610 mL / OUT: 0 mL / NET: 2610 mL    02-23-18 @ 07:01  -  02-24-18 @ 05:13  --------------------------------------------------------  IN: 1212.5 mL / OUT: 0 mL / NET: 1212.5 mL    PHYSICAL EXAM: elderly female in NAD. abdomen soft and nondistended. incisions c/d/i    MEDICATIONS  (STANDING):  acetaminophen   Tablet. 650 milliGRAM(s) Oral every 6 hours  aspirin  chewable 81 milliGRAM(s) Oral daily  atorvastatin 20 milliGRAM(s) Oral at bedtime  digoxin     Tablet 0.25 milliGRAM(s) Oral daily  diltiazem Infusion 1 mG/Hr (1 mL/Hr) IV Continuous <Continuous>  heparin  Injectable 5000 Unit(s) SubCutaneous every 8 hours  levETIRAcetam 1000 milliGRAM(s) Oral two times a day  levothyroxine 50 MICROGram(s) Oral daily  memantine 5 milliGRAM(s) Oral two times a day  metoprolol     tartrate 100 milliGRAM(s) Oral two times a day  pantoprazole    Tablet 40 milliGRAM(s) Oral before breakfast  sertraline 100 milliGRAM(s) Oral daily  traZODone 50 milliGRAM(s) Oral at bedtime  valsartan 160 milliGRAM(s) Oral daily    MEDICATIONS  (PRN):      LABS:                        9.6    8.89  )-----------( 497      ( 24 Feb 2018 01:17 )             30.1     02-24    139  |  111<H>  |  9<L>  ----------------------------<  122<H>  3.7   |  16<L>  |  0.8    Ca    8.2<L>      24 Feb 2018 01:17  Phos  2.4     02-24  Mg     1.8     02-24      PT/INR - ( 24 Feb 2018 01:17 )   PT: 10.60 sec;   INR: 0.98 ratio         PTT - ( 24 Feb 2018 01:17 )  PTT:24.9 sec                RADIOLOGY & ADDITIONAL STUDIES:

## 2018-02-25 LAB
ANION GAP SERPL CALC-SCNC: 7 MMOL/L — SIGNIFICANT CHANGE UP (ref 7–14)
BASOPHILS # BLD AUTO: 0.03 K/UL — SIGNIFICANT CHANGE UP (ref 0–0.2)
BASOPHILS NFR BLD AUTO: 0.3 % — SIGNIFICANT CHANGE UP (ref 0–1)
BUN SERPL-MCNC: 9 MG/DL — LOW (ref 10–20)
CALCIUM SERPL-MCNC: 8.3 MG/DL — LOW (ref 8.5–10.1)
CHLORIDE SERPL-SCNC: 111 MMOL/L — HIGH (ref 98–110)
CO2 SERPL-SCNC: 20 MMOL/L — SIGNIFICANT CHANGE UP (ref 17–32)
CREAT SERPL-MCNC: 0.6 MG/DL — LOW (ref 0.7–1.5)
DIGOXIN SERPL-MCNC: 0.4 NG/ML — LOW (ref 0.8–2)
DIGOXIN SERPL-MCNC: 0.8 NG/ML — SIGNIFICANT CHANGE UP (ref 0.8–2)
EOSINOPHIL # BLD AUTO: 0.17 K/UL — SIGNIFICANT CHANGE UP (ref 0–0.7)
EOSINOPHIL NFR BLD AUTO: 1.5 % — SIGNIFICANT CHANGE UP (ref 0–8)
GLUCOSE SERPL-MCNC: 130 MG/DL — HIGH (ref 70–110)
HCT VFR BLD CALC: 29.4 % — LOW (ref 37–47)
HGB BLD-MCNC: 9.6 G/DL — LOW (ref 14–18)
IMM GRANULOCYTES NFR BLD AUTO: 1.5 % — HIGH (ref 0.1–0.3)
INR BLD: 1.08 RATIO — SIGNIFICANT CHANGE UP (ref 0.65–1.3)
LYMPHOCYTES # BLD AUTO: 1.68 K/UL — SIGNIFICANT CHANGE UP (ref 1.2–3.4)
LYMPHOCYTES # BLD AUTO: 14.5 % — LOW (ref 20.5–51.1)
MAGNESIUM SERPL-MCNC: 1.7 MG/DL — LOW (ref 1.8–2.4)
MCHC RBC-ENTMCNC: 26.8 PG — LOW (ref 27–31)
MCHC RBC-ENTMCNC: 32.7 G/DL — SIGNIFICANT CHANGE UP (ref 32–37)
MCV RBC AUTO: 82.1 FL — SIGNIFICANT CHANGE UP (ref 81–91)
MONOCYTES # BLD AUTO: 1.13 K/UL — HIGH (ref 0.1–0.6)
MONOCYTES NFR BLD AUTO: 9.7 % — HIGH (ref 1.7–9.3)
NEUTROPHILS # BLD AUTO: 8.42 K/UL — HIGH (ref 1.4–6.5)
NEUTROPHILS NFR BLD AUTO: 72.5 % — SIGNIFICANT CHANGE UP (ref 42.2–75.2)
NRBC # BLD: 0 /100 WBCS — SIGNIFICANT CHANGE UP (ref 0–0)
PHOSPHATE SERPL-MCNC: 1.9 MG/DL — LOW (ref 2.1–4.9)
PLATELET # BLD AUTO: 509 K/UL — HIGH (ref 130–400)
POTASSIUM SERPL-MCNC: 3.6 MMOL/L — SIGNIFICANT CHANGE UP (ref 3.5–5)
POTASSIUM SERPL-SCNC: 3.6 MMOL/L — SIGNIFICANT CHANGE UP (ref 3.5–5)
PROTHROM AB SERPL-ACNC: 11.7 SEC — SIGNIFICANT CHANGE UP (ref 9.95–12.87)
RBC # BLD: 3.58 M/UL — LOW (ref 4.2–5.4)
RBC # FLD: 19.9 % — HIGH (ref 11.5–14.5)
SODIUM SERPL-SCNC: 138 MMOL/L — SIGNIFICANT CHANGE UP (ref 135–146)
WBC # BLD: 11.6 K/UL — HIGH (ref 4.8–10.8)
WBC # FLD AUTO: 11.6 K/UL — HIGH (ref 4.8–10.8)

## 2018-02-25 RX ORDER — WARFARIN SODIUM 2.5 MG/1
5 TABLET ORAL ONCE
Qty: 0 | Refills: 0 | Status: COMPLETED | OUTPATIENT
Start: 2018-02-25 | End: 2018-02-25

## 2018-02-25 RX ORDER — POTASSIUM CHLORIDE 20 MEQ
20 PACKET (EA) ORAL ONCE
Qty: 0 | Refills: 0 | Status: COMPLETED | OUTPATIENT
Start: 2018-02-25 | End: 2018-02-25

## 2018-02-25 RX ORDER — MAGNESIUM SULFATE 500 MG/ML
2 VIAL (ML) INJECTION ONCE
Qty: 0 | Refills: 0 | Status: COMPLETED | OUTPATIENT
Start: 2018-02-25 | End: 2018-02-25

## 2018-02-25 RX ORDER — ONDANSETRON 8 MG/1
4 TABLET, FILM COATED ORAL EVERY 6 HOURS
Qty: 0 | Refills: 0 | Status: DISCONTINUED | OUTPATIENT
Start: 2018-02-25 | End: 2018-02-26

## 2018-02-25 RX ADMIN — VALSARTAN 160 MILLIGRAM(S): 80 TABLET ORAL at 06:40

## 2018-02-25 RX ADMIN — Medication 650 MILLIGRAM(S): at 17:37

## 2018-02-25 RX ADMIN — Medication 0.25 MILLIGRAM(S): at 06:42

## 2018-02-25 RX ADMIN — Medication 100 MILLIGRAM(S): at 11:17

## 2018-02-25 RX ADMIN — HEPARIN SODIUM 5000 UNIT(S): 5000 INJECTION INTRAVENOUS; SUBCUTANEOUS at 22:09

## 2018-02-25 RX ADMIN — Medication 50 MILLIGRAM(S): at 22:11

## 2018-02-25 RX ADMIN — MEMANTINE HYDROCHLORIDE 5 MILLIGRAM(S): 10 TABLET ORAL at 17:37

## 2018-02-25 RX ADMIN — SENNA PLUS 1 TABLET(S): 8.6 TABLET ORAL at 11:16

## 2018-02-25 RX ADMIN — Medication 650 MILLIGRAM(S): at 23:48

## 2018-02-25 RX ADMIN — Medication 50 MICROGRAM(S): at 06:41

## 2018-02-25 RX ADMIN — Medication 50 MILLIEQUIVALENT(S): at 14:56

## 2018-02-25 RX ADMIN — ATORVASTATIN CALCIUM 20 MILLIGRAM(S): 80 TABLET, FILM COATED ORAL at 22:11

## 2018-02-25 RX ADMIN — SERTRALINE 100 MILLIGRAM(S): 25 TABLET, FILM COATED ORAL at 11:17

## 2018-02-25 RX ADMIN — LEVETIRACETAM 1000 MILLIGRAM(S): 250 TABLET, FILM COATED ORAL at 06:41

## 2018-02-25 RX ADMIN — Medication 650 MILLIGRAM(S): at 06:42

## 2018-02-25 RX ADMIN — Medication 100 MILLIGRAM(S): at 06:41

## 2018-02-25 RX ADMIN — Medication 81 MILLIGRAM(S): at 11:17

## 2018-02-25 RX ADMIN — Medication 650 MILLIGRAM(S): at 11:18

## 2018-02-25 RX ADMIN — Medication 50 GRAM(S): at 14:56

## 2018-02-25 RX ADMIN — LEVETIRACETAM 1000 MILLIGRAM(S): 250 TABLET, FILM COATED ORAL at 17:37

## 2018-02-25 RX ADMIN — HEPARIN SODIUM 5000 UNIT(S): 5000 INJECTION INTRAVENOUS; SUBCUTANEOUS at 13:30

## 2018-02-25 RX ADMIN — HEPARIN SODIUM 5000 UNIT(S): 5000 INJECTION INTRAVENOUS; SUBCUTANEOUS at 06:41

## 2018-02-25 RX ADMIN — WARFARIN SODIUM 5 MILLIGRAM(S): 2.5 TABLET ORAL at 23:47

## 2018-02-25 RX ADMIN — MEMANTINE HYDROCHLORIDE 5 MILLIGRAM(S): 10 TABLET ORAL at 06:41

## 2018-02-25 RX ADMIN — PANTOPRAZOLE SODIUM 40 MILLIGRAM(S): 20 TABLET, DELAYED RELEASE ORAL at 06:40

## 2018-02-25 RX ADMIN — Medication 100 MILLIGRAM(S): at 17:37

## 2018-02-25 NOTE — PROGRESS NOTE ADULT - ASSESSMENT
ASSESSMENT:  78F admitted with n/v/bloody diarrhea. Found to have high grade SBO. S/P EX LAP, GARDENIA Post-Op Day #:7    PLAN:  - STARTED ON PO DILTIAZEM 90MG Q6, IV CARDIZEM HELD.  - F/U AM PT INR COUMADIN 5MG  - ON DIGOXIN STARTED (F/U LEVEL ON SUNDAY)  - F/U PT/REHAB.    - TAKE OUT STAPLES MONDAY

## 2018-02-25 NOTE — PROGRESS NOTE ADULT - ATTENDING COMMENTS
HR controlled  awaiting rehab eval  family eager to take home as patient already has services
rate control remains a challenge but pt was refusing her PO meds and has taken them fully this morning.  We will reassess in the PM whether there is progress weaning the cardizem.  Otherwise she is passing flatus and bm and can have diet.  She should be able to be transferred to the telemetry floor in the afternoon.
see a/p above
still working on heart rate, digoxin today, still requiring gtt cardine  otherwise tolerating diet, passing flatus ad stool, hb stable  transfer to tele
Assessment and plan above were modified and discussed with residents, physician assistants, and nurses.
still struggling to control HR, but pt is hemodynamically stable, tolerating diet and having bm

## 2018-02-25 NOTE — PROGRESS NOTE ADULT - SUBJECTIVE AND OBJECTIVE BOX
GENERAL SURGERY PROGRESS NOTE    Patient: IRA PACKER , 78y ([birth year])Female   MRN: [id number]  Location: [address]  Visit: 02-18-18 Inpatient  Date: 02-25-18 @ 01:37    Hospital Day #:8  Post-Op Day #:7    Procedure/Dx/Injuries: SBO, S/P EX LAP, GARDENIA    Events of past 24 hours: no acute events o/n    PAST MEDICAL & SURGICAL HISTORY:  Seizures  Dementia  High cholesterol  Hypothyroidism, unspecified type  Hypertension, unspecified type  Diabetes  Cerebrovascular accident (CVA), unspecified mechanism  Atrial fibrillation, unspecified type  H/O abdominal hysterectomy  S/P percutaneous endoscopic gastrostomy (PEG) tube placement: s/p removal of peg tube      Vitals: T(F): 98.5 (02-24-18 @ 23:58), Max: 99.5 (02-24-18 @ 20:32)  HR: 62 (02-24-18 @ 23:58)  BP: 121/62 (02-24-18 @ 23:58)  RR: 18 (02-24-18 @ 23:58)  SpO2: --    Pain (0-10):            Pain Control Adequate: [] YES [] N    Diet, Low Fiber    02-23-18 @ 07:01  -  02-24-18 @ 07:00  --------------------------------------------------------  IN:    diltiazem Infusion: 25 mL    diltiazem Infusion: 250.5 mL    Oral Fluid: 200 mL    sodium chloride 0.9%: 800 mL  Total IN: 1275.5 mL    OUT:  Total OUT: 0 mL    Total NET: 1275.5 mL    Bowel Movement: : [x] YES [] NO  Flatus: : [x] YES [] NO    PHYSICAL EXAM  GEN: NAD  CV/LUNG: CTAB  ABD: SOFT, NON DISTENDED, TENDERNESS @ MIDLINE INCISION SITE, NO REBOUND, NO GUARDING  INCISION: C/D/I    MEDICATIONS  (STANDING):  acetaminophen   Tablet. 650 milliGRAM(s) Oral every 6 hours  aspirin  chewable 81 milliGRAM(s) Oral daily  atorvastatin 20 milliGRAM(s) Oral at bedtime  digoxin     Tablet 0.25 milliGRAM(s) Oral daily  diltiazem    Tablet 90 milliGRAM(s) Oral every 6 hours  diltiazem Infusion 1 mG/Hr (1 mL/Hr) IV Continuous <Continuous>  docusate sodium 100 milliGRAM(s) Oral daily  heparin  Injectable 5000 Unit(s) SubCutaneous every 8 hours  levETIRAcetam 1000 milliGRAM(s) Oral two times a day  levothyroxine 50 MICROGram(s) Oral daily  memantine 5 milliGRAM(s) Oral two times a day  metoprolol     tartrate 100 milliGRAM(s) Oral two times a day  pantoprazole    Tablet 40 milliGRAM(s) Oral before breakfast  senna 1 Tablet(s) Oral daily  sertraline 100 milliGRAM(s) Oral daily  traZODone 50 milliGRAM(s) Oral at bedtime  valsartan 160 milliGRAM(s) Oral daily    DVT PROPHYLAXIS: [x] YES [] NO   GI PROPHYLAXIS: [x] YES [] NO   ANTICOAGULATION: [] YES [x] NO   ANTIBIOTICS: [] YES [x] NO     LAB/STUDIES:  CAPILLARY BLOOD GLUCOSE  157 (24 Feb 2018 11:46)  113 (24 Feb 2018 08:13)               9.6    8.89  )-----------( 497      ( 24 Feb 2018 01:17 )             30.1     02-24    139  |  111<H>  |  9<L>  ----------------------------<  122<H>  3.7   |  16<L>  |  0.8    Ca    8.2<L>      24 Feb 2018 01:17  Phos  2.4     02-24  Mg     1.8     02-24    PT/INR - ( 24 Feb 2018 01:17 )   PT: 10.60 sec;   INR: 0.98 ratio    PTT - ( 24 Feb 2018 01:17 )  PTT:24.9 sec

## 2018-02-26 ENCOUNTER — TRANSCRIPTION ENCOUNTER (OUTPATIENT)
Age: 78
End: 2018-02-26

## 2018-02-26 VITALS
RESPIRATION RATE: 19 BRPM | DIASTOLIC BLOOD PRESSURE: 86 MMHG | TEMPERATURE: 98 F | SYSTOLIC BLOOD PRESSURE: 164 MMHG | HEART RATE: 106 BPM

## 2018-02-26 RX ORDER — DILTIAZEM HCL 120 MG
1 CAPSULE, EXT RELEASE 24 HR ORAL
Qty: 0 | Refills: 0 | COMMUNITY
Start: 2018-02-26

## 2018-02-26 RX ORDER — DIGOXIN 250 MCG
1 TABLET ORAL
Qty: 0 | Refills: 0 | COMMUNITY
Start: 2018-02-26

## 2018-02-26 RX ORDER — ACETAMINOPHEN 500 MG
2 TABLET ORAL
Qty: 0 | Refills: 0 | COMMUNITY
Start: 2018-02-26

## 2018-02-26 RX ORDER — DILTIAZEM HCL 120 MG
1 CAPSULE, EXT RELEASE 24 HR ORAL
Qty: 56 | Refills: 0 | OUTPATIENT
Start: 2018-02-26 | End: 2018-03-11

## 2018-02-26 RX ORDER — CLOPIDOGREL BISULFATE 75 MG/1
75 TABLET, FILM COATED ORAL
Qty: 0 | Refills: 0 | COMMUNITY

## 2018-02-26 RX ORDER — DIGOXIN 250 MCG
1 TABLET ORAL
Qty: 14 | Refills: 0 | OUTPATIENT
Start: 2018-02-26 | End: 2018-03-11

## 2018-02-26 RX ORDER — WARFARIN SODIUM 2.5 MG/1
1 TABLET ORAL
Qty: 7 | Refills: 0 | OUTPATIENT
Start: 2018-02-26 | End: 2018-03-04

## 2018-02-26 RX ADMIN — Medication 0.25 MILLIGRAM(S): at 06:18

## 2018-02-26 RX ADMIN — Medication 100 MILLIGRAM(S): at 06:18

## 2018-02-26 RX ADMIN — VALSARTAN 160 MILLIGRAM(S): 80 TABLET ORAL at 06:19

## 2018-02-26 RX ADMIN — Medication 50 MICROGRAM(S): at 06:18

## 2018-02-26 RX ADMIN — SERTRALINE 100 MILLIGRAM(S): 25 TABLET, FILM COATED ORAL at 12:32

## 2018-02-26 RX ADMIN — PANTOPRAZOLE SODIUM 40 MILLIGRAM(S): 20 TABLET, DELAYED RELEASE ORAL at 06:19

## 2018-02-26 RX ADMIN — LEVETIRACETAM 1000 MILLIGRAM(S): 250 TABLET, FILM COATED ORAL at 06:23

## 2018-02-26 RX ADMIN — MEMANTINE HYDROCHLORIDE 5 MILLIGRAM(S): 10 TABLET ORAL at 06:17

## 2018-02-26 RX ADMIN — HEPARIN SODIUM 5000 UNIT(S): 5000 INJECTION INTRAVENOUS; SUBCUTANEOUS at 06:19

## 2018-02-26 RX ADMIN — Medication 650 MILLIGRAM(S): at 12:31

## 2018-02-26 RX ADMIN — Medication 81 MILLIGRAM(S): at 12:31

## 2018-02-26 RX ADMIN — Medication 650 MILLIGRAM(S): at 13:00

## 2018-02-26 RX ADMIN — Medication 650 MILLIGRAM(S): at 06:17

## 2018-02-26 NOTE — DISCHARGE NOTE ADULT - CARE PROVIDER_API CALL
Shorty Palumbo), Surgery; Surgical Critical Care  [address] [place] [address] [address] [place] [address]  Phone: [phone]  Fax: [phone]

## 2018-02-26 NOTE — DISCHARGE NOTE ADULT - FINDINGS/TREATMENT
Staples removed, covered with steri-strips. Keep area clean and dry. OK to shower, Steri strips will fall off on their own. as above.

## 2018-02-26 NOTE — DISCHARGE NOTE ADULT - PLAN OF CARE
Complete Recovery Continue regular diet  Follow up with Dr. Palumbo as an outpatient in 1 week.   Avoid heavy lifting and straining.   Keep incision clean and dry, leave steri-strips on wound, they will fall off on their own.   Shower/bathe as usual optimization Continue home regimen Optimization Started on Diltiazem 90mg PO q6hrs.   Dr. Espinoza contacted- will manage cardiac conditions and management of tachycardia as an outpatient. Dr. Espinoza to reassess the need for Plavix.   Continue Coumadin as per Dr. Kingston's management.

## 2018-02-26 NOTE — DISCHARGE NOTE ADULT - MEDICATION SUMMARY - MEDICATIONS TO TAKE
I will START or STAY ON the medications listed below when I get home from the hospital:    aspirin 81 mg oral tablet, chewable  -- Indication: For Cerebrovascular accident (CVA), unspecified mechanism    acetaminophen 325 mg oral tablet  -- 2 tab(s) by mouth every 6 hours  -- Indication: For pain    Diovan 160 mg oral tablet  -- 1 tab(s) by mouth once a day  -- Indication: For Hypertension, unspecified type    dilTIAZem 90 mg oral tablet  -- 1 tab(s) by mouth every 6 hours  -- Indication: For Hypertension, unspecified type    digoxin 250 mcg (0.25 mg) oral tablet  -- 1 tab(s) by mouth once a day  -- Indication: For Atrial fibrillation, unspecified type    levETIRAcetam 1000 mg oral tablet  -- 1 tab(s) by mouth 2 times a day  -- Indication: For Seizures    Zoloft 100 mg oral tablet  -- 1 tab(s) by mouth once a day  -- Indication: For Depression/anxiety    traZODone 50 mg oral tablet  -- 1 tab(s) by mouth 3 times a day  -- Indication: For pain    mirtazapine  -- Indication: For Anxiety/depression    Januvia  -- Indication: For Diabetes    Lipitor  -- Indication: For Hyperlipidemia    metoprolol tartrate 50 mg oral tablet  -- 1 tab(s) by mouth 2 times a day  -- Indication: For Atrial fibrillation, unspecified type    Pepcid 20 mg oral tablet  -- 1 tab(s) by mouth 2 times a day  -- Indication: For GERD    Namenda  -- Indication: For Dementia    omeprazole 20 mg oral delayed release tablet  -- 1 tab(s) by mouth once a day  -- Indication: For GERD    levothyroxine 50 mcg (0.05 mg) oral tablet  -- 1 tab(s) by mouth once a day  -- Indication: For Hypothyroidism, unspecified type    Toviaz 8 mg oral tablet, extended release  -- 1 tab(s) by mouth once a day  -- Indication: For OVERACTIVE BLADDER I will START or STAY ON the medications listed below when I get home from the hospital:    aspirin 81 mg oral tablet, chewable  -- Indication: For Cerebrovascular accident (CVA), unspecified mechanism    acetaminophen 325 mg oral tablet  -- 2 tab(s) by mouth every 6 hours  -- Indication: For pain    Diovan 160 mg oral tablet  -- 1 tab(s) by mouth once a day  -- Indication: For Hypertension, unspecified type    dilTIAZem 90 mg oral tablet  -- 1 tab(s) by mouth every 6 hours  -- Indication: For Hypertension, unspecified type    digoxin 250 mcg (0.25 mg) oral tablet  -- 1 tab(s) by mouth once a day  -- Indication: For Atrial fibrillation, unspecified type    dilTIAZem 90 mg oral tablet  -- 1 tab(s) by mouth every 6 hours  -- Indication: For Atrial fibrillation, unspecified type    digoxin 250 mcg (0.25 mg) oral tablet  -- 1 tab(s) by mouth once a day  -- Indication: For Atrial fibrillation, unspecified type    Coumadin 5 mg oral tablet  -- 1 tab(s) by mouth once a day   -- Do not take this drug if you are pregnant.  It is very important that you take or use this exactly as directed.  Do not skip doses or discontinue unless directed by your doctor.  Obtain medical advice before taking any non-prescription drugs as some may affect the action of this medication.    -- Indication: For Cerebrovascular accident (CVA), unspecified mechanism    levETIRAcetam 1000 mg oral tablet  -- 1 tab(s) by mouth 2 times a day  -- Indication: For Seizures    Zoloft 100 mg oral tablet  -- 1 tab(s) by mouth once a day  -- Indication: For Depression/anxiety    traZODone 50 mg oral tablet  -- 1 tab(s) by mouth 3 times a day  -- Indication: For pain    mirtazapine  -- Indication: For Anxiety/depression    Januvia  -- Indication: For Diabetes    Lipitor  -- Indication: For Hyperlipidemia    metoprolol tartrate 50 mg oral tablet  -- 1 tab(s) by mouth 2 times a day  -- Indication: For Atrial fibrillation, unspecified type    Pepcid 20 mg oral tablet  -- 1 tab(s) by mouth 2 times a day  -- Indication: For GERD    Namenda  -- Indication: For Dementia    omeprazole 20 mg oral delayed release tablet  -- 1 tab(s) by mouth once a day  -- Indication: For GERD    levothyroxine 50 mcg (0.05 mg) oral tablet  -- 1 tab(s) by mouth once a day  -- Indication: For Hypothyroidism, unspecified type    Toviaz 8 mg oral tablet, extended release  -- 1 tab(s) by mouth once a day  -- Indication: For OVERACTIVE BLADDER

## 2018-02-26 NOTE — PROGRESS NOTE ADULT - SUBJECTIVE AND OBJECTIVE BOX
78y Female PAST MEDICAL & SURGICAL HISTORY:  Seizures  Dementia  High cholesterol  Hypothyroidism, unspecified type  Hypertension, unspecified type  Diabetes  Cerebrovascular accident (CVA), unspecified mechanism  Atrial fibrillation, unspecified type  H/O abdominal hysterectomy  S/P percutaneous endoscopic gastrostomy (PEG) tube placement: s/p removal of peg tube      Hospital Day: 9d  Post Operative Day: 7    Procedure: exlap with dejuan for sbo    Events of the Last 24h: patient switched from iv to po diltiazem, had brief episodes of tachycardia but primarily resolved. mild abdominal pain but has been passing gas and having bm. pain has mostly resolved    Vital Signs Last 24 Hrs  T(C): 37.2 (2018 21:24), Max: 37.2 (2018 21:24)  T(F): 98.9 (2018 21:24), Max: 98.9 (2018 21:24)  HR: 77 (2018 23:45) (77 - 125)  BP: 137/72 (2018 23:45) (137/72 - 181/85)  BP(mean): --  RR: 20 (2018 21:24) (18 - 20)  SpO2: --      I&O's Detail    2018 07:01  -  2018 07:00  --------------------------------------------------------  IN:    diltiazem Infusion: 120 mL    Oral Fluid: 400 mL  Total IN: 520 mL    OUT:  Total OUT: 0 mL    Total NET: 520 mL          PHYSICAL EXAM:    GENERAL: NAD    HEENT: NCAT    CHEST/LUNGS: CTAB    HEART: RRR,  No murmurs, rubs, or gallops    ABDOMEN: Mild perincisional tenderness     EXTREMITIES:  FROM, No clubbing, cyanosis, or edema, palpable pulse    NEURO: No focal neurological deficits    SKIN: No rashes or lesions    INCISION/WOUNDS:    Diet, Low Fiber (18 @ 09:45)    MEDICATIONS  (STANDING):  acetaminophen   Tablet. 650 milliGRAM(s) Oral every 6 hours  aspirin  chewable 81 milliGRAM(s) Oral daily  atorvastatin 20 milliGRAM(s) Oral at bedtime  digoxin     Tablet 0.25 milliGRAM(s) Oral daily  diltiazem    Tablet 90 milliGRAM(s) Oral every 6 hours  docusate sodium 100 milliGRAM(s) Oral daily  heparin  Injectable 5000 Unit(s) SubCutaneous every 8 hours  levETIRAcetam 1000 milliGRAM(s) Oral two times a day  levothyroxine 50 MICROGram(s) Oral daily  memantine 5 milliGRAM(s) Oral two times a day  metoprolol     tartrate 100 milliGRAM(s) Oral two times a day  pantoprazole    Tablet 40 milliGRAM(s) Oral before breakfast  senna 1 Tablet(s) Oral daily  sertraline 100 milliGRAM(s) Oral daily  traZODone 50 milliGRAM(s) Oral at bedtime  valsartan 160 milliGRAM(s) Oral daily    MEDICATIONS  (PRN):  ondansetron   Disintegrating Tablet 4 milliGRAM(s) Oral every 6 hours PRN Nausea and/or Vomiting                              9.6    11.60 )-----------( 509      ( 2018 09:50 )             29.4                       138   |  111   |  9                  Ca: 8.3    BMP:   ----------------------------< 130    M.7   (18 @ 09:50)             3.6    |  20    | 0.6                Ph: 1.9      LFT:     TPro: 6.9 / Alb: 3.8 / TBili: 1.4 / DBili: x / AST: 45 / ALT: 22 / AlkPhos: 116   (18 @ 07:59)    LFTs:    Coags:     11.70  ----< 1.08    ( 2018 09:50 )     x             SPECTRA: [id number]

## 2018-02-26 NOTE — PROGRESS NOTE ADULT - ASSESSMENT
patient continues to improve, restarted on home coumadin. f/u with sw for possible discharge planning as patient is clinically stable

## 2018-02-26 NOTE — DISCHARGE NOTE ADULT - PATIENT PORTAL LINK FT
You can access the [id number] Patient Portal, offered by [facility], by registering with the following website: [email or web address]

## 2018-02-26 NOTE — DISCHARGE NOTE ADULT - CARE PLAN
Principal Discharge DX:	Small bowel obstruction  Goal:	Complete Recovery  Assessment and plan of treatment:	Continue regular diet  Follow up with Dr. Palumbo as an outpatient in 1 week.   Avoid heavy lifting and straining.   Keep incision clean and dry, leave steri-strips on wound, they will fall off on their own.   Shower/bathe as usual  Secondary Diagnosis:	Diabetes  Goal:	optimization  Assessment and plan of treatment:	Continue home regimen  Secondary Diagnosis:	Atrial fibrillation, unspecified type  Goal:	Optimization  Assessment and plan of treatment:	Started on Diltiazem 90mg PO q6hrs.   Dr. Espinoza contacted- will manage cardiac conditions and management of tachycardia as an outpatient.  Secondary Diagnosis:	Cerebrovascular accident (CVA), unspecified mechanism  Goal:	Optimization  Assessment and plan of treatment:	Dr. Espinoza to reassess the need for Plavix.   Continue Coumadin as per Dr. Kingston's management.

## 2018-02-26 NOTE — CHART NOTE - NSCHARTNOTEFT_GEN_A_CORE
Registered Dietitian Follow-Up     Pertinent Subjective Information: Spoke w/ pt and family member at bedside. Report pt has adequate elaina/PO intake, but only eating food brought in from home. Denies any GI distress. Educated pt and fam member to choose GI soft foods and advance to regular foods as tolerated.      Pertinent Medical Interventions: s/p ex lap w/ GARDENIA for SBO POD #7.      Diet order: Low fiber     Anthropometrics:  54.3 kg 2/26, fluctuating weights, most recent trending down since admission (not on diuretics, will monitor). Will continue to base needs on bedscale wt (61.8 kg) from previous assessment     Pertinent Lab Data: 2/25: Glucose 130, BUN 9, Cr 0.6, Na 138, K 3.6     Pertinent Meds: warfarin, heparin, aspirin, k chloride, digoxin, metoprolol, levothyroxine    Physical Findings:  - Appearance: alert  - GI function: passing gas, having BMs, denies GI dsitress  - Tubes:  - Oral/Mouth cavity: no problems noted  - Skin: no pressure ulcer noted     Nutrition Requirements       Estimated Protein Needs    Continue []  Adjust []  Adjusted Protein Recommendations:   gm/day        Estimated Fluid Needs        Continue []  Adjust []  Adjusted Fluid Recommendations:   mL/day     Nutrient Intake: based on previous bedscale wt of 61.8kg    Other Calculation	Energy: 9613-2381 kcal/day (MSJx1.2-1.3 AF)    Protein: 61-73 g/day (1-1.2 g/kg ABW)    Fluids: 1ml/kcal or per LIP    [] Previous Nutrition Diagnosis: Inadequate protein energy intake (resolved)    [] No active nutrition diagnosis identified at this time     Nutrition Intervention: Meals and snacks     Goal/Expected Outcome: PO >75% over next 7 days. Pt to toleraate diet over next 7 days     Indicator/Monitoring: RD to monitor energy intake, body comp, nutrition focused physical findings (BMS)

## 2018-02-26 NOTE — DISCHARGE NOTE ADULT - SECONDARY DIAGNOSIS.
Diabetes Atrial fibrillation, unspecified type Cerebrovascular accident (CVA), unspecified mechanism

## 2018-02-26 NOTE — DISCHARGE NOTE ADULT - HOSPITAL COURSE
77yo F presents with 1 days h/o abdominal pain and vomiting on 2/18/18. No bowel function. H/o hysterectomy and PEG placement s/p stroke in 2016. Currently on ASA/Plavix/Coumadin. No chest pain, shortness of breath. Bloody diarrhea 2 days ago. afebrile. Admitted for management of SBO, taken to the OR 2/18/18 for Ex Lap, GARDENIA, no bowel resection required. Post-operatively the patient went into Afib with RVR and was admitted to the ICU for cardiac monitoring. Extubated post-op, Hg was 6.9 and was trasfused 2 PRBC. Then went into Afib with RVR, was given Metoprolol pushes then started on Cardizem gtt. Roberts scanned today- negative for PE, found to have a possible hematoma (18 Feb 2018 18:45). Patient downgraded to telemetry 2/22/18, cardio cleared pt to resume coumadin, rate controlled with diltiazem drip.   2/24 : po diltiazem started 90mg po q6, diltiazem drip weaned and d/c'd. pt was started on a regular diet, had episode of abdominal pain, but tolerated diet. pains were due to gas.  2/25 : Pt's heart rate is controlled except for 1 episode of tachycardia up to the 120's. Cleared for discharge per surgery, will have PCP manage the patient's Plavix, Coumadin and diltiazem as an outpatient.   2/26 : pt is cleared for discharge          Went into Afib with RVR post op, hemodynamic monitoring, s/p SBO,  Ex-lap, lysed band and bowel pinked up, no RSX done

## 2018-02-28 DIAGNOSIS — E11.9 TYPE 2 DIABETES MELLITUS WITHOUT COMPLICATIONS: ICD-10-CM

## 2018-02-28 DIAGNOSIS — I69.354 HEMIPLEGIA AND HEMIPARESIS FOLLOWING CEREBRAL INFARCTION AFFECTING LEFT NON-DOMINANT SIDE: ICD-10-CM

## 2018-02-28 DIAGNOSIS — Z79.01 LONG TERM (CURRENT) USE OF ANTICOAGULANTS: ICD-10-CM

## 2018-02-28 DIAGNOSIS — R00.0 TACHYCARDIA, UNSPECIFIED: ICD-10-CM

## 2018-02-28 DIAGNOSIS — I10 ESSENTIAL (PRIMARY) HYPERTENSION: ICD-10-CM

## 2018-02-28 DIAGNOSIS — R94.31 ABNORMAL ELECTROCARDIOGRAM [ECG] [EKG]: ICD-10-CM

## 2018-02-28 DIAGNOSIS — Z79.84 LONG TERM (CURRENT) USE OF ORAL HYPOGLYCEMIC DRUGS: ICD-10-CM

## 2018-02-28 DIAGNOSIS — R06.89 OTHER ABNORMALITIES OF BREATHING: ICD-10-CM

## 2018-02-28 DIAGNOSIS — D62 ACUTE POSTHEMORRHAGIC ANEMIA: ICD-10-CM

## 2018-02-28 DIAGNOSIS — K56.52 INTESTINAL ADHESIONS [BANDS] WITH COMPLETE OBSTRUCTION: ICD-10-CM
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
## 2018-02-28 DIAGNOSIS — E78.5 HYPERLIPIDEMIA, UNSPECIFIED: ICD-10-CM

## 2018-02-28 DIAGNOSIS — I48.0 PAROXYSMAL ATRIAL FIBRILLATION: ICD-10-CM

## 2018-02-28 DIAGNOSIS — E03.9 HYPOTHYROIDISM, UNSPECIFIED: ICD-10-CM

## 2018-02-28 DIAGNOSIS — Z66 DO NOT RESUSCITATE: ICD-10-CM

## 2018-02-28 DIAGNOSIS — E86.0 DEHYDRATION: ICD-10-CM

## 2018-02-28 DIAGNOSIS — I97.89 OTHER POSTPROCEDURAL COMPLICATIONS AND DISORDERS OF THE CIRCULATORY SYSTEM, NOT ELSEWHERE CLASSIFIED: ICD-10-CM

## 2018-02-28 DIAGNOSIS — F03.90 UNSPECIFIED DEMENTIA WITHOUT BEHAVIORAL DISTURBANCE: ICD-10-CM

## 2018-02-28 DIAGNOSIS — Z90.710 ACQUIRED ABSENCE OF BOTH CERVIX AND UTERUS: ICD-10-CM

## 2018-02-28 DIAGNOSIS — E87.6 HYPOKALEMIA: ICD-10-CM

## 2018-02-28 DIAGNOSIS — I48.1 PERSISTENT ATRIAL FIBRILLATION: ICD-10-CM

## 2018-02-28 DIAGNOSIS — D72.829 ELEVATED WHITE BLOOD CELL COUNT, UNSPECIFIED: ICD-10-CM

## 2018-02-28 DIAGNOSIS — R32 UNSPECIFIED URINARY INCONTINENCE: ICD-10-CM

## 2018-03-01 ENCOUNTER — INPATIENT (INPATIENT)
Facility: HOSPITAL | Age: 78
LOS: 11 days | Discharge: HOME HEALTH | End: 2018-03-13
Attending: INTERNAL MEDICINE

## 2018-03-01 VITALS
SYSTOLIC BLOOD PRESSURE: 132 MMHG | DIASTOLIC BLOOD PRESSURE: 80 MMHG | HEART RATE: 98 BPM | OXYGEN SATURATION: 98 % | TEMPERATURE: 99 F | RESPIRATION RATE: 20 BRPM

## 2018-03-01 DIAGNOSIS — Z98.890 OTHER SPECIFIED POSTPROCEDURAL STATES: Chronic | ICD-10-CM

## 2018-03-01 DIAGNOSIS — Z93.1 GASTROSTOMY STATUS: Chronic | ICD-10-CM

## 2018-03-01 RX ORDER — DIATRIZOATE MEGLUMINE 180 MG/ML
20 INJECTION, SOLUTION INTRAVESICAL ONCE
Qty: 0 | Refills: 0 | Status: COMPLETED | OUTPATIENT
Start: 2018-03-01 | End: 2018-03-01

## 2018-03-01 RX ADMIN — DIATRIZOATE MEGLUMINE 20 MILLILITER(S): 180 INJECTION, SOLUTION INTRAVESICAL at 22:15

## 2018-03-01 NOTE — ED PROVIDER NOTE - OBJECTIVE STATEMENT
pt c/o ad pain and distension, recent sbo, ex lap with GARDENIA. last BM yest. no n, v. no fever or chills.

## 2018-03-02 PROBLEM — F03.90 UNSPECIFIED DEMENTIA WITHOUT BEHAVIORAL DISTURBANCE: Chronic | Status: ACTIVE | Noted: 2018-02-18

## 2018-03-02 PROBLEM — E03.9 HYPOTHYROIDISM, UNSPECIFIED: Chronic | Status: ACTIVE | Noted: 2018-02-18

## 2018-03-02 PROBLEM — I10 ESSENTIAL (PRIMARY) HYPERTENSION: Chronic | Status: ACTIVE | Noted: 2018-02-18

## 2018-03-02 PROBLEM — I48.91 UNSPECIFIED ATRIAL FIBRILLATION: Chronic | Status: ACTIVE | Noted: 2018-02-18

## 2018-03-02 PROBLEM — E78.00 PURE HYPERCHOLESTEROLEMIA, UNSPECIFIED: Chronic | Status: ACTIVE | Noted: 2018-02-18

## 2018-03-02 PROBLEM — E11.9 TYPE 2 DIABETES MELLITUS WITHOUT COMPLICATIONS: Chronic | Status: ACTIVE | Noted: 2018-02-18

## 2018-03-02 PROBLEM — R56.9 UNSPECIFIED CONVULSIONS: Chronic | Status: ACTIVE | Noted: 2018-02-18

## 2018-03-02 LAB
ANION GAP SERPL CALC-SCNC: 11 MMOL/L — SIGNIFICANT CHANGE UP (ref 7–14)
ANION GAP SERPL CALC-SCNC: 11 MMOL/L — SIGNIFICANT CHANGE UP (ref 7–14)
APTT BLD: 33.7 SEC — SIGNIFICANT CHANGE UP (ref 27–39.2)
APTT BLD: 36.4 SEC — SIGNIFICANT CHANGE UP (ref 27–39.2)
BASOPHILS # BLD AUTO: 0.02 K/UL — SIGNIFICANT CHANGE UP (ref 0–0.2)
BASOPHILS # BLD AUTO: 0.02 K/UL — SIGNIFICANT CHANGE UP (ref 0–0.2)
BASOPHILS NFR BLD AUTO: 0.1 % — SIGNIFICANT CHANGE UP (ref 0–1)
BASOPHILS NFR BLD AUTO: 0.1 % — SIGNIFICANT CHANGE UP (ref 0–1)
BLD GP AB SCN SERPL QL: SIGNIFICANT CHANGE UP
BUN SERPL-MCNC: 6 MG/DL — LOW (ref 10–20)
BUN SERPL-MCNC: 7 MG/DL — LOW (ref 10–20)
C DIFF BY PCR RESULT: POSITIVE
C DIFF TOX GENS STL QL NAA+PROBE: SIGNIFICANT CHANGE UP
CALCIUM SERPL-MCNC: 7.7 MG/DL — LOW (ref 8.5–10.1)
CALCIUM SERPL-MCNC: 8.1 MG/DL — LOW (ref 8.5–10.1)
CHLORIDE SERPL-SCNC: 100 MMOL/L — SIGNIFICANT CHANGE UP (ref 98–110)
CHLORIDE SERPL-SCNC: 104 MMOL/L — SIGNIFICANT CHANGE UP (ref 98–110)
CK MB CFR SERPL CALC: 1.8 NG/ML — SIGNIFICANT CHANGE UP (ref 0.6–6.3)
CO2 SERPL-SCNC: 22 MMOL/L — SIGNIFICANT CHANGE UP (ref 17–32)
CO2 SERPL-SCNC: 27 MMOL/L — SIGNIFICANT CHANGE UP (ref 17–32)
CREAT SERPL-MCNC: 0.7 MG/DL — SIGNIFICANT CHANGE UP (ref 0.7–1.5)
CREAT SERPL-MCNC: 0.8 MG/DL — SIGNIFICANT CHANGE UP (ref 0.7–1.5)
DIGOXIN SERPL-MCNC: <0.2 NG/ML — LOW (ref 0.8–2)
EOSINOPHIL # BLD AUTO: 0.14 K/UL — SIGNIFICANT CHANGE UP (ref 0–0.7)
EOSINOPHIL # BLD AUTO: 0.18 K/UL — SIGNIFICANT CHANGE UP (ref 0–0.7)
EOSINOPHIL NFR BLD AUTO: 0.9 % — SIGNIFICANT CHANGE UP (ref 0–8)
EOSINOPHIL NFR BLD AUTO: 1.2 % — SIGNIFICANT CHANGE UP (ref 0–8)
GLUCOSE SERPL-MCNC: 103 MG/DL — SIGNIFICANT CHANGE UP (ref 70–110)
GLUCOSE SERPL-MCNC: 86 MG/DL — SIGNIFICANT CHANGE UP (ref 70–110)
HCT VFR BLD CALC: 28.1 % — LOW (ref 37–47)
HCT VFR BLD CALC: 29 % — LOW (ref 37–47)
HGB BLD-MCNC: 9.4 G/DL — LOW (ref 12–16)
HGB BLD-MCNC: 9.7 G/DL — LOW (ref 12–16)
IMM GRANULOCYTES NFR BLD AUTO: 0.8 % — HIGH (ref 0.1–0.3)
IMM GRANULOCYTES NFR BLD AUTO: 0.8 % — HIGH (ref 0.1–0.3)
INR BLD: 2.69 RATIO — HIGH (ref 0.65–1.3)
INR BLD: 2.74 RATIO — HIGH (ref 0.65–1.3)
LACTATE SERPL-SCNC: 0.8 MMOL/L — SIGNIFICANT CHANGE UP (ref 0.5–2.2)
LACTATE SERPL-SCNC: 0.9 MMOL/L — SIGNIFICANT CHANGE UP (ref 0.5–2.2)
LIDOCAIN IGE QN: <10 U/L — LOW (ref 7–60)
LYMPHOCYTES # BLD AUTO: 1.3 K/UL — SIGNIFICANT CHANGE UP (ref 1.2–3.4)
LYMPHOCYTES # BLD AUTO: 1.41 K/UL — SIGNIFICANT CHANGE UP (ref 1.2–3.4)
LYMPHOCYTES # BLD AUTO: 8.5 % — LOW (ref 20.5–51.1)
LYMPHOCYTES # BLD AUTO: 9 % — LOW (ref 20.5–51.1)
MCHC RBC-ENTMCNC: 27.2 PG — SIGNIFICANT CHANGE UP (ref 27–31)
MCHC RBC-ENTMCNC: 27.5 PG — SIGNIFICANT CHANGE UP (ref 27–31)
MCHC RBC-ENTMCNC: 33.4 G/DL — SIGNIFICANT CHANGE UP (ref 32–37)
MCHC RBC-ENTMCNC: 33.5 G/DL — SIGNIFICANT CHANGE UP (ref 32–37)
MCV RBC AUTO: 81.5 FL — SIGNIFICANT CHANGE UP (ref 81–99)
MCV RBC AUTO: 82.2 FL — SIGNIFICANT CHANGE UP (ref 81–99)
MONOCYTES # BLD AUTO: 1.34 K/UL — HIGH (ref 0.1–0.6)
MONOCYTES # BLD AUTO: 1.43 K/UL — HIGH (ref 0.1–0.6)
MONOCYTES NFR BLD AUTO: 8.6 % — SIGNIFICANT CHANGE UP (ref 1.7–9.3)
MONOCYTES NFR BLD AUTO: 9.3 % — SIGNIFICANT CHANGE UP (ref 1.7–9.3)
NEUTROPHILS # BLD AUTO: 12.35 K/UL — HIGH (ref 1.4–6.5)
NEUTROPHILS # BLD AUTO: 12.52 K/UL — HIGH (ref 1.4–6.5)
NEUTROPHILS NFR BLD AUTO: 80.3 % — HIGH (ref 42.2–75.2)
NEUTROPHILS NFR BLD AUTO: 80.4 % — HIGH (ref 42.2–75.2)
NRBC # BLD: 0 /100 WBCS — SIGNIFICANT CHANGE UP (ref 0–0)
PLATELET # BLD AUTO: 723 K/UL — HIGH (ref 130–400)
PLATELET # BLD AUTO: 779 K/UL — HIGH (ref 130–400)
POTASSIUM SERPL-MCNC: 2.8 MMOL/L — LOW (ref 3.5–5)
POTASSIUM SERPL-MCNC: 3.4 MMOL/L — LOW (ref 3.5–5)
POTASSIUM SERPL-SCNC: 2.8 MMOL/L — LOW (ref 3.5–5)
POTASSIUM SERPL-SCNC: 3.4 MMOL/L — LOW (ref 3.5–5)
PROTHROM AB SERPL-ACNC: 29.6 SEC — HIGH (ref 9.95–12.87)
PROTHROM AB SERPL-ACNC: 30.2 SEC — HIGH (ref 9.95–12.87)
RBC # BLD: 3.42 M/UL — LOW (ref 4.2–5.4)
RBC # BLD: 3.56 M/UL — LOW (ref 4.2–5.4)
RBC # FLD: 22.6 % — HIGH (ref 11.5–14.5)
RBC # FLD: 23.2 % — HIGH (ref 11.5–14.5)
SODIUM SERPL-SCNC: 137 MMOL/L — SIGNIFICANT CHANGE UP (ref 135–146)
SODIUM SERPL-SCNC: 138 MMOL/L — SIGNIFICANT CHANGE UP (ref 135–146)
TROPONIN I SERPL-MCNC: 0.06 NG/ML — HIGH (ref 0–0.05)
TYPE + AB SCN PNL BLD: SIGNIFICANT CHANGE UP
WBC # BLD: 15.36 K/UL — HIGH (ref 4.8–10.8)
WBC # BLD: 15.59 K/UL — HIGH (ref 4.8–10.8)
WBC # FLD AUTO: 15.36 K/UL — HIGH (ref 4.8–10.8)
WBC # FLD AUTO: 15.59 K/UL — HIGH (ref 4.8–10.8)

## 2018-03-02 RX ORDER — SODIUM CHLORIDE 9 MG/ML
1000 INJECTION, SOLUTION INTRAVENOUS
Qty: 0 | Refills: 0 | Status: DISCONTINUED | OUTPATIENT
Start: 2018-03-02 | End: 2018-03-03

## 2018-03-02 RX ORDER — DIGOXIN 250 MCG
0.25 TABLET ORAL DAILY
Qty: 0 | Refills: 0 | Status: DISCONTINUED | OUTPATIENT
Start: 2018-03-02 | End: 2018-03-11

## 2018-03-02 RX ORDER — METOPROLOL TARTRATE 50 MG
50 TABLET ORAL
Qty: 0 | Refills: 0 | Status: DISCONTINUED | OUTPATIENT
Start: 2018-03-02 | End: 2018-03-11

## 2018-03-02 RX ORDER — ATORVASTATIN CALCIUM 80 MG/1
10 TABLET, FILM COATED ORAL AT BEDTIME
Qty: 0 | Refills: 0 | Status: DISCONTINUED | OUTPATIENT
Start: 2018-03-02 | End: 2018-03-13

## 2018-03-02 RX ORDER — PANTOPRAZOLE SODIUM 20 MG/1
40 TABLET, DELAYED RELEASE ORAL DAILY
Qty: 0 | Refills: 0 | Status: DISCONTINUED | OUTPATIENT
Start: 2018-03-02 | End: 2018-03-13

## 2018-03-02 RX ORDER — LEVOTHYROXINE SODIUM 125 MCG
25 TABLET ORAL AT BEDTIME
Qty: 0 | Refills: 0 | Status: DISCONTINUED | OUTPATIENT
Start: 2018-03-02 | End: 2018-03-08

## 2018-03-02 RX ORDER — POTASSIUM CHLORIDE 20 MEQ
20 PACKET (EA) ORAL
Qty: 0 | Refills: 0 | Status: COMPLETED | OUTPATIENT
Start: 2018-03-02 | End: 2018-03-02

## 2018-03-02 RX ORDER — LEVETIRACETAM 250 MG/1
1000 TABLET, FILM COATED ORAL EVERY 12 HOURS
Qty: 0 | Refills: 0 | Status: DISCONTINUED | OUTPATIENT
Start: 2018-03-02 | End: 2018-03-12

## 2018-03-02 RX ORDER — SERTRALINE 25 MG/1
100 TABLET, FILM COATED ORAL DAILY
Qty: 0 | Refills: 0 | Status: DISCONTINUED | OUTPATIENT
Start: 2018-03-02 | End: 2018-03-11

## 2018-03-02 RX ORDER — HEPARIN SODIUM 5000 [USP'U]/ML
5000 INJECTION INTRAVENOUS; SUBCUTANEOUS EVERY 8 HOURS
Qty: 0 | Refills: 0 | Status: DISCONTINUED | OUTPATIENT
Start: 2018-03-02 | End: 2018-03-13

## 2018-03-02 RX ORDER — VANCOMYCIN HCL 1 G
125 VIAL (EA) INTRAVENOUS EVERY 6 HOURS
Qty: 0 | Refills: 0 | Status: DISCONTINUED | OUTPATIENT
Start: 2018-03-02 | End: 2018-03-12

## 2018-03-02 RX ADMIN — LEVETIRACETAM 400 MILLIGRAM(S): 250 TABLET, FILM COATED ORAL at 06:22

## 2018-03-02 RX ADMIN — Medication 50 MILLIEQUIVALENT(S): at 06:22

## 2018-03-02 RX ADMIN — ATORVASTATIN CALCIUM 10 MILLIGRAM(S): 80 TABLET, FILM COATED ORAL at 23:11

## 2018-03-02 RX ADMIN — Medication 50 MILLIGRAM(S): at 18:35

## 2018-03-02 RX ADMIN — Medication 50 MILLIGRAM(S): at 06:22

## 2018-03-02 RX ADMIN — HEPARIN SODIUM 5000 UNIT(S): 5000 INJECTION INTRAVENOUS; SUBCUTANEOUS at 06:27

## 2018-03-02 RX ADMIN — Medication 50 MILLIEQUIVALENT(S): at 08:35

## 2018-03-02 RX ADMIN — Medication 0.25 MILLIGRAM(S): at 06:24

## 2018-03-02 RX ADMIN — SODIUM CHLORIDE 75 MILLILITER(S): 9 INJECTION, SOLUTION INTRAVENOUS at 10:10

## 2018-03-02 RX ADMIN — HEPARIN SODIUM 5000 UNIT(S): 5000 INJECTION INTRAVENOUS; SUBCUTANEOUS at 23:12

## 2018-03-02 RX ADMIN — SERTRALINE 100 MILLIGRAM(S): 25 TABLET, FILM COATED ORAL at 12:52

## 2018-03-02 RX ADMIN — LEVETIRACETAM 400 MILLIGRAM(S): 250 TABLET, FILM COATED ORAL at 18:35

## 2018-03-02 RX ADMIN — Medication 50 MILLIEQUIVALENT(S): at 10:10

## 2018-03-02 RX ADMIN — Medication 50 MILLIEQUIVALENT(S): at 17:40

## 2018-03-02 RX ADMIN — SODIUM CHLORIDE 75 MILLILITER(S): 9 INJECTION, SOLUTION INTRAVENOUS at 07:00

## 2018-03-02 RX ADMIN — Medication 50 MILLIEQUIVALENT(S): at 21:15

## 2018-03-02 RX ADMIN — HEPARIN SODIUM 5000 UNIT(S): 5000 INJECTION INTRAVENOUS; SUBCUTANEOUS at 14:50

## 2018-03-02 RX ADMIN — PANTOPRAZOLE SODIUM 40 MILLIGRAM(S): 20 TABLET, DELAYED RELEASE ORAL at 12:52

## 2018-03-02 NOTE — H&P ADULT - ASSESSMENT
79 yo F with multiple comorbidities, s/p recent ex lap and GARDENIA for SBO, i/a hematoma.  - recurrent pSBO  - new moderate to large b/l pleural effusions    Plan  NPO/NG  IVF hydration  Stool cx and cdif  Labs in am  Nonoperative mngt  Discussed with Dr. Herbert

## 2018-03-02 NOTE — ED ADULT NURSE NOTE - OBJECTIVE STATEMENT
Pt AOx2, from home, BIB daughter c/o abdominal pain and increase distention. h/o CVA, left sided residual noted. Pt denies n/v/f. s/p SBO, and ex lap.  Pt denies hematuria, dysuria.

## 2018-03-02 NOTE — H&P ADULT - NSHPLABSRESULTS_GEN_ALL_CORE
9.7    15.59 )-----------( 779      ( 01 Mar 2018 23:25 )             29.0   03-01    138  |  100  |  6<L>  ----------------------------<  103  2.8<L>   |  27  |  0.7    Ca    8.1<L>      01 Mar 2018 23:25    < from: CT Abdomen and Pelvis w/ Oral Cont and w/ IV Cont (03.02.18 @ 01:05) >    IMPRESSION:  Since 2/20/2018:    Similar to slightly increased dilatation of small bowel loops consistent   with recurrent or residual small bowel obstruction..    Stable hematoma in the right lower quadrant small bowel mesentery   measuring 7.1 x 4.7 cm.    Increasing bilateral pleural effusions.    < end of copied text >

## 2018-03-02 NOTE — ED ADULT NURSE NOTE - CHIEF COMPLAINT QUOTE
c/o abdominal pain and distention. s/p bowel resection 2 weeks ago. Hx left hemiparesis from previous stroke

## 2018-03-02 NOTE — H&P ADULT - HISTORY OF PRESENT ILLNESS
77 yo F w/ PMH/PSH: afib on coumadin, CVA, dementia, DM, HLD, seizure, hypothyroidism. S/p recent ex lap, GARDENIA for SBO 2 weeks ago with complication of i/a mesenteric hematoma (stable). Was doing well after surgery and discharged on Feb 26 home. 1 day ago started to have diarhea, nonbloody, no fever, no n/v, but abdominal distention. Today complained on abdominal pain. Came with stable vitals, no fever, abdominal distention.    PMH/PSH: PMH/PSH: afib on coumadin, CVA, dementia, DM, HLD, seizure, hypothyroidism. S/p recent ex lap, GARDENIA for SBO 2 weeks ago with complication of i/a mesenteric hematoma (stable).

## 2018-03-02 NOTE — H&P ADULT - NSHPPHYSICALEXAM_GEN_ALL_CORE
Gen: a&ox1 (baseline)  Lungs: coarse lung sounds b/l  Abd: soft, distended, minimal tenderness, no peritoneal signs, rectal exam - small amount of green stool

## 2018-03-03 LAB
ANION GAP SERPL CALC-SCNC: 16 MMOL/L — HIGH (ref 7–14)
APTT BLD: 36.3 SEC — SIGNIFICANT CHANGE UP (ref 27–39.2)
APTT BLD: 37.7 SEC — SIGNIFICANT CHANGE UP (ref 27–39.2)
BASOPHILS # BLD AUTO: 0.04 K/UL — SIGNIFICANT CHANGE UP (ref 0–0.2)
BASOPHILS # BLD AUTO: 0.05 K/UL — SIGNIFICANT CHANGE UP (ref 0–0.2)
BASOPHILS NFR BLD AUTO: 0.2 % — SIGNIFICANT CHANGE UP (ref 0–1)
BASOPHILS NFR BLD AUTO: 0.3 % — SIGNIFICANT CHANGE UP (ref 0–1)
BUN SERPL-MCNC: 5 MG/DL — LOW (ref 10–20)
CALCIUM SERPL-MCNC: 8 MG/DL — LOW (ref 8.5–10.1)
CHLORIDE SERPL-SCNC: 105 MMOL/L — SIGNIFICANT CHANGE UP (ref 98–110)
CK SERPL-CCNC: 208 U/L — SIGNIFICANT CHANGE UP (ref 0–225)
CO2 SERPL-SCNC: 19 MMOL/L — SIGNIFICANT CHANGE UP (ref 17–32)
CREAT SERPL-MCNC: 0.8 MG/DL — SIGNIFICANT CHANGE UP (ref 0.7–1.5)
DIGOXIN SERPL-MCNC: 0.6 NG/ML — LOW (ref 0.8–2)
EOSINOPHIL # BLD AUTO: 0.31 K/UL — SIGNIFICANT CHANGE UP (ref 0–0.7)
EOSINOPHIL # BLD AUTO: 0.39 K/UL — SIGNIFICANT CHANGE UP (ref 0–0.7)
EOSINOPHIL NFR BLD AUTO: 1.9 % — SIGNIFICANT CHANGE UP (ref 0–8)
EOSINOPHIL NFR BLD AUTO: 2.2 % — SIGNIFICANT CHANGE UP (ref 0–8)
GLUCOSE SERPL-MCNC: 85 MG/DL — SIGNIFICANT CHANGE UP (ref 70–110)
HCT VFR BLD CALC: 28.4 % — LOW (ref 37–47)
HCT VFR BLD CALC: 29 % — LOW (ref 37–47)
HGB BLD-MCNC: 9.1 G/DL — LOW (ref 12–16)
HGB BLD-MCNC: 9.3 G/DL — LOW (ref 12–16)
IMM GRANULOCYTES NFR BLD AUTO: 0.9 % — HIGH (ref 0.1–0.3)
IMM GRANULOCYTES NFR BLD AUTO: 0.9 % — HIGH (ref 0.1–0.3)
INR BLD: 3.41 RATIO — HIGH (ref 0.65–1.3)
INR BLD: 3.66 RATIO — HIGH (ref 0.65–1.3)
LYMPHOCYTES # BLD AUTO: 1.07 K/UL — LOW (ref 1.2–3.4)
LYMPHOCYTES # BLD AUTO: 1.97 K/UL — SIGNIFICANT CHANGE UP (ref 1.2–3.4)
LYMPHOCYTES # BLD AUTO: 11.3 % — LOW (ref 20.5–51.1)
LYMPHOCYTES # BLD AUTO: 6.5 % — LOW (ref 20.5–51.1)
MAGNESIUM SERPL-MCNC: 1.5 MG/DL — LOW (ref 1.8–2.4)
MCHC RBC-ENTMCNC: 26.7 PG — LOW (ref 27–31)
MCHC RBC-ENTMCNC: 27.1 PG — SIGNIFICANT CHANGE UP (ref 27–31)
MCHC RBC-ENTMCNC: 32 G/DL — SIGNIFICANT CHANGE UP (ref 32–37)
MCHC RBC-ENTMCNC: 32.1 G/DL — SIGNIFICANT CHANGE UP (ref 32–37)
MCV RBC AUTO: 83.3 FL — SIGNIFICANT CHANGE UP (ref 81–99)
MCV RBC AUTO: 84.5 FL — SIGNIFICANT CHANGE UP (ref 81–99)
MONOCYTES # BLD AUTO: 1.4 K/UL — HIGH (ref 0.1–0.6)
MONOCYTES # BLD AUTO: 1.72 K/UL — HIGH (ref 0.1–0.6)
MONOCYTES NFR BLD AUTO: 8.5 % — SIGNIFICANT CHANGE UP (ref 1.7–9.3)
MONOCYTES NFR BLD AUTO: 9.8 % — HIGH (ref 1.7–9.3)
NEUTROPHILS # BLD AUTO: 13.18 K/UL — HIGH (ref 1.4–6.5)
NEUTROPHILS # BLD AUTO: 13.51 K/UL — HIGH (ref 1.4–6.5)
NEUTROPHILS NFR BLD AUTO: 75.5 % — HIGH (ref 42.2–75.2)
NEUTROPHILS NFR BLD AUTO: 82 % — HIGH (ref 42.2–75.2)
NRBC # BLD: 0 /100 WBCS — SIGNIFICANT CHANGE UP (ref 0–0)
NRBC # BLD: 0 /100 WBCS — SIGNIFICANT CHANGE UP (ref 0–0)
PHOSPHATE SERPL-MCNC: 3.7 MG/DL — SIGNIFICANT CHANGE UP (ref 2.1–4.9)
PLATELET # BLD AUTO: 683 K/UL — HIGH (ref 130–400)
PLATELET # BLD AUTO: 768 K/UL — HIGH (ref 130–400)
POTASSIUM SERPL-MCNC: 4.1 MMOL/L — SIGNIFICANT CHANGE UP (ref 3.5–5)
POTASSIUM SERPL-SCNC: 4.1 MMOL/L — SIGNIFICANT CHANGE UP (ref 3.5–5)
PROTHROM AB SERPL-ACNC: 37.8 SEC — HIGH (ref 9.95–12.87)
PROTHROM AB SERPL-ACNC: >40 SEC — HIGH (ref 9.95–12.87)
RBC # BLD: 3.41 M/UL — LOW (ref 4.2–5.4)
RBC # BLD: 3.43 M/UL — LOW (ref 4.2–5.4)
RBC # FLD: 23.4 % — HIGH (ref 11.5–14.5)
RBC # FLD: 23.5 % — HIGH (ref 11.5–14.5)
SODIUM SERPL-SCNC: 140 MMOL/L — SIGNIFICANT CHANGE UP (ref 135–146)
WBC # BLD: 16.48 K/UL — HIGH (ref 4.8–10.8)
WBC # BLD: 17.47 K/UL — HIGH (ref 4.8–10.8)
WBC # FLD AUTO: 16.48 K/UL — HIGH (ref 4.8–10.8)
WBC # FLD AUTO: 17.47 K/UL — HIGH (ref 4.8–10.8)

## 2018-03-03 RX ORDER — LABETALOL HCL 100 MG
10 TABLET ORAL ONCE
Qty: 0 | Refills: 0 | Status: COMPLETED | OUTPATIENT
Start: 2018-03-03 | End: 2018-03-03

## 2018-03-03 RX ORDER — MAGNESIUM SULFATE 500 MG/ML
2 VIAL (ML) INJECTION ONCE
Qty: 0 | Refills: 0 | Status: COMPLETED | OUTPATIENT
Start: 2018-03-03 | End: 2018-03-03

## 2018-03-03 RX ADMIN — Medication 50 MILLIGRAM(S): at 07:03

## 2018-03-03 RX ADMIN — HEPARIN SODIUM 5000 UNIT(S): 5000 INJECTION INTRAVENOUS; SUBCUTANEOUS at 13:45

## 2018-03-03 RX ADMIN — SERTRALINE 100 MILLIGRAM(S): 25 TABLET, FILM COATED ORAL at 12:11

## 2018-03-03 RX ADMIN — Medication 50 GRAM(S): at 12:43

## 2018-03-03 RX ADMIN — Medication 25 MICROGRAM(S): at 01:02

## 2018-03-03 RX ADMIN — Medication 125 MILLIGRAM(S): at 13:45

## 2018-03-03 RX ADMIN — LEVETIRACETAM 400 MILLIGRAM(S): 250 TABLET, FILM COATED ORAL at 18:06

## 2018-03-03 RX ADMIN — HEPARIN SODIUM 5000 UNIT(S): 5000 INJECTION INTRAVENOUS; SUBCUTANEOUS at 07:06

## 2018-03-03 RX ADMIN — Medication 0.25 MILLIGRAM(S): at 07:03

## 2018-03-03 RX ADMIN — HEPARIN SODIUM 5000 UNIT(S): 5000 INJECTION INTRAVENOUS; SUBCUTANEOUS at 22:09

## 2018-03-03 RX ADMIN — LEVETIRACETAM 400 MILLIGRAM(S): 250 TABLET, FILM COATED ORAL at 07:05

## 2018-03-03 RX ADMIN — Medication 125 MILLIGRAM(S): at 07:06

## 2018-03-03 RX ADMIN — Medication 50 MILLIGRAM(S): at 18:06

## 2018-03-03 RX ADMIN — Medication 25 MICROGRAM(S): at 22:09

## 2018-03-03 RX ADMIN — Medication 10 MILLIGRAM(S): at 01:28

## 2018-03-03 NOTE — CONSULT NOTE ADULT - SUBJECTIVE AND OBJECTIVE BOX
CHIEF COMPLAINT:Patient is a 78y old  Female who presents with a chief complaint of abdominal distention   HISTORY OF PRESENT ILLNESS:   79 yo F w/ PMH/PSH: afib on coumadin, CVA, dementia, DM, HLD, seizure, hypothyroidism. S/p recent ex lap, [facility] for SBO 2 weeks ago with complication of i/a mesenteric hematoma (stable). Was doing well after surgery and discharged on Feb 26 home. 1 day ago started to have diarhea, nonbloody, no fever, no n/v, but abdominal distention. Today complained on abdominal pain. Came with stable vitals, no fever, abdominal distention.    PMH/PSH: PMH/PSH: afib on coumadin, CVA, dementia, DM, HLD, seizure, hypothyroidism. S/p recent ex lap, [facility] for SBO 2 weeks ago with complication of i/a mesenteric hematoma (stable).    PAST MEDICAL & SURGICAL HISTORY:  Seizures  Dementia  High cholesterol  Hypothyroidism, unspecified type  Hypertension, unspecified type  Diabetes  Cerebrovascular accident (CVA), unspecified mechanism  Atrial fibrillation  H/O abdominal hysterectomy  S/P percutaneous endoscopic gastrostomy (PEG) tube placement: s/p removal of peg tube    FAMILY HISTORY:  No pertinent family history in first degree relatives    Allergies  No Known Allergies    Home Medications:  acetaminophen 325 mg oral tablet: 2 tab(s) orally every 6 hours (26 Feb 2018 13:57)  aspirin 81 mg oral tablet, chewable:  (18 Feb 2018 08:46)  digoxin 250 mcg (0.25 mg) oral tablet: 1 tab(s) orally once a day (26 Feb 2018 13:57)  dilTIAZem 90 mg oral tablet: 1 tab(s) orally every 6 hours (26 Feb 2018 13:57)  Diovan 160 mg oral tablet: 1 tab(s) orally once a day (18 Feb 2018 08:46)  Januvia:  (18 Feb 2018 08:46)  levETIRAcetam 1000 mg oral tablet: 1 tab(s) orally 2 times a day (18 Feb 2018 08:46)  levothyroxine 50 mcg (0.05 mg) oral tablet: 1 tab(s) orally once a day (18 Feb 2018 08:46)  Lipitor:  (18 Feb 2018 08:46)  metoprolol tartrate 50 mg oral tablet: 1 tab(s) orally 2 times a day (18 Feb 2018 08:46)  mirtazapine:  (18 Feb 2018 08:46)  Namenda:  (18 Feb 2018 08:46)  omeprazole 20 mg oral delayed release tablet: 1 tab(s) orally once a day (18 Feb 2018 08:46)  Pepcid 20 mg oral tablet: 1 tab(s) orally 2 times a day (18 Feb 2018 08:46)  Toviaz 8 mg oral tablet, extended release: 1 tab(s) orally once a day (18 Feb 2018 08:46)  traZODone 50 mg oral tablet: 1 tab(s) orally 3 times a day (18 Feb 2018 08:46)  Zoloft 100 mg oral tablet: 1 tab(s) orally once a day (18 Feb 2018 08:46)    MEDICATIONS  (STANDING):  atorvastatin 10 milliGRAM(s) Oral at bedtime  digoxin     Tablet 0.25 milliGRAM(s) Oral daily  diltiazem    Tablet 90 milliGRAM(s) Enteral Tube every 6 hours  heparin  Injectable 5000 Unit(s) SubCutaneous every 8 hours  levETIRAcetam  IVPB 1000 milliGRAM(s) IV Intermittent every 12 hours  levothyroxine Injectable 25 MICROGram(s) IV Push at bedtime  metoprolol     tartrate 50 milliGRAM(s) Enteral Tube two times a day  pantoprazole  Injectable 40 milliGRAM(s) IV Push daily  sertraline 100 milliGRAM(s) Oral daily  vancomycin    Solution 125 milliGRAM(s) Oral every 6 hours    SOCIAL HISTORY:    [ ] Non-smoker    REVIEW OF SYSTEMS:  Patient denies chest pain, shortness of breath, dyspnea on exertion, palpitations.     PHYSICAL EXAM:  T(C): 35.9 (03-03-18 @ 15:57), Max: 36.4 (03-03-18 @ 00:00)  HR: 63 (03-03-18 @ 15:57) (63 - 132)  BP: 163/73 (03-03-18 @ 15:57) (120/74 - 194/86)  RR: 18 (03-03-18 @ 15:57) (18 - 18)  SpO2: 95% (03-03-18 @ 15:57) (95% - 96%)      02 Mar 2018 07:01  -  03 Mar 2018 07:00  --------------------------------------------------------  IN: 1000 mL / OUT: 0 mL / NET: 1000 mL    03 Mar 2018 07:01  -  03 Mar 2018 21:23  --------------------------------------------------------  IN: 0 mL / OUT: 650 mL / NET: -650 mL    General Appearance: Normal	  Cardiovascular: Normal S1 S2, No JVD, No murmurs, No edema  Respiratory: Lungs clear to auscultation	  Psychiatry: A & O x 3, Mood & affect appropriate  Gastrointestinal:  Soft, Non-tender  Skin: No rashes, No ecchymoses, No cyanosis	  Neurologic: Non-focal  Extremities: Normal range of motion, No clubbing, cyanosis or edema  Vascular: Peripheral pulses palpable 2+ bilaterally        LABS:	 	                       9.1    16.48 )-----------( 683      ( 03 Mar 2018 12:26 )             28.4   03-03  140  |  105  |  5<L>  ----------------------------<  85  4.1   |  19  |  0.8  03-02  137  |  104  |  7<L>  ----------------------------<  86  3.4<L>   |  22  |  0.8  Ca    8.0<L>      03 Mar 2018 06:50  Ca    7.7<L>      02 Mar 2018 11:00  Phos  3.7     03-03  Mg     1.5     03-03    CARDIAC MARKERS:  Troponin I, Serum: 0.06 ng/mL (03-02 @ 21:00)    TELEMETRY EVENTS: 	    ECG:  	< from: 12 Lead ECG (03.02.18 @ 21:54) >  Atrial fibrillation with rapid ventricular response  Nonspecific ST and T wave abnormality  Abnormal ECG  < end of copied text >    RADIOLOGY:< from: Xray Chest 1 View- PORTABLE-Urgent (03.02.18 @ 05:13) >  New enteric tube with tip extending below gastroesophageal junction.  Unchanged lungs.  < end of copied text >  < from: CT Abdomen and Pelvis w/ Oral Cont and w/ IV Cont (03.02.18 @ 01:05) >    IMPRESSION:  Since 2/20/2018:  Similar to slightly increased dilatation of small bowel loops consistent   with recurrent or residual small bowel obstruction..  Stable hematoma in the right lower quadrant small bowel mesentery   measuring 7.1 x 4.7 cm.  Increasing bilateral pleural effusions.  < end of copied text >        OTHER: 	    PREVIOUS DIAGNOSTIC TESTING:    [ ] Echocardiogram:< from: Transthoracic Echocardiogram (02.20.18 @ 08:27) >   1. Left ventricular ejection fraction, by visual estimation, is 60 to   65%.   2. Normal left ventricular size and wall thicknesses, with normal   systolic and diastolic function.   3. Mild aortic regurgitation.   4. Estimated pulmonary artery systolic pressure is 45.1 mmHg assuming a   right atrial pressure of 8 mmHg, which is consistent with mild pulmonary   hypertension.   5.Pulmonary hypertension is present.   6. LA volume Index is 34.2 ml/m² ml/m2.  < end of copied text > CHIEF COMPLAINT:Patient is a 78y old  Female who presents with a chief complaint of abdominal distention   HISTORY OF PRESENT ILLNESS:   77 yo F w/ PMH/PSH: afib on coumadin, CVA, dementia, DM, HLD, seizure, hypothyroidism. S/p recent ex lap, [facility] for SBO 2 weeks ago with complication of i/a mesenteric hematoma (stable). Was doing well after surgery and discharged on Feb 26 home. 1 day ago started to have diarhea, nonbloody, no fever, no n/v, but abdominal distention. Today complained on abdominal pain. Came with stable vitals, no fever, abdominal distention.    PMH/PSH: PMH/PSH: afib on coumadin, CVA, dementia, DM, HLD, seizure, hypothyroidism. S/p recent ex lap, [facility] for SBO 2 weeks ago with complication of i/a mesenteric hematoma (stable).    PAST MEDICAL & SURGICAL HISTORY:  Seizures  Dementia  High cholesterol  Hypothyroidism  Hypertension  Diabetes  Cerebrovascular accident (CVA), unspecified mechanism  Atrial fibrillation  H/O abdominal hysterectomy  S/P percutaneous endoscopic gastrostomy (PEG) tube placement: s/p removal of peg tube    FAMILY HISTORY:  No pertinent family history in first degree relatives    Allergies  No Known Allergies    Home Medications:  acetaminophen 325 mg oral tablet: 2 tab(s) orally every 6 hours (26 Feb 2018 13:57)  aspirin 81 mg oral tablet, chewable:  (18 Feb 2018 08:46)  digoxin 250 mcg (0.25 mg) oral tablet: 1 tab(s) orally once a day (26 Feb 2018 13:57)  dilTIAZem 90 mg oral tablet: 1 tab(s) orally every 6 hours (26 Feb 2018 13:57)  Diovan 160 mg oral tablet: 1 tab(s) orally once a day (18 Feb 2018 08:46)  Januvia:  (18 Feb 2018 08:46)  levETIRAcetam 1000 mg oral tablet: 1 tab(s) orally 2 times a day (18 Feb 2018 08:46)  levothyroxine 50 mcg (0.05 mg) oral tablet: 1 tab(s) orally once a day (18 Feb 2018 08:46)  Lipitor:  (18 Feb 2018 08:46)  metoprolol tartrate 50 mg oral tablet: 1 tab(s) orally 2 times a day (18 Feb 2018 08:46)  mirtazapine:  (18 Feb 2018 08:46)  Namenda:  (18 Feb 2018 08:46)  omeprazole 20 mg oral delayed release tablet: 1 tab(s) orally once a day (18 Feb 2018 08:46)  Pepcid 20 mg oral tablet: 1 tab(s) orally 2 times a day (18 Feb 2018 08:46)  Toviaz 8 mg oral tablet, extended release: 1 tab(s) orally once a day (18 Feb 2018 08:46)  traZODone 50 mg oral tablet: 1 tab(s) orally 3 times a day (18 Feb 2018 08:46)  Zoloft 100 mg oral tablet: 1 tab(s) orally once a day (18 Feb 2018 08:46)    MEDICATIONS  (STANDING):  atorvastatin 10 milliGRAM(s) Oral at bedtime  digoxin     Tablet 0.25 milliGRAM(s) Oral daily  diltiazem    Tablet 90 milliGRAM(s) Enteral Tube every 6 hours  heparin  Injectable 5000 Unit(s) SubCutaneous every 8 hours  levETIRAcetam  IVPB 1000 milliGRAM(s) IV Intermittent every 12 hours  levothyroxine Injectable 25 MICROGram(s) IV Push at bedtime  metoprolol     tartrate 50 milliGRAM(s) Enteral Tube two times a day  pantoprazole  Injectable 40 milliGRAM(s) IV Push daily  sertraline 100 milliGRAM(s) Oral daily  vancomycin    Solution 125 milliGRAM(s) Oral every 6 hours    SOCIAL HISTORY:    [ ] Non-smoker    REVIEW OF SYSTEMS:  Patient denies chest pain, shortness of breath, dyspnea on exertion, palpitations.     PHYSICAL EXAM:  T(C): 35.9 (03-03-18 @ 15:57), Max: 36.4 (03-03-18 @ 00:00)  HR: 63 (03-03-18 @ 15:57) (63 - 132)  BP: 163/73 (03-03-18 @ 15:57) (120/74 - 194/86)  RR: 18 (03-03-18 @ 15:57) (18 - 18)  SpO2: 95% (03-03-18 @ 15:57) (95% - 96%)      02 Mar 2018 07:01  -  03 Mar 2018 07:00  --------------------------------------------------------  IN: 1000 mL / OUT: 0 mL / NET: 1000 mL    03 Mar 2018 07:01  -  03 Mar 2018 21:23  --------------------------------------------------------  IN: 0 mL / OUT: 650 mL / NET: -650 mL    General Appearance: Normal	  Cardiovascular: Normal S1 S2, No JVD, No murmurs, No edema  Respiratory: Lungs clear to auscultation	  Psychiatry: A & O x 3, Mood & affect appropriate  Gastrointestinal:  Soft, Non-tender  Skin: No rashes, No ecchymoses, No cyanosis	  Neurologic: Non-focal  Extremities: Normal range of motion, No clubbing, cyanosis or edema  Vascular: Peripheral pulses palpable 2+ bilaterally        LABS:	 	                       9.1    16.48 )-----------( 683      ( 03 Mar 2018 12:26 )             28.4   03-03  140  |  105  |  5<L>  ----------------------------<  85  4.1   |  19  |  0.8  03-02  137  |  104  |  7<L>  ----------------------------<  86  3.4<L>   |  22  |  0.8  Ca    8.0<L>      03 Mar 2018 06:50  Ca    7.7<L>      02 Mar 2018 11:00  Phos  3.7     03-03  Mg     1.5     03-03    CARDIAC MARKERS:  Troponin I, Serum: 0.06 ng/mL (03-02 @ 21:00)    TELEMETRY EVENTS: 	    ECG:  	< from: 12 Lead ECG (03.02.18 @ 21:54) >  Atrial fibrillation with rapid ventricular response  Nonspecific ST and T wave abnormality  Abnormal ECG  < end of copied text >    RADIOLOGY:< from: Xray Chest 1 View- PORTABLE-Urgent (03.02.18 @ 05:13) >  New enteric tube with tip extending below gastroesophageal junction.  Unchanged lungs.  < end of copied text >  < from: CT Abdomen and Pelvis w/ Oral Cont and w/ IV Cont (03.02.18 @ 01:05) >    IMPRESSION:  Since 2/20/2018:  Similar to slightly increased dilatation of small bowel loops consistent   with recurrent or residual small bowel obstruction..  Stable hematoma in the right lower quadrant small bowel mesentery   measuring 7.1 x 4.7 cm.  Increasing bilateral pleural effusions.  < end of copied text >        OTHER: 	    PREVIOUS DIAGNOSTIC TESTING:    [ ] Echocardiogram:< from: Transthoracic Echocardiogram (02.20.18 @ 08:27) >   1. Left ventricular ejection fraction, by visual estimation, is 60 to   65%.   2. Normal left ventricular size and wall thicknesses, with normal   systolic and diastolic function.   3. Mild aortic regurgitation.   4. Estimated pulmonary artery systolic pressure is 45.1 mmHg assuming a   right atrial pressure of 8 mmHg, which is consistent with mild pulmonary   hypertension.   5.Pulmonary hypertension is present.   6. LA volume Index is 34.2 ml/m² ml/m2.  < end of copied text > CHIEF COMPLAINT:Patient is a 78y old  Female who presents with a chief complaint of abdominal distention   HISTORY OF PRESENT ILLNESS:   79 yo F w/ PMH/PSH: afib on coumadin, CVA, dementia, DM, HLD, seizure, hypothyroidism. S/p recent ex lap, [facility] for SBO 2 weeks ago with complication of i/a mesenteric hematoma (stable). Was doing well after surgery and discharged on Feb 26 home. 1 day ago started to have diarhea, nonbloody, no fever, no n/v, but abdominal distention. Today complained on abdominal pain. Came with stable vitals, no fever, abdominal distention.    PMH/PSH: PMH/PSH: afib on coumadin, CVA, dementia, DM, HLD, seizure, hypothyroidism. S/p recent ex lap, [facility] for SBO 2 weeks ago with complication of i/a mesenteric hematoma (stable).    PAST MEDICAL & SURGICAL HISTORY:  Seizures  Dementia  High cholesterol  Hypothyroidism  Hypertension  Diabetes  Cerebrovascular accident (CVA)  Atrial fibrillation  H/O abdominal hysterectomy  S/P percutaneous endoscopic gastrostomy (PEG) tube placement: s/p removal of peg tube    FAMILY HISTORY:  No pertinent family history in first degree relatives    Allergies  No Known Allergies    Home Medications:  acetaminophen 325 mg oral tablet: 2 tab(s) orally every 6 hours (26 Feb 2018 13:57)  aspirin 81 mg oral tablet, chewable:  (18 Feb 2018 08:46)  digoxin 250 mcg (0.25 mg) oral tablet: 1 tab(s) orally once a day (26 Feb 2018 13:57)  dilTIAZem 90 mg oral tablet: 1 tab(s) orally every 6 hours (26 Feb 2018 13:57)  Diovan 160 mg oral tablet: 1 tab(s) orally once a day (18 Feb 2018 08:46)  Januvia:  (18 Feb 2018 08:46)  levETIRAcetam 1000 mg oral tablet: 1 tab(s) orally 2 times a day (18 Feb 2018 08:46)  levothyroxine 50 mcg (0.05 mg) oral tablet: 1 tab(s) orally once a day (18 Feb 2018 08:46)  Lipitor:  (18 Feb 2018 08:46)  metoprolol tartrate 50 mg oral tablet: 1 tab(s) orally 2 times a day (18 Feb 2018 08:46)  mirtazapine:  (18 Feb 2018 08:46)  Namenda:  (18 Feb 2018 08:46)  omeprazole 20 mg oral delayed release tablet: 1 tab(s) orally once a day (18 Feb 2018 08:46)  Pepcid 20 mg oral tablet: 1 tab(s) orally 2 times a day (18 Feb 2018 08:46)  Toviaz 8 mg oral tablet, extended release: 1 tab(s) orally once a day (18 Feb 2018 08:46)  traZODone 50 mg oral tablet: 1 tab(s) orally 3 times a day (18 Feb 2018 08:46)  Zoloft 100 mg oral tablet: 1 tab(s) orally once a day (18 Feb 2018 08:46)    MEDICATIONS  (STANDING):  atorvastatin 10 milliGRAM(s) Oral at bedtime  digoxin     Tablet 0.25 milliGRAM(s) Oral daily  diltiazem    Tablet 90 milliGRAM(s) Enteral Tube every 6 hours  heparin  Injectable 5000 Unit(s) SubCutaneous every 8 hours  levETIRAcetam  IVPB 1000 milliGRAM(s) IV Intermittent every 12 hours  levothyroxine Injectable 25 MICROGram(s) IV Push at bedtime  metoprolol     tartrate 50 milliGRAM(s) Enteral Tube two times a day  pantoprazole  Injectable 40 milliGRAM(s) IV Push daily  sertraline 100 milliGRAM(s) Oral daily  vancomycin    Solution 125 milliGRAM(s) Oral every 6 hours    SOCIAL HISTORY:    [ ] Non-smoker    REVIEW OF SYSTEMS:  Patient denies chest pain, shortness of breath, dyspnea on exertion, palpitations.     PHYSICAL EXAM:  T(C): 35.9 (03-03-18 @ 15:57), Max: 36.4 (03-03-18 @ 00:00)  HR: 63 (03-03-18 @ 15:57) (63 - 132)  BP: 163/73 (03-03-18 @ 15:57) (120/74 - 194/86)  RR: 18 (03-03-18 @ 15:57) (18 - 18)  SpO2: 95% (03-03-18 @ 15:57) (95% - 96%)  02 Mar 2018 07:01  -  03 Mar 2018 07:00  --------------------------------------------------------  IN: 1000 mL / OUT: 0 mL / NET: 1000 mL    03 Mar 2018 07:01  -  03 Mar 2018 21:23  --------------------------------------------------------  IN: 0 mL / OUT: 650 mL / NET: -650 mL    General Appearance: Normal	  Cardiovascular: Normal S1 S2, No JVD, No murmurs, No edema  Respiratory: Lungs clear to auscultation	  Psychiatry: A & O x 3, Mood & affect appropriate  Gastrointestinal:  Soft, Non-tender  Skin: No rashes, No ecchymoses, No cyanosis	  Neurologic: Non-focal  Extremities: Normal range of motion, No clubbing, cyanosis or edema  Vascular: Peripheral pulses palpable 2+ bilaterally        LABS:	 	                       9.1    16.48 )-----------( 683      ( 03 Mar 2018 12:26 )             28.4   03-03  140  |  105  |  5<L>  ----------------------------<  85  4.1   |  19  |  0.8  03-02  137  |  104  |  7<L>  ----------------------------<  86  3.4<L>   |  22  |  0.8  Ca    8.0<L>      03 Mar 2018 06:50  Ca    7.7<L>      02 Mar 2018 11:00  Phos  3.7     03-03  Mg     1.5     03-03    CARDIAC MARKERS:  Troponin I, Serum: 0.06 ng/mL (03-02 @ 21:00)    TELEMETRY EVENTS: 	    ECG:  	< from: 12 Lead ECG (03.02.18 @ 21:54) >  Atrial fibrillation with rapid ventricular response  Nonspecific ST and T wave abnormality  Abnormal ECG  < end of copied text >    RADIOLOGY:< from: Xray Chest 1 View- PORTABLE-Urgent (03.02.18 @ 05:13) >  New enteric tube with tip extending below gastroesophageal junction.  Unchanged lungs.  < end of copied text >  < from: CT Abdomen and Pelvis w/ Oral Cont and w/ IV Cont (03.02.18 @ 01:05) >    IMPRESSION:  Since 2/20/2018:  Similar to slightly increased dilatation of small bowel loops consistent   with recurrent or residual small bowel obstruction..  Stable hematoma in the right lower quadrant small bowel mesentery   measuring 7.1 x 4.7 cm.  Increasing bilateral pleural effusions.  < end of copied text >        OTHER: 	    PREVIOUS DIAGNOSTIC TESTING:    [ ] Echocardiogram:< from: Transthoracic Echocardiogram (02.20.18 @ 08:27) >   1. Left ventricular ejection fraction, by visual estimation, is 60 to   65%.   2. Normal left ventricular size and wall thicknesses, with normal   systolic and diastolic function.   3. Mild aortic regurgitation.   4. Estimated pulmonary artery systolic pressure is 45.1 mmHg assuming a   right atrial pressure of 8 mmHg, which is consistent with mild pulmonary   hypertension.   5.Pulmonary hypertension is present.   6. LA volume Index is 34.2 ml/m² ml/m2.  < end of copied text > CHIEF COMPLAINT:Patient is a 78y old  Female who presents with a chief complaint of abdominal distention   HISTORY OF PRESENT ILLNESS:   77 yo F w/ PMH/PSH: afib on coumadin, CVA, dementia, DM, HLD, seizure, hypothyroidism. S/p recent ex lap, [facility] for SBO 2 weeks ago with complication of i/a mesenteric hematoma (stable). Was doing well after surgery and discharged on Feb 26 home. 1 day ago started to have diarhea, nonbloody, no fever, no n/v, but abdominal distention. Today complained on abdominal pain. Came with stable vitals, no fever, abdominal distention.  no active [facility] complains    PMH/PSH: PMH/PSH: afib on coumadin, CVA, dementia, DM, HLD, seizure, hypothyroidism. S/p recent ex lap, [facility] for SBO 2 weeks ago with complication of i/a mesenteric hematoma (stable).    PAST MEDICAL & SURGICAL HISTORY:  Seizures  Dementia  High cholesterol  Hypothyroidism  Hypertension  Diabetes  Cerebrovascular accident (CVA)  Atrial fibrillation  H/O abdominal hysterectomy  S/P percutaneous endoscopic gastrostomy (PEG) tube placement: s/p removal of peg tube    FAMILY HISTORY:  No pertinent family history in first degree relatives    Allergies  No Known Allergies    Home Medications:  acetaminophen 325 mg oral tablet: 2 tab(s) orally every 6 hours (26 Feb 2018 13:57)  aspirin 81 mg oral tablet, chewable:  (18 Feb 2018 08:46)  digoxin 250 mcg (0.25 mg) oral tablet: 1 tab(s) orally once a day (26 Feb 2018 13:57)  dilTIAZem 90 mg oral tablet: 1 tab(s) orally every 6 hours (26 Feb 2018 13:57)  Diovan 160 mg oral tablet: 1 tab(s) orally once a day (18 Feb 2018 08:46)  Januvia:  (18 Feb 2018 08:46)  levETIRAcetam 1000 mg oral tablet: 1 tab(s) orally 2 times a day (18 Feb 2018 08:46)  levothyroxine 50 mcg (0.05 mg) oral tablet: 1 tab(s) orally once a day (18 Feb 2018 08:46)  Lipitor:  (18 Feb 2018 08:46)  metoprolol tartrate 50 mg oral tablet: 1 tab(s) orally 2 times a day (18 Feb 2018 08:46)  mirtazapine:  (18 Feb 2018 08:46)  Namenda:  (18 Feb 2018 08:46)  omeprazole 20 mg oral delayed release tablet: 1 tab(s) orally once a day (18 Feb 2018 08:46)  Pepcid 20 mg oral tablet: 1 tab(s) orally 2 times a day (18 Feb 2018 08:46)  Toviaz 8 mg oral tablet, extended release: 1 tab(s) orally once a day (18 Feb 2018 08:46)  traZODone 50 mg oral tablet: 1 tab(s) orally 3 times a day (18 Feb 2018 08:46)  Zoloft 100 mg oral tablet: 1 tab(s) orally once a day (18 Feb 2018 08:46)    MEDICATIONS  (STANDING):  atorvastatin 10 milliGRAM(s) Oral at bedtime  digoxin     Tablet 0.25 milliGRAM(s) Oral daily  diltiazem    Tablet 90 milliGRAM(s) Enteral Tube every 6 hours  heparin  Injectable 5000 Unit(s) SubCutaneous every 8 hours  levETIRAcetam  IVPB 1000 milliGRAM(s) IV Intermittent every 12 hours  levothyroxine Injectable 25 MICROGram(s) IV Push at bedtime  metoprolol     tartrate 50 milliGRAM(s) Enteral Tube two times a day  pantoprazole  Injectable 40 milliGRAM(s) IV Push daily  sertraline 100 milliGRAM(s) Oral daily  vancomycin    Solution 125 milliGRAM(s) Oral every 6 hours    SOCIAL HISTORY:    [ ] Non-smoker    REVIEW OF SYSTEMS:  Patient denies chest pain, shortness of breath, dyspnea on exertion, palpitations.     PHYSICAL EXAM:  T(C): 35.9 (03-03-18 @ 15:57), Max: 36.4 (03-03-18 @ 00:00)  HR: 63 (03-03-18 @ 15:57) (63 - 132)  BP: 163/73 (03-03-18 @ 15:57) (120/74 - 194/86)  RR: 18 (03-03-18 @ 15:57) (18 - 18)  SpO2: 95% (03-03-18 @ 15:57) (95% - 96%)  02 Mar 2018 07:01  -  03 Mar 2018 07:00  --------------------------------------------------------  IN: 1000 mL / OUT: 0 mL / NET: 1000 mL    03 Mar 2018 07:01  -  03 Mar 2018 21:23  --------------------------------------------------------  IN: 0 mL / OUT: 650 mL / NET: -650 mL    General Appearance: Normal	  Cardiovascular: Normal S1 S2, No JVD, No murmurs, No edema  Respiratory: Lungs clear to auscultation	  Psychiatry: A & O x 3, Mood & affect appropriate  Gastrointestinal:  Soft, Non-tender  Skin: No rashes, No ecchymoses, No cyanosis	  Neurologic: Non-focal  Extremities: Normal range of motion, No clubbing, cyanosis or edema  Vascular: Peripheral pulses palpable 2+ bilaterally        LABS:	 	                       9.1    16.48 )-----------( 683      ( 03 Mar 2018 12:26 )             28.4   03-03  140  |  105  |  5<L>  ----------------------------<  85  4.1   |  19  |  0.8  03-02  137  |  104  |  7<L>  ----------------------------<  86  3.4<L>   |  22  |  0.8  Ca    8.0<L>      03 Mar 2018 06:50  Ca    7.7<L>      02 Mar 2018 11:00  Phos  3.7     03-03  Mg     1.5     03-03    CARDIAC MARKERS:  Troponin I, Serum: 0.06 ng/mL (03-02 @ 21:00)    TELEMETRY EVENTS: 	    ECG:  	< from: 12 Lead ECG (03.02.18 @ 21:54) >  Atrial fibrillation with rapid ventricular response  Nonspecific ST and T wave abnormality  Abnormal ECG  < end of copied text >    RADIOLOGY:< from: Xray Chest 1 View- PORTABLE-Urgent (03.02.18 @ 05:13) >  New enteric tube with tip extending below gastroesophageal junction.  Unchanged lungs.  < end of copied text >  < from: CT Abdomen and Pelvis w/ Oral Cont and w/ IV Cont (03.02.18 @ 01:05) >    IMPRESSION:  Since 2/20/2018:  Similar to slightly increased dilatation of small bowel loops consistent   with recurrent or residual small bowel obstruction..  Stable hematoma in the right lower quadrant small bowel mesentery   measuring 7.1 x 4.7 cm.  Increasing bilateral pleural effusions.  < end of copied text >        OTHER: 	    PREVIOUS DIAGNOSTIC TESTING:    [ ] Echocardiogram:< from: Transthoracic Echocardiogram (02.20.18 @ 08:27) >   1. Left ventricular ejection fraction, by visual estimation, is 60 to   65%.   2. Normal left ventricular size and wall thicknesses, with normal   systolic and diastolic function.   3. Mild aortic regurgitation.   4. Estimated pulmonary artery systolic pressure is 45.1 mmHg assuming a   right atrial pressure of 8 mmHg, which is consistent with mild pulmonary   hypertension.   5.Pulmonary hypertension is present.   6. LA volume Index is 34.2 ml/m² ml/m2.  < end of copied text >

## 2018-03-03 NOTE — PROGRESS NOTE ADULT - SUBJECTIVE AND OBJECTIVE BOX
Progress Note: Trauma Surgery   Patient: IRA PACKER , 78y (1940)Female   MRN: [id number]  Location: [facility] 007 A  Visit: 03-02-18 Inpatient  Date: 03-03-18 @ 04:05  Hospital Day: 2    Injury/Diagnosis/Procedure: ABDOMINAL DISTENSION, DIARRHEA X7; +C. diff. SBO, S/P EX LAP [place] FOR SBO 2 WEEKS PRIOR  Events over 24h: (+) C. diff, patient tachy overnight 110-115, h/o Afib, going to telemetry awaiting bed    Vitals: T(F): 97.5 (03-03-18 @ 00:00), Max: 98.8 (03-02-18 @ 04:17)  HR: 80 (03-03-18 @ 01:29)  BP: 120/74 (03-03-18 @ 01:29) (120/74 - 194/86)  RR: 18 (03-03-18 @ 00:40)  SpO2: 96% (03-03-18 @ 00:40)    Diet: NPO  IV Fluids: yes , Type: lactated ringers. 1000 milliLiter(s) (75 mL/Hr) IV Continuous <Continuous>    Physical Examination:  General Appearance: confused, BL demented  HEENT: NCAT, WNL  Heart: S1 and S2. No murmurs. Rhythm is irregular.   Lungs: Clear to auscultation BL without rales, rhonchi, wheezing or diminished breath sounds.  Abdomen:  Positive bowel sounds. Soft, mildly distended, nontender.   Skin: Warm/dry, Normal color, texture and turgor with no lesions or eruptions. No jaundice.     Medications:  atorvastatin 10 milliGRAM(s) Oral at bedtime  digoxin     Tablet 0.25 milliGRAM(s) Oral daily  diltiazem    Tablet 90 milliGRAM(s) Enteral Tube every 6 hours  heparin  Injectable 5000 Unit(s) SubCutaneous every 8 hours  levETIRAcetam  IVPB 1000 milliGRAM(s) IV Intermittent every 12 hours  levothyroxine Injectable 25 MICROGram(s) IV Push at bedtime  metoprolol     tartrate 50 milliGRAM(s) Enteral Tube two times a day  pantoprazole  Injectable 40 milliGRAM(s) IV Push daily  sertraline 100 milliGRAM(s) Oral daily  vancomycin    Solution 125 milliGRAM(s) Oral every 6 hours      Labs:                        9.4    15.36 )-----------( 723      ( 02 Mar 2018 11:00 )             28.1   03-02    137  |  104  |  7<L>  ----------------------------<  86  3.4<L>   |  22  |  0.8    Ca    7.7<L>      02 Mar 2018 11:00    Imaging:  None

## 2018-03-03 NOTE — PROGRESS NOTE ADULT - ASSESSMENT
Assessment:  78yFemale patient admitted with ABDOMINAL DISTENSION, DIARRHEA X7; SBO, S/P EX LAP [place] FOR SBO 2 WEEKS PRIOR. (+) C. diff, patient tachy overnight 110-115, h/o Afib, going to telemetry awaiting bed, with the above physical exam, labs, and imaging findings.    Plan:  F/U cardiology C/s (Dr. Caba)  plan for telemetry bed  patient refusing oral meds  C/w non-op mgmt    Date/Time: 03-03-18 @ 04:05

## 2018-03-03 NOTE — CONSULT NOTE ADULT - ASSESSMENT
Atrial fibrillation rate controlled on digoxin anticoagulation on hold 2 Intraabdominal hematoma   Seizures  Dementia  High cholesterol  Hypothyroidism  Hypertension  Diabetes  Cerebrovascular accident (CVA)    Plan:  Afib with CHADs Vasc ~7 recommend resuming anticoagulation once cleared by the surgical team   rate controlled with Digoxin Atrial fibrillation rate controlled (Sinus rythm on the monitor) on digoxin anticoagulation on hold 2 Intraabdominal hematoma   Seizures  Dementia  High cholesterol  Hypothyroidism  Hypertension  Diabetes  Cerebrovascular accident (CVA)    Plan:  Afib with CHADs Vasc ~7 recommend resuming anticoagulation once cleared by the surgical team   rate controlled  repeat ECG  Check digoxin levels Atrial fibrillation rate controlled (Sinus rythm on the monitor) on digoxin anticoagulation on hold 2 Intraabdominal hematoma   Seizures  Dementia  High cholesterol  Hypothyroidism  Hypertension  Diabetes  Cerebrovascular accident (CVA)    Plan:  Afib with CHADs Vasc ~7 recommend resuming anticoagulation once cleared by the surgical team   rate controll  repeat ECG  Check digoxin levels   will follow

## 2018-03-04 LAB
ANION GAP SERPL CALC-SCNC: 15 MMOL/L — HIGH (ref 7–14)
APTT BLD: 37.9 SEC — SIGNIFICANT CHANGE UP (ref 27–39.2)
BASOPHILS # BLD AUTO: 0.05 K/UL — SIGNIFICANT CHANGE UP (ref 0–0.2)
BASOPHILS NFR BLD AUTO: 0.3 % — SIGNIFICANT CHANGE UP (ref 0–1)
BUN SERPL-MCNC: 5 MG/DL — LOW (ref 10–20)
CALCIUM SERPL-MCNC: 8.3 MG/DL — LOW (ref 8.5–10.1)
CHLORIDE SERPL-SCNC: 107 MMOL/L — SIGNIFICANT CHANGE UP (ref 98–110)
CO2 SERPL-SCNC: 17 MMOL/L — SIGNIFICANT CHANGE UP (ref 17–32)
CREAT SERPL-MCNC: 0.8 MG/DL — SIGNIFICANT CHANGE UP (ref 0.7–1.5)
EOSINOPHIL # BLD AUTO: 0.26 K/UL — SIGNIFICANT CHANGE UP (ref 0–0.7)
EOSINOPHIL NFR BLD AUTO: 1.5 % — SIGNIFICANT CHANGE UP (ref 0–8)
GLUCOSE SERPL-MCNC: 125 MG/DL — HIGH (ref 70–110)
HCT VFR BLD CALC: 29.5 % — LOW (ref 37–47)
HGB BLD-MCNC: 9.4 G/DL — LOW (ref 12–16)
IMM GRANULOCYTES NFR BLD AUTO: 0.7 % — HIGH (ref 0.1–0.3)
INR BLD: 3.57 RATIO — HIGH (ref 0.65–1.3)
LYMPHOCYTES # BLD AUTO: 1.39 K/UL — SIGNIFICANT CHANGE UP (ref 1.2–3.4)
LYMPHOCYTES # BLD AUTO: 7.8 % — LOW (ref 20.5–51.1)
MAGNESIUM SERPL-MCNC: 1.9 MG/DL — SIGNIFICANT CHANGE UP (ref 1.8–2.4)
MCHC RBC-ENTMCNC: 26.9 PG — LOW (ref 27–31)
MCHC RBC-ENTMCNC: 31.9 G/DL — LOW (ref 32–37)
MCV RBC AUTO: 84.3 FL — SIGNIFICANT CHANGE UP (ref 81–99)
MONOCYTES # BLD AUTO: 1.28 K/UL — HIGH (ref 0.1–0.6)
MONOCYTES NFR BLD AUTO: 7.2 % — SIGNIFICANT CHANGE UP (ref 1.7–9.3)
NEUTROPHILS # BLD AUTO: 14.75 K/UL — HIGH (ref 1.4–6.5)
NEUTROPHILS NFR BLD AUTO: 82.5 % — HIGH (ref 42.2–75.2)
PHOSPHATE SERPL-MCNC: 3.1 MG/DL — SIGNIFICANT CHANGE UP (ref 2.1–4.9)
PLATELET # BLD AUTO: 819 K/UL — HIGH (ref 130–400)
POTASSIUM SERPL-MCNC: 3.5 MMOL/L — SIGNIFICANT CHANGE UP (ref 3.5–5)
POTASSIUM SERPL-SCNC: 3.5 MMOL/L — SIGNIFICANT CHANGE UP (ref 3.5–5)
PROTHROM AB SERPL-ACNC: 39.6 SEC — HIGH (ref 9.95–12.87)
RBC # BLD: 3.5 M/UL — LOW (ref 4.2–5.4)
RBC # FLD: 23.6 % — HIGH (ref 11.5–14.5)
SODIUM SERPL-SCNC: 139 MMOL/L — SIGNIFICANT CHANGE UP (ref 135–146)
WBC # BLD: 17.86 K/UL — HIGH (ref 4.8–10.8)
WBC # FLD AUTO: 17.86 K/UL — HIGH (ref 4.8–10.8)

## 2018-03-04 RX ORDER — POTASSIUM CHLORIDE 20 MEQ
20 PACKET (EA) ORAL ONCE
Qty: 0 | Refills: 0 | Status: COMPLETED | OUTPATIENT
Start: 2018-03-04 | End: 2018-03-04

## 2018-03-04 RX ORDER — METRONIDAZOLE 500 MG
500 TABLET ORAL EVERY 8 HOURS
Qty: 0 | Refills: 0 | Status: DISCONTINUED | OUTPATIENT
Start: 2018-03-04 | End: 2018-03-05

## 2018-03-04 RX ORDER — SODIUM CHLORIDE 9 MG/ML
1000 INJECTION INTRAMUSCULAR; INTRAVENOUS; SUBCUTANEOUS
Qty: 0 | Refills: 0 | Status: DISCONTINUED | OUTPATIENT
Start: 2018-03-04 | End: 2018-03-13

## 2018-03-04 RX ADMIN — Medication 50 MILLIGRAM(S): at 17:23

## 2018-03-04 RX ADMIN — Medication 50 MILLIGRAM(S): at 07:23

## 2018-03-04 RX ADMIN — Medication 100 MILLIGRAM(S): at 15:40

## 2018-03-04 RX ADMIN — LEVETIRACETAM 400 MILLIGRAM(S): 250 TABLET, FILM COATED ORAL at 07:25

## 2018-03-04 RX ADMIN — Medication 125 MILLIGRAM(S): at 11:24

## 2018-03-04 RX ADMIN — Medication 0.25 MILLIGRAM(S): at 07:24

## 2018-03-04 RX ADMIN — PANTOPRAZOLE SODIUM 40 MILLIGRAM(S): 20 TABLET, DELAYED RELEASE ORAL at 11:24

## 2018-03-04 RX ADMIN — SERTRALINE 100 MILLIGRAM(S): 25 TABLET, FILM COATED ORAL at 11:24

## 2018-03-04 RX ADMIN — HEPARIN SODIUM 5000 UNIT(S): 5000 INJECTION INTRAVENOUS; SUBCUTANEOUS at 07:25

## 2018-03-04 RX ADMIN — Medication 125 MILLIGRAM(S): at 06:15

## 2018-03-04 RX ADMIN — Medication 125 MILLIGRAM(S): at 17:23

## 2018-03-04 RX ADMIN — HEPARIN SODIUM 5000 UNIT(S): 5000 INJECTION INTRAVENOUS; SUBCUTANEOUS at 23:50

## 2018-03-04 NOTE — PROGRESS NOTE ADULT - SUBJECTIVE AND OBJECTIVE BOX
78y Female PAST MEDICAL & SURGICAL HISTORY:  Seizures  Dementia  High cholesterol  Hypothyroidism, unspecified type  Hypertension, unspecified type  Diabetes  Cerebrovascular accident (CVA), unspecified mechanism  Atrial fibrillation, unspecified type  H/O abdominal hysterectomy  S/P percutaneous endoscopic gastrostomy (PEG) tube placement: s/p removal of peg tube      Hospital Day: 3    Events of the Last 24h: s/p readmission, patient was thought to have SBO but was found to have cdif, admitted and placed on telemetry as she was not taking home medication currently rate controlled and being treated with oral vanco    Vital Signs Last 24 Hrs  T(C): 36.7 (04 Mar 2018 01:00), Max: 36.7 (04 Mar 2018 01:00)  T(F): 98 (04 Mar 2018 01:00), Max: 98 (04 Mar 2018 01:)  HR: 64 (04 Mar 2018 01:00) (63 - 97)  BP: 165/74 (04 Mar 2018 01:00) (163/73 - 189/91)  BP(mean): --  RR: 18 (04 Mar 2018 01:00) (18 - 18)  SpO2: 95% (03 Mar 2018 15:57) (95% - 95%)      I&O's Detail    02 Mar 2018 07:01  -  03 Mar 2018 07:00  --------------------------------------------------------  IN:    IV PiggyBack: 400 mL    lactated ringers.: 600 mL  Total IN: 1000 mL    OUT:  Total OUT: 0 mL    Total NET: 1000 mL      03 Mar 2018 07:01  -  04 Mar 2018 02:20  --------------------------------------------------------  IN:  Total IN: 0 mL    OUT:    Indwelling Catheter - Urethral: 650 mL  Total OUT: 650 mL    Total NET: -650 mL          PHYSICAL EXAM:    GENERAL: NAD    HEENT: NCAT    CHEST/LUNGS: CTAB    HEART: RRR,  No murmurs, rubs, or gallops    ABDOMEN: Soft, incision cdi    EXTREMITIES:  FROM, No clubbing, cyanosis, or edema, palpable pulse        Diet, Regular (18 @ 17:30)    MEDICATIONS  (STANDING):  atorvastatin 10 milliGRAM(s) Oral at bedtime  digoxin     Tablet 0.25 milliGRAM(s) Oral daily  diltiazem    Tablet 90 milliGRAM(s) Enteral Tube every 6 hours  heparin  Injectable 5000 Unit(s) SubCutaneous every 8 hours  levETIRAcetam  IVPB 1000 milliGRAM(s) IV Intermittent every 12 hours  levothyroxine Injectable 25 MICROGram(s) IV Push at bedtime  metoprolol     tartrate 50 milliGRAM(s) Enteral Tube two times a day  pantoprazole  Injectable 40 milliGRAM(s) IV Push daily  sertraline 100 milliGRAM(s) Oral daily  vancomycin    Solution 125 milliGRAM(s) Oral every 6 hours    MEDICATIONS  (PRN):                              9.1    16.48 )-----------( 683      ( 03 Mar 2018 12:26 )             28.4                       140   |  105   |  5                  Ca: 8.0    BMP:   ----------------------------< 85     M.5   (18 @ 06:50)             4.1    |  19    | 0.8                Ph: 3.7        LFTs:    Coags:     >40.00  ----< 3.66    ( 03 Mar 2018 12:26 )     36.3        CARDIAC MARKERS ( 02 Mar 2018 21:00 )  0.06 ng/mL / x     / 208 U/L / x     / 1.8 ng/mL          Culture - Stool (collected 02 Mar 2018 03:50)  Source: .Stool Feces  Preliminary Report (03 Mar 2018 21:49):    No enteric pathogens to date: Final culture pending        IMAGING:    PATHOLOGY:      SPECTRA:

## 2018-03-04 NOTE — PROGRESS NOTE ADULT - ASSESSMENT
f/u am wbc and inr. possible d/c home today if wbc trending downwards and no other acute issues arise

## 2018-03-05 LAB
ALBUMIN SERPL ELPH-MCNC: 2.4 G/DL — LOW (ref 3–5.5)
ALP SERPL-CCNC: 73 U/L — SIGNIFICANT CHANGE UP (ref 30–115)
ALT FLD-CCNC: 10 U/L — SIGNIFICANT CHANGE UP (ref 0–41)
ANION GAP SERPL CALC-SCNC: 14 MMOL/L — SIGNIFICANT CHANGE UP (ref 7–14)
ANION GAP SERPL CALC-SCNC: 15 MMOL/L — HIGH (ref 7–14)
APTT BLD: 41.5 SEC — HIGH (ref 27–39.2)
AST SERPL-CCNC: 28 U/L — SIGNIFICANT CHANGE UP (ref 0–41)
BASOPHILS # BLD AUTO: 0.04 K/UL — SIGNIFICANT CHANGE UP (ref 0–0.2)
BASOPHILS # BLD AUTO: 0.05 K/UL — SIGNIFICANT CHANGE UP (ref 0–0.2)
BASOPHILS NFR BLD AUTO: 0.3 % — SIGNIFICANT CHANGE UP (ref 0–1)
BASOPHILS NFR BLD AUTO: 0.3 % — SIGNIFICANT CHANGE UP (ref 0–1)
BILIRUB DIRECT SERPL-MCNC: 0.3 MG/DL — HIGH (ref 0–0.2)
BILIRUB INDIRECT FLD-MCNC: 0.8 MG/DL — SIGNIFICANT CHANGE UP
BILIRUB SERPL-MCNC: 1.1 MG/DL — SIGNIFICANT CHANGE UP (ref 0.2–1.2)
BUN SERPL-MCNC: <5 MG/DL — LOW (ref 10–20)
BUN SERPL-MCNC: <5 MG/DL — LOW (ref 10–20)
CALCIUM SERPL-MCNC: 8.2 MG/DL — LOW (ref 8.5–10.1)
CALCIUM SERPL-MCNC: 8.2 MG/DL — LOW (ref 8.5–10.1)
CHLORIDE SERPL-SCNC: 104 MMOL/L — SIGNIFICANT CHANGE UP (ref 98–110)
CHLORIDE SERPL-SCNC: 105 MMOL/L — SIGNIFICANT CHANGE UP (ref 98–110)
CO2 SERPL-SCNC: 19 MMOL/L — SIGNIFICANT CHANGE UP (ref 17–32)
CO2 SERPL-SCNC: 19 MMOL/L — SIGNIFICANT CHANGE UP (ref 17–32)
CREAT SERPL-MCNC: 0.7 MG/DL — SIGNIFICANT CHANGE UP (ref 0.7–1.5)
CREAT SERPL-MCNC: 0.7 MG/DL — SIGNIFICANT CHANGE UP (ref 0.7–1.5)
CULTURE RESULTS: SIGNIFICANT CHANGE UP
DIGOXIN SERPL-MCNC: 1.9 NG/ML — SIGNIFICANT CHANGE UP (ref 0.8–2)
EOSINOPHIL # BLD AUTO: 0.14 K/UL — SIGNIFICANT CHANGE UP (ref 0–0.7)
EOSINOPHIL # BLD AUTO: 0.15 K/UL — SIGNIFICANT CHANGE UP (ref 0–0.7)
EOSINOPHIL NFR BLD AUTO: 0.9 % — SIGNIFICANT CHANGE UP (ref 0–8)
EOSINOPHIL NFR BLD AUTO: 1 % — SIGNIFICANT CHANGE UP (ref 0–8)
GLUCOSE SERPL-MCNC: 103 MG/DL — SIGNIFICANT CHANGE UP (ref 70–110)
GLUCOSE SERPL-MCNC: 124 MG/DL — HIGH (ref 70–110)
HCT VFR BLD CALC: 30.2 % — LOW (ref 37–47)
HCT VFR BLD CALC: 31.9 % — LOW (ref 37–47)
HGB BLD-MCNC: 10.4 G/DL — LOW (ref 12–16)
HGB BLD-MCNC: 9.9 G/DL — LOW (ref 12–16)
IMM GRANULOCYTES NFR BLD AUTO: 0.5 % — HIGH (ref 0.1–0.3)
IMM GRANULOCYTES NFR BLD AUTO: 0.5 % — HIGH (ref 0.1–0.3)
INR BLD: 3.91 RATIO — HIGH (ref 0.65–1.3)
LYMPHOCYTES # BLD AUTO: 1.23 K/UL — SIGNIFICANT CHANGE UP (ref 1.2–3.4)
LYMPHOCYTES # BLD AUTO: 1.53 K/UL — SIGNIFICANT CHANGE UP (ref 1.2–3.4)
LYMPHOCYTES # BLD AUTO: 10.3 % — LOW (ref 20.5–51.1)
LYMPHOCYTES # BLD AUTO: 8.3 % — LOW (ref 20.5–51.1)
MAGNESIUM SERPL-MCNC: 1.6 MG/DL — LOW (ref 1.8–2.4)
MAGNESIUM SERPL-MCNC: 1.7 MG/DL — LOW (ref 1.8–2.4)
MCHC RBC-ENTMCNC: 27 PG — SIGNIFICANT CHANGE UP (ref 27–31)
MCHC RBC-ENTMCNC: 27.2 PG — SIGNIFICANT CHANGE UP (ref 27–31)
MCHC RBC-ENTMCNC: 32.6 G/DL — SIGNIFICANT CHANGE UP (ref 32–37)
MCHC RBC-ENTMCNC: 32.8 G/DL — SIGNIFICANT CHANGE UP (ref 32–37)
MCV RBC AUTO: 82.3 FL — SIGNIFICANT CHANGE UP (ref 81–99)
MCV RBC AUTO: 83.3 FL — SIGNIFICANT CHANGE UP (ref 81–99)
MONOCYTES # BLD AUTO: 1.47 K/UL — HIGH (ref 0.1–0.6)
MONOCYTES # BLD AUTO: 1.54 K/UL — HIGH (ref 0.1–0.6)
MONOCYTES NFR BLD AUTO: 10.3 % — HIGH (ref 1.7–9.3)
MONOCYTES NFR BLD AUTO: 9.9 % — HIGH (ref 1.7–9.3)
NEUTROPHILS # BLD AUTO: 11.65 K/UL — HIGH (ref 1.4–6.5)
NEUTROPHILS # BLD AUTO: 11.86 K/UL — HIGH (ref 1.4–6.5)
NEUTROPHILS NFR BLD AUTO: 78 % — HIGH (ref 42.2–75.2)
NEUTROPHILS NFR BLD AUTO: 79.7 % — HIGH (ref 42.2–75.2)
PHOSPHATE SERPL-MCNC: 2.5 MG/DL — SIGNIFICANT CHANGE UP (ref 2.1–4.9)
PHOSPHATE SERPL-MCNC: 2.7 MG/DL — SIGNIFICANT CHANGE UP (ref 2.1–4.9)
PLATELET # BLD AUTO: 851 K/UL — HIGH (ref 130–400)
PLATELET # BLD AUTO: 869 K/UL — HIGH (ref 130–400)
POTASSIUM SERPL-MCNC: 3.6 MMOL/L — SIGNIFICANT CHANGE UP (ref 3.5–5)
POTASSIUM SERPL-MCNC: 3.7 MMOL/L — SIGNIFICANT CHANGE UP (ref 3.5–5)
POTASSIUM SERPL-SCNC: 3.6 MMOL/L — SIGNIFICANT CHANGE UP (ref 3.5–5)
POTASSIUM SERPL-SCNC: 3.7 MMOL/L — SIGNIFICANT CHANGE UP (ref 3.5–5)
PROT SERPL-MCNC: 5.3 G/DL — LOW (ref 6–8)
PROTHROM AB SERPL-ACNC: >40 SEC — HIGH (ref 9.95–12.87)
RBC # BLD: 3.67 M/UL — LOW (ref 4.2–5.4)
RBC # BLD: 3.83 M/UL — LOW (ref 4.2–5.4)
RBC # FLD: 22.6 % — HIGH (ref 11.5–14.5)
RBC # FLD: 23.4 % — HIGH (ref 11.5–14.5)
SODIUM SERPL-SCNC: 138 MMOL/L — SIGNIFICANT CHANGE UP (ref 135–146)
SODIUM SERPL-SCNC: 138 MMOL/L — SIGNIFICANT CHANGE UP (ref 135–146)
SPECIMEN SOURCE: SIGNIFICANT CHANGE UP
WBC # BLD: 14.9 K/UL — HIGH (ref 4.8–10.8)
WBC # BLD: 14.92 K/UL — HIGH (ref 4.8–10.8)
WBC # FLD AUTO: 14.9 K/UL — HIGH (ref 4.8–10.8)
WBC # FLD AUTO: 14.92 K/UL — HIGH (ref 4.8–10.8)

## 2018-03-05 RX ORDER — ONDANSETRON 8 MG/1
4 TABLET, FILM COATED ORAL ONCE
Qty: 0 | Refills: 0 | Status: COMPLETED | OUTPATIENT
Start: 2018-03-05 | End: 2018-03-05

## 2018-03-05 RX ORDER — POTASSIUM CHLORIDE 20 MEQ
20 PACKET (EA) ORAL
Qty: 0 | Refills: 0 | Status: COMPLETED | OUTPATIENT
Start: 2018-03-05 | End: 2018-03-05

## 2018-03-05 RX ORDER — ONDANSETRON 8 MG/1
4 TABLET, FILM COATED ORAL EVERY 6 HOURS
Qty: 0 | Refills: 0 | Status: DISCONTINUED | OUTPATIENT
Start: 2018-03-05 | End: 2018-03-13

## 2018-03-05 RX ORDER — PHYTONADIONE (VIT K1) 5 MG
5 TABLET ORAL ONCE
Qty: 0 | Refills: 0 | Status: DISCONTINUED | OUTPATIENT
Start: 2018-03-05 | End: 2018-03-05

## 2018-03-05 RX ORDER — PHYTONADIONE (VIT K1) 5 MG
5 TABLET ORAL ONCE
Qty: 0 | Refills: 0 | Status: COMPLETED | OUTPATIENT
Start: 2018-03-05 | End: 2018-03-05

## 2018-03-05 RX ORDER — MAGNESIUM SULFATE 500 MG/ML
2 VIAL (ML) INJECTION ONCE
Qty: 0 | Refills: 0 | Status: COMPLETED | OUTPATIENT
Start: 2018-03-05 | End: 2018-03-05

## 2018-03-05 RX ORDER — METRONIDAZOLE 500 MG
500 TABLET ORAL EVERY 8 HOURS
Qty: 0 | Refills: 0 | Status: DISCONTINUED | OUTPATIENT
Start: 2018-03-05 | End: 2018-03-09

## 2018-03-05 RX ADMIN — Medication 100 MILLIGRAM(S): at 13:41

## 2018-03-05 RX ADMIN — Medication 500 MILLIGRAM(S): at 22:19

## 2018-03-05 RX ADMIN — HEPARIN SODIUM 5000 UNIT(S): 5000 INJECTION INTRAVENOUS; SUBCUTANEOUS at 14:08

## 2018-03-05 RX ADMIN — ONDANSETRON 4 MILLIGRAM(S): 8 TABLET, FILM COATED ORAL at 13:45

## 2018-03-05 RX ADMIN — SODIUM CHLORIDE 50 MILLILITER(S): 9 INJECTION INTRAMUSCULAR; INTRAVENOUS; SUBCUTANEOUS at 23:59

## 2018-03-05 RX ADMIN — LEVETIRACETAM 400 MILLIGRAM(S): 250 TABLET, FILM COATED ORAL at 17:44

## 2018-03-05 RX ADMIN — PANTOPRAZOLE SODIUM 40 MILLIGRAM(S): 20 TABLET, DELAYED RELEASE ORAL at 13:43

## 2018-03-05 RX ADMIN — Medication 125 MILLIGRAM(S): at 20:18

## 2018-03-05 RX ADMIN — HEPARIN SODIUM 5000 UNIT(S): 5000 INJECTION INTRAVENOUS; SUBCUTANEOUS at 15:00

## 2018-03-05 RX ADMIN — HEPARIN SODIUM 5000 UNIT(S): 5000 INJECTION INTRAVENOUS; SUBCUTANEOUS at 22:16

## 2018-03-05 RX ADMIN — ATORVASTATIN CALCIUM 10 MILLIGRAM(S): 80 TABLET, FILM COATED ORAL at 22:16

## 2018-03-05 RX ADMIN — Medication 125 MILLIGRAM(S): at 13:44

## 2018-03-05 RX ADMIN — Medication 50 MILLIEQUIVALENT(S): at 18:31

## 2018-03-05 RX ADMIN — Medication 50 GRAM(S): at 18:31

## 2018-03-05 RX ADMIN — SERTRALINE 100 MILLIGRAM(S): 25 TABLET, FILM COATED ORAL at 14:24

## 2018-03-05 RX ADMIN — Medication 0.25 MILLIGRAM(S): at 15:27

## 2018-03-05 RX ADMIN — HEPARIN SODIUM 5000 UNIT(S): 5000 INJECTION INTRAVENOUS; SUBCUTANEOUS at 06:26

## 2018-03-05 RX ADMIN — Medication 5 MILLIGRAM(S): at 20:44

## 2018-03-05 RX ADMIN — Medication 50 MILLIGRAM(S): at 17:43

## 2018-03-05 NOTE — PROGRESS NOTE ADULT - ASSESSMENT
Patient is in rate control, abdomen is soft but mildly tender. patient continues to resist meds. plan for possible discharge today if patient remains in rate control and continues to have normal bowel movements

## 2018-03-05 NOTE — PROGRESS NOTE ADULT - SUBJECTIVE AND OBJECTIVE BOX
78y Female PAST MEDICAL & SURGICAL HISTORY:  Seizures  Dementia  High cholesterol  Hypothyroidism, unspecified type  Hypertension, unspecified type  Diabetes  Cerebrovascular accident (CVA), unspecified mechanism  Atrial fibrillation, unspecified type  H/O abdominal hysterectomy  S/P percutaneous endoscopic gastrostomy (PEG) tube placement: s/p removal of peg tube      Hospital Day: 4  Events of the Last 24h: patient refused PM meds last night, primary team was not made aware until this morning, patietn heart rate is still well controlled. however patient resistant to taking oral meds. no episodes fo diarrhea    Vital Signs Last 24 Hrs  T(C): 35.7 (05 Mar 2018 00:29), Max: 36.7 (04 Mar 2018 08:03)  T(F): 96.2 (05 Mar 2018 00:29), Max: 98 (04 Mar 2018 08:03)  HR: 62 (05 Mar 2018 00:29) (58 - 66)  BP: 185/81 (05 Mar 2018 00:29) (138/63 - 185/81)  BP(mean): --  RR: 16 (05 Mar 2018 00:29) (16 - 18)  SpO2: 95% (04 Mar 2018 16:00) (95% - 95%)      I&O's Detail    03 Mar 2018 07:01  -  04 Mar 2018 07:00  --------------------------------------------------------  IN:    IV PiggyBack: 100 mL  Total IN: 100 mL    OUT:    Indwelling Catheter - Urethral: 950 mL  Total OUT: 950 mL    Total NET: -850 mL      04 Mar 2018 07:01  -  05 Mar 2018 05:48  --------------------------------------------------------  IN:    IV PiggyBack: 100 mL  Total IN: 100 mL    OUT:    Indwelling Catheter - Urethral: 225 mL  Total OUT: 225 mL    Total NET: -125 mL          PHYSICAL EXAM:    GENERAL: NAD    HEENT: NCAT    CHEST/LUNGS: CTAB    HEART: RRR,  No murmurs, rubs, or gallops    ABDOMEN: SNTND +BS    EXTREMITIES:  FROM, No clubbing, cyanosis, or edema, palpable pulse    NEURO: No focal neurological deficits    SKIN: No rashes or lesions    INCISION/WOUNDS: CDI    Diet, Regular (18 @ 17:30)    MEDICATIONS  (STANDING):  atorvastatin 10 milliGRAM(s) Oral at bedtime  digoxin     Tablet 0.25 milliGRAM(s) Oral daily  diltiazem    Tablet 90 milliGRAM(s) Enteral Tube every 6 hours  heparin  Injectable 5000 Unit(s) SubCutaneous every 8 hours  levETIRAcetam  IVPB 1000 milliGRAM(s) IV Intermittent every 12 hours  levothyroxine Injectable 25 MICROGram(s) IV Push at bedtime  metoprolol     tartrate 50 milliGRAM(s) Enteral Tube two times a day  metroNIDAZOLE  IVPB 500 milliGRAM(s) IV Intermittent every 8 hours  pantoprazole  Injectable 40 milliGRAM(s) IV Push daily  sertraline 100 milliGRAM(s) Oral daily  sodium chloride 0.9%. 1000 milliLiter(s) (50 mL/Hr) IV Continuous <Continuous>  vancomycin    Solution 125 milliGRAM(s) Oral every 6 hours    MEDICATIONS  (PRN):                              9.4    17.86 )-----------( 819      ( 04 Mar 2018 10:26 )             29.5                       139   |  107   |  5                  Ca: 8.3    BMP:   ----------------------------< 125    M.9   (18 @ 10:26)             3.5    |  17    | 0.8                Ph: 3.1        LFTs:    Coags:     39.60  ----< 3.57    ( 04 Mar 2018 10:26 )     37.9        SPECTRA: [id number]

## 2018-03-06 LAB
ANION GAP SERPL CALC-SCNC: 10 MMOL/L — SIGNIFICANT CHANGE UP (ref 7–14)
BASOPHILS # BLD AUTO: 0.07 K/UL — SIGNIFICANT CHANGE UP (ref 0–0.2)
BASOPHILS NFR BLD AUTO: 0.5 % — SIGNIFICANT CHANGE UP (ref 0–1)
BUN SERPL-MCNC: <5 MG/DL — LOW (ref 10–20)
CALCIUM SERPL-MCNC: 8 MG/DL — LOW (ref 8.5–10.1)
CHLORIDE SERPL-SCNC: 110 MMOL/L — SIGNIFICANT CHANGE UP (ref 98–110)
CO2 SERPL-SCNC: 20 MMOL/L — SIGNIFICANT CHANGE UP (ref 17–32)
CREAT SERPL-MCNC: 0.6 MG/DL — LOW (ref 0.7–1.5)
EOSINOPHIL # BLD AUTO: 0.43 K/UL — SIGNIFICANT CHANGE UP (ref 0–0.7)
EOSINOPHIL NFR BLD AUTO: 3.2 % — SIGNIFICANT CHANGE UP (ref 0–8)
GLUCOSE SERPL-MCNC: 79 MG/DL — SIGNIFICANT CHANGE UP (ref 70–110)
HCT VFR BLD CALC: 29 % — LOW (ref 37–47)
HGB BLD-MCNC: 9.2 G/DL — LOW (ref 12–16)
IMM GRANULOCYTES NFR BLD AUTO: 0.6 % — HIGH (ref 0.1–0.3)
LYMPHOCYTES # BLD AUTO: 1.67 K/UL — SIGNIFICANT CHANGE UP (ref 1.2–3.4)
LYMPHOCYTES # BLD AUTO: 12.6 % — LOW (ref 20.5–51.1)
MAGNESIUM SERPL-MCNC: 1.9 MG/DL — SIGNIFICANT CHANGE UP (ref 1.8–2.4)
MCHC RBC-ENTMCNC: 27.2 PG — SIGNIFICANT CHANGE UP (ref 27–31)
MCHC RBC-ENTMCNC: 31.7 G/DL — LOW (ref 32–37)
MCV RBC AUTO: 85.8 FL — SIGNIFICANT CHANGE UP (ref 81–99)
MONOCYTES # BLD AUTO: 1.64 K/UL — HIGH (ref 0.1–0.6)
MONOCYTES NFR BLD AUTO: 12.4 % — HIGH (ref 1.7–9.3)
NEUTROPHILS # BLD AUTO: 9.36 K/UL — HIGH (ref 1.4–6.5)
NEUTROPHILS NFR BLD AUTO: 70.7 % — SIGNIFICANT CHANGE UP (ref 42.2–75.2)
NRBC # BLD: 0 /100 WBCS — SIGNIFICANT CHANGE UP (ref 0–0)
PHOSPHATE SERPL-MCNC: 3.1 MG/DL — SIGNIFICANT CHANGE UP (ref 2.1–4.9)
PLATELET # BLD AUTO: 792 K/UL — HIGH (ref 130–400)
POTASSIUM SERPL-MCNC: 3.9 MMOL/L — SIGNIFICANT CHANGE UP (ref 3.5–5)
POTASSIUM SERPL-SCNC: 3.9 MMOL/L — SIGNIFICANT CHANGE UP (ref 3.5–5)
RBC # BLD: 3.38 M/UL — LOW (ref 4.2–5.4)
RBC # FLD: 23.4 % — HIGH (ref 11.5–14.5)
SODIUM SERPL-SCNC: 140 MMOL/L — SIGNIFICANT CHANGE UP (ref 135–146)
WBC # BLD: 13.25 K/UL — HIGH (ref 4.8–10.8)
WBC # FLD AUTO: 13.25 K/UL — HIGH (ref 4.8–10.8)

## 2018-03-06 RX ORDER — METOPROLOL TARTRATE 50 MG
50 TABLET ORAL ONCE
Qty: 0 | Refills: 0 | Status: COMPLETED | OUTPATIENT
Start: 2018-03-06 | End: 2018-03-06

## 2018-03-06 RX ORDER — VANCOMYCIN HCL 1 G
500 VIAL (EA) INTRAVENOUS DAILY
Qty: 0 | Refills: 0 | Status: DISCONTINUED | OUTPATIENT
Start: 2018-03-06 | End: 2018-03-07

## 2018-03-06 RX ADMIN — Medication 125 MILLIGRAM(S): at 23:20

## 2018-03-06 RX ADMIN — Medication 125 MILLIGRAM(S): at 13:12

## 2018-03-06 RX ADMIN — Medication 500 MILLIGRAM(S): at 13:13

## 2018-03-06 RX ADMIN — LEVETIRACETAM 400 MILLIGRAM(S): 250 TABLET, FILM COATED ORAL at 17:56

## 2018-03-06 RX ADMIN — PANTOPRAZOLE SODIUM 40 MILLIGRAM(S): 20 TABLET, DELAYED RELEASE ORAL at 13:13

## 2018-03-06 RX ADMIN — ONDANSETRON 4 MILLIGRAM(S): 8 TABLET, FILM COATED ORAL at 11:30

## 2018-03-06 RX ADMIN — Medication 50 MILLIGRAM(S): at 07:00

## 2018-03-06 RX ADMIN — Medication 50 MILLIGRAM(S): at 23:19

## 2018-03-06 RX ADMIN — HEPARIN SODIUM 5000 UNIT(S): 5000 INJECTION INTRAVENOUS; SUBCUTANEOUS at 15:24

## 2018-03-06 RX ADMIN — HEPARIN SODIUM 5000 UNIT(S): 5000 INJECTION INTRAVENOUS; SUBCUTANEOUS at 06:06

## 2018-03-06 RX ADMIN — Medication 125 MILLIGRAM(S): at 06:04

## 2018-03-06 RX ADMIN — ONDANSETRON 4 MILLIGRAM(S): 8 TABLET, FILM COATED ORAL at 18:06

## 2018-03-06 RX ADMIN — Medication 25 MICROGRAM(S): at 23:20

## 2018-03-06 RX ADMIN — Medication 0.25 MILLIGRAM(S): at 06:06

## 2018-03-06 RX ADMIN — Medication 500 MILLIGRAM(S): at 11:00

## 2018-03-06 RX ADMIN — Medication 25 MICROGRAM(S): at 06:04

## 2018-03-06 RX ADMIN — SERTRALINE 100 MILLIGRAM(S): 25 TABLET, FILM COATED ORAL at 13:10

## 2018-03-06 RX ADMIN — Medication 500 MILLIGRAM(S): at 06:06

## 2018-03-06 RX ADMIN — LEVETIRACETAM 400 MILLIGRAM(S): 250 TABLET, FILM COATED ORAL at 06:04

## 2018-03-06 NOTE — CHART NOTE - NSCHARTNOTEFT_GEN_A_CORE
I was asked to eval the need for transfer of this pt to the medical service-I do not see any justification to transfer this patient who is close to being discharged, on day 16 of a hospitalization and whose condition has improved rather than deteriorated.

## 2018-03-06 NOTE — PROGRESS NOTE ADULT - SUBJECTIVE AND OBJECTIVE BOX
78y Female PAST MEDICAL & SURGICAL HISTORY:  Seizures  Dementia  High cholesterol  Hypothyroidism, unspecified type  Hypertension, unspecified type  Diabetes  Cerebrovascular accident (CVA), unspecified mechanism  Atrial fibrillation, unspecified type  H/O abdominal hysterectomy  S/P percutaneous endoscopic gastrostomy (PEG) tube placement: s/p removal of peg tube      Hospital Day: 5d  Post Operative Day: 16    Procedure: ex lap and dejuan for sbo    Events of the Last 24h: given dose of vitamin k for increasing INR despite stopping coumadin, taken off tele as HR under control. bm and diarrhea improving    Vital Signs Last 24 Hrs  T(C): 36.2 (06 Mar 2018 00:00), Max: 37.1 (05 Mar 2018 08:02)  T(F): 97.1 (06 Mar 2018 00:00), Max: 98.8 (05 Mar 2018 08:02)  HR: 61 (06 Mar 2018 00:00) (61 - 91)  BP: 145/61 (06 Mar 2018 00:00) (145/61 - 174/77)  BP(mean): 88 (06 Mar 2018 00:00) (88 - 88)  RR: 20 (06 Mar 2018 00:00) (18 - 20)  SpO2: 96% (05 Mar 2018 16:07) (96% - 97%)      I&O's Detail    04 Mar 2018 07:01  -  05 Mar 2018 07:00  --------------------------------------------------------  IN:    IV PiggyBack: 100 mL  Total IN: 100 mL    OUT:    Indwelling Catheter - Urethral: 875 mL  Total OUT: 875 mL    Total NET: -775 mL      05 Mar 2018 07:01  -  06 Mar 2018 05:28  --------------------------------------------------------  IN:    sodium chloride 0.9%.: 350 mL  Total IN: 350 mL    OUT:    Indwelling Catheter - Urethral: 600 mL  Total OUT: 600 mL    Total NET: -250 mL          PHYSICAL EXAM:    GENERAL: NAD    HEENT: NCAT    CHEST/LUNGS: CTAB    HEART: RRR,  No murmurs, rubs, or gallops    ABDOMEN: SNTND    EXTREMITIES:  FROM, No clubbing, cyanosis, or edema, palpable pulse    NEURO: No focal neurological deficits    SKIN: No rashes or lesions    INCISION/WOUNDS: CDI    Diet, Clear Liquid (18 @ 10:49)    MEDICATIONS  (STANDING):  atorvastatin 10 milliGRAM(s) Oral at bedtime  digoxin     Tablet 0.25 milliGRAM(s) Oral daily  diltiazem    Tablet 90 milliGRAM(s) Enteral Tube every 6 hours  heparin  Injectable 5000 Unit(s) SubCutaneous every 8 hours  levETIRAcetam  IVPB 1000 milliGRAM(s) IV Intermittent every 12 hours  levothyroxine Injectable 25 MICROGram(s) IV Push at bedtime  metoprolol     tartrate 50 milliGRAM(s) Enteral Tube two times a day  metroNIDAZOLE    Tablet 500 milliGRAM(s) Oral every 8 hours  pantoprazole  Injectable 40 milliGRAM(s) IV Push daily  potassium chloride  20 mEq/100 mL IVPB 20 milliEquivalent(s) IV Intermittent every 2 hours  sertraline 100 milliGRAM(s) Oral daily  sodium chloride 0.9%. 1000 milliLiter(s) (50 mL/Hr) IV Continuous <Continuous>  vancomycin    Solution 125 milliGRAM(s) Oral every 6 hours    MEDICATIONS  (PRN):  ondansetron Injectable 4 milliGRAM(s) IV Push every 6 hours PRN Nausea and/or Vomiting                              9.9    14.90 )-----------( 869      ( 05 Mar 2018 11:07 )             30.2                       138   |  105   |  <5                 Ca: 8.2    BMP:   ----------------------------< 124    M.6   (18 @ 11:07)             3.6    |  19    | 0.7                Ph: 2.5      LFT:     TPro: 5.3 / Alb: 2.4 / TBili: 1.1 / DBili: 0.3 / AST: 28 / ALT: 10 / AlkPhos: 73   (18 @ 11:08)    LFTs:             5.3  | 1.1  | 28       ------------------[73      ( 05 Mar 2018 11:08 )  2.4  | 0.3  | 10          Lipase:x      Amylase:x        Coags:     >40.00  ----< 3.91    ( 05 Mar 2018 11:06 )     41.5        SPECTRA: [id number]

## 2018-03-06 NOTE — CHART NOTE - NSCHARTNOTEFT_GEN_A_CORE
GENERAL SURGERY FLOOR TRANSFER NOTE    78y [id number] transferred to medicine from trauma service.    SUBJECTIVE:   77 yo F w/ PMH/PSH: afib on coumadin, CVA, dementia, DM, HLD, seizure, hypothyroidism. S/p recent ex lap, [place] for SBO 2 weeks ago with complication of i/a mesenteric hematoma (stable). Was doing well after surgery and discharged on Feb 26 home. 1 day ago started to have diarrhea, nonbloody, no fever, no n/v, but abdominal distention. Returned to the hospital with abdominal pain and diarrhea. NG tube was placed and removed the same day. C diff was positive, the patient was started on cipro and flagyl. She is currently tolerating a clear liquid diet. The patient has atrial fibrillation, is on digoxin, metoprolol and cardizem for rate control. Cardiology has been consulted regarding discontinuing Digoxin. 3/5/18 vitamin k was given to reverse INR.    SBO(SMALL BOWEL OBSTRUCTION)  ^ABD PAIN  No pertinent family history in first degree relatives  Handoff  MEWS Score  Seizures  Dementia  High cholesterol  Hypothyroidism, unspecified type  Hypertension, unspecified type  Diabetes  Cerebrovascular accident (CVA), unspecified mechanism  Atrial fibrillation, unspecified type  SBO (small bowel obstruction)  H/O abdominal hysterectomy  S/P percutaneous endoscopic gastrostomy (PEG) tube placement  ABD PAIN  3    No Known Allergies    atorvastatin 10 milliGRAM(s) Oral at bedtime  digoxin     Tablet 0.25 milliGRAM(s) Oral daily  diltiazem    Tablet 90 milliGRAM(s) Enteral Tube every 6 hours  heparin  Injectable 5000 Unit(s) SubCutaneous every 8 hours  levETIRAcetam  IVPB 1000 milliGRAM(s) IV Intermittent every 12 hours  levothyroxine Injectable 25 MICROGram(s) IV Push at bedtime  metoprolol     tartrate 50 milliGRAM(s) Enteral Tube two times a day  metroNIDAZOLE    Tablet 500 milliGRAM(s) Oral every 8 hours  ondansetron Injectable 4 milliGRAM(s) IV Push every 6 hours PRN  pantoprazole  Injectable 40 milliGRAM(s) IV Push daily  potassium chloride  20 mEq/100 mL IVPB 20 milliEquivalent(s) IV Intermittent every 2 hours  sertraline 100 milliGRAM(s) Oral daily  sodium chloride 0.9%. 1000 milliLiter(s) IV Continuous <Continuous>  vancomycin    Solution 125 milliGRAM(s) Oral every 6 hours  vancomycin  Retention Enema 500 milliGRAM(s) Rectal daily      OBJECTIVE:    T(C): 36.1 (03-06-18 @ 07:48), Max: 36.4 (03-06-18 @ 05:58)  HR: 66 (03-06-18 @ 09:00) (61 - 73)  BP: 145/66 (03-06-18 @ 09:00) (145/61 - 188/79)  RR: 20 (03-06-18 @ 07:48) (18 - 20)  SpO2: 96% (03-06-18 @ 07:48) (96% - 96%)  Wt(kg): --      I&O's Summary    05 Mar 2018 07:01  -  06 Mar 2018 07:00  --------------------------------------------------------  IN: 450 mL / OUT: 700 mL / NET: -250 mL                              9.9    14.90 )-----------( 869      ( 05 Mar 2018 11:07 )             30.2       03-05    138  |  105  |  <5<L>  ----------------------------<  124<H>  3.6   |  19  |  0.7    Ca    8.2<L>      05 Mar 2018 11:07  Phos  2.5     03-05  Mg     1.6     03-05    TPro  5.3<L>  /  Alb  2.4<L>  /  TBili  1.1  /  DBili  0.3<H>  /  AST  28  /  ALT  10  /  AlkPhos  73  03-05      MEDICATIONS  (STANDING):  atorvastatin 10 milliGRAM(s) Oral at bedtime  digoxin     Tablet 0.25 milliGRAM(s) Oral daily  diltiazem    Tablet 90 milliGRAM(s) Enteral Tube every 6 hours  heparin  Injectable 5000 Unit(s) SubCutaneous every 8 hours  levETIRAcetam  IVPB 1000 milliGRAM(s) IV Intermittent every 12 hours  levothyroxine Injectable 25 MICROGram(s) IV Push at bedtime  metoprolol     tartrate 50 milliGRAM(s) Enteral Tube two times a day  metroNIDAZOLE    Tablet 500 milliGRAM(s) Oral every 8 hours  pantoprazole  Injectable 40 milliGRAM(s) IV Push daily  potassium chloride  20 mEq/100 mL IVPB 20 milliEquivalent(s) IV Intermittent every 2 hours  sertraline 100 milliGRAM(s) Oral daily  sodium chloride 0.9%. 1000 milliLiter(s) (50 mL/Hr) IV Continuous <Continuous>  vancomycin    Solution 125 milliGRAM(s) Oral every 6 hours  vancomycin  Retention Enema 500 milliGRAM(s) Rectal daily    MEDICATIONS  (PRN):  ondansetron Injectable 4 milliGRAM(s) IV Push every 6 hours PRN Nausea and/or Vomiting        PHYSICAL EXAM:      GENERAL: NAD    HEENT: NCAT    CHEST/LUNGS: CTAB    HEART: RRR,  No murmurs, rubs, or gallops    ABDOMEN: SNTND    EXTREMITIES:  FROM, No clubbing, cyanosis, or edema, palpable pulse    NEURO: No focal neurological deficits    SKIN: No rashes or lesions    INCISION/WOUNDS: CDI    Diet, Clear Liquid (03-05-18 @ 10:49)        Activated Partial Thromboplastin Time:  Start Date:05-Mar-2018. AM Sched. Collection:06-Mar-2018 04:30 (03-05-18 @ 16:11)  Prothrombin Time and INR, Plasma:  Start Date:05-Mar-2018. AM Sched. Collection:06-Mar-2018 04:30 (03-05-18 @ 16:12)    Plan :   Follow up Cardiology recommendations regarding Digoxin  Follow up INR  Continue antibiotics for C. Diff. GENERAL SURGERY FLOOR TRANSFER NOTE    78y [id number] transferred to medicine from trauma service.    SUBJECTIVE:   77 yo F w/ PMH/PSH: afib on coumadin, CVA, dementia, DM, HLD, seizure, hypothyroidism. S/p recent ex lap, [place] for SBO 2 weeks ago with complication of i/a mesenteric hematoma (stable). Was doing well after surgery and discharged on Feb 26 home. 1 day ago started to have diarrhea, nonbloody, no fever, no n/v, but abdominal distention. Returned to the hospital with abdominal pain and diarrhea. NG tube was placed and removed the same day. C diff was positive, the patient was started on cipro and flagyl. She is currently tolerating a clear liquid diet. The patient has atrial fibrillation, is on digoxin, metoprolol and cardizem for rate control. Cardiology has been consulted regarding discontinuing Digoxin. 3/5/18 vitamin k was given to reverse INR.    SBO(SMALL BOWEL OBSTRUCTION)  ^ABD PAIN  No pertinent family history in first degree relatives  Handoff  MEWS Score  Seizures  Dementia  High cholesterol  Hypothyroidism, unspecified type  Hypertension, unspecified type  Diabetes  Cerebrovascular accident (CVA), unspecified mechanism  Atrial fibrillation, unspecified type  SBO (small bowel obstruction)  H/O abdominal hysterectomy  S/P percutaneous endoscopic gastrostomy (PEG) tube placement  ABD PAIN  3    No Known Allergies    atorvastatin 10 milliGRAM(s) Oral at bedtime  digoxin     Tablet 0.25 milliGRAM(s) Oral daily  diltiazem    Tablet 90 milliGRAM(s) Enteral Tube every 6 hours  heparin  Injectable 5000 Unit(s) SubCutaneous every 8 hours  levETIRAcetam  IVPB 1000 milliGRAM(s) IV Intermittent every 12 hours  levothyroxine Injectable 25 MICROGram(s) IV Push at bedtime  metoprolol     tartrate 50 milliGRAM(s) Enteral Tube two times a day  metroNIDAZOLE    Tablet 500 milliGRAM(s) Oral every 8 hours  ondansetron Injectable 4 milliGRAM(s) IV Push every 6 hours PRN  pantoprazole  Injectable 40 milliGRAM(s) IV Push daily  potassium chloride  20 mEq/100 mL IVPB 20 milliEquivalent(s) IV Intermittent every 2 hours  sertraline 100 milliGRAM(s) Oral daily  sodium chloride 0.9%. 1000 milliLiter(s) IV Continuous <Continuous>  vancomycin    Solution 125 milliGRAM(s) Oral every 6 hours  vancomycin  Retention Enema 500 milliGRAM(s) Rectal daily      OBJECTIVE:    T(C): 36.1 (03-06-18 @ 07:48), Max: 36.4 (03-06-18 @ 05:58)  HR: 66 (03-06-18 @ 09:00) (61 - 73)  BP: 145/66 (03-06-18 @ 09:00) (145/61 - 188/79)  RR: 20 (03-06-18 @ 07:48) (18 - 20)  SpO2: 96% (03-06-18 @ 07:48) (96% - 96%)  Wt(kg): --      I&O's Summary    05 Mar 2018 07:01  -  06 Mar 2018 07:00  --------------------------------------------------------  IN: 450 mL / OUT: 700 mL / NET: -250 mL                              9.9    14.90 )-----------( 869      ( 05 Mar 2018 11:07 )             30.2       03-05    138  |  105  |  <5<L>  ----------------------------<  124<H>  3.6   |  19  |  0.7    Ca    8.2<L>      05 Mar 2018 11:07  Phos  2.5     03-05  Mg     1.6     03-05    TPro  5.3<L>  /  Alb  2.4<L>  /  TBili  1.1  /  DBili  0.3<H>  /  AST  28  /  ALT  10  /  AlkPhos  73  03-05      MEDICATIONS  (STANDING):  atorvastatin 10 milliGRAM(s) Oral at bedtime  digoxin     Tablet 0.25 milliGRAM(s) Oral daily  diltiazem    Tablet 90 milliGRAM(s) Enteral Tube every 6 hours  heparin  Injectable 5000 Unit(s) SubCutaneous every 8 hours  levETIRAcetam  IVPB 1000 milliGRAM(s) IV Intermittent every 12 hours  levothyroxine Injectable 25 MICROGram(s) IV Push at bedtime  metoprolol     tartrate 50 milliGRAM(s) Enteral Tube two times a day  metroNIDAZOLE    Tablet 500 milliGRAM(s) Oral every 8 hours  pantoprazole  Injectable 40 milliGRAM(s) IV Push daily  potassium chloride  20 mEq/100 mL IVPB 20 milliEquivalent(s) IV Intermittent every 2 hours  sertraline 100 milliGRAM(s) Oral daily  sodium chloride 0.9%. 1000 milliLiter(s) (50 mL/Hr) IV Continuous <Continuous>  vancomycin    Solution 125 milliGRAM(s) Oral every 6 hours  vancomycin  Retention Enema 500 milliGRAM(s) Rectal daily    MEDICATIONS  (PRN):  ondansetron Injectable 4 milliGRAM(s) IV Push every 6 hours PRN Nausea and/or Vomiting        PHYSICAL EXAM:      GENERAL: NAD    HEENT: NCAT    CHEST/LUNGS: CTAB    HEART: RRR,  No murmurs, rubs, or gallops    ABDOMEN: SNTND    EXTREMITIES:  FROM, No clubbing, cyanosis, or edema, palpable pulse    NEURO: No focal neurological deficits    SKIN: No rashes or lesions    INCISION/WOUNDS: CDI    Diet, Clear Liquid (03-05-18 @ 10:49)        Activated Partial Thromboplastin Time:  Start Date:05-Mar-2018. AM Sched. Collection:06-Mar-2018 04:30 (03-05-18 @ 16:11)  Prothrombin Time and INR, Plasma:  Start Date:05-Mar-2018. AM Sched. Collection:06-Mar-2018 04:30 (03-05-18 @ 16:12)    Plan :   Follow up Cardiology recommendations regarding Digoxin  Follow up INR  Continue antibiotics for C. Diff.    Sign out given to Dr. Ramires

## 2018-03-07 DIAGNOSIS — Z98.890 OTHER SPECIFIED POSTPROCEDURAL STATES: Chronic | ICD-10-CM

## 2018-03-07 DIAGNOSIS — K56.609 UNSPECIFIED INTESTINAL OBSTRUCTION, UNSPECIFIED AS TO PARTIAL VERSUS COMPLETE OBSTRUCTION: Chronic | ICD-10-CM

## 2018-03-07 DIAGNOSIS — S36.892A CONTUSION OF OTHER INTRA-ABDOMINAL ORGANS, INITIAL ENCOUNTER: Chronic | ICD-10-CM

## 2018-03-07 LAB
ANION GAP SERPL CALC-SCNC: 7 MMOL/L — SIGNIFICANT CHANGE UP (ref 7–14)
BASOPHILS # BLD AUTO: 0.08 K/UL — SIGNIFICANT CHANGE UP (ref 0–0.2)
BASOPHILS NFR BLD AUTO: 0.6 % — SIGNIFICANT CHANGE UP (ref 0–1)
BUN SERPL-MCNC: <5 MG/DL — LOW (ref 10–20)
CALCIUM SERPL-MCNC: 8 MG/DL — LOW (ref 8.5–10.1)
CHLORIDE SERPL-SCNC: 108 MMOL/L — SIGNIFICANT CHANGE UP (ref 98–110)
CO2 SERPL-SCNC: 23 MMOL/L — SIGNIFICANT CHANGE UP (ref 17–32)
CREAT SERPL-MCNC: 0.7 MG/DL — SIGNIFICANT CHANGE UP (ref 0.7–1.5)
EOSINOPHIL # BLD AUTO: 0.31 K/UL — SIGNIFICANT CHANGE UP (ref 0–0.7)
EOSINOPHIL NFR BLD AUTO: 2.5 % — SIGNIFICANT CHANGE UP (ref 0–8)
GLUCOSE SERPL-MCNC: 102 MG/DL — SIGNIFICANT CHANGE UP (ref 70–110)
HCT VFR BLD CALC: 31 % — LOW (ref 37–47)
HGB BLD-MCNC: 9.9 G/DL — LOW (ref 12–16)
IMM GRANULOCYTES NFR BLD AUTO: 0.8 % — HIGH (ref 0.1–0.3)
INR BLD: 1.07 RATIO — SIGNIFICANT CHANGE UP (ref 0.65–1.3)
LYMPHOCYTES # BLD AUTO: 1.55 K/UL — SIGNIFICANT CHANGE UP (ref 1.2–3.4)
LYMPHOCYTES # BLD AUTO: 12.3 % — LOW (ref 20.5–51.1)
MAGNESIUM SERPL-MCNC: 1.5 MG/DL — LOW (ref 1.8–2.4)
MCHC RBC-ENTMCNC: 26.8 PG — LOW (ref 27–31)
MCHC RBC-ENTMCNC: 31.9 G/DL — LOW (ref 32–37)
MCV RBC AUTO: 83.8 FL — SIGNIFICANT CHANGE UP (ref 81–99)
MONOCYTES # BLD AUTO: 1.42 K/UL — HIGH (ref 0.1–0.6)
MONOCYTES NFR BLD AUTO: 11.2 % — HIGH (ref 1.7–9.3)
NEUTROPHILS # BLD AUTO: 9.17 K/UL — HIGH (ref 1.4–6.5)
NEUTROPHILS NFR BLD AUTO: 72.6 % — SIGNIFICANT CHANGE UP (ref 42.2–75.2)
PLATELET # BLD AUTO: 829 K/UL — HIGH (ref 130–400)
POTASSIUM SERPL-MCNC: 3.6 MMOL/L — SIGNIFICANT CHANGE UP (ref 3.5–5)
POTASSIUM SERPL-SCNC: 3.6 MMOL/L — SIGNIFICANT CHANGE UP (ref 3.5–5)
PROTHROM AB SERPL-ACNC: 11.6 SEC — SIGNIFICANT CHANGE UP (ref 9.95–12.87)
RBC # BLD: 3.7 M/UL — LOW (ref 4.2–5.4)
RBC # FLD: 23.8 % — HIGH (ref 11.5–14.5)
SODIUM SERPL-SCNC: 138 MMOL/L — SIGNIFICANT CHANGE UP (ref 135–146)
WBC # BLD: 12.63 K/UL — HIGH (ref 4.8–10.8)
WBC # FLD AUTO: 12.63 K/UL — HIGH (ref 4.8–10.8)

## 2018-03-07 RX ORDER — ALPRAZOLAM 0.25 MG
0.5 TABLET ORAL ONCE
Qty: 0 | Refills: 0 | Status: DISCONTINUED | OUTPATIENT
Start: 2018-03-07 | End: 2018-03-07

## 2018-03-07 RX ORDER — WARFARIN SODIUM 2.5 MG/1
5 TABLET ORAL ONCE
Qty: 0 | Refills: 0 | Status: COMPLETED | OUTPATIENT
Start: 2018-03-07 | End: 2018-03-07

## 2018-03-07 RX ADMIN — HEPARIN SODIUM 5000 UNIT(S): 5000 INJECTION INTRAVENOUS; SUBCUTANEOUS at 06:14

## 2018-03-07 RX ADMIN — Medication 125 MILLIGRAM(S): at 14:07

## 2018-03-07 RX ADMIN — Medication 0.25 MILLIGRAM(S): at 06:13

## 2018-03-07 RX ADMIN — Medication 125 MILLIGRAM(S): at 17:32

## 2018-03-07 RX ADMIN — Medication 50 MILLIGRAM(S): at 06:13

## 2018-03-07 RX ADMIN — Medication 500 MILLIGRAM(S): at 14:07

## 2018-03-07 RX ADMIN — Medication 500 MILLIGRAM(S): at 06:14

## 2018-03-07 RX ADMIN — Medication 25 MICROGRAM(S): at 21:33

## 2018-03-07 RX ADMIN — Medication 50 MILLIGRAM(S): at 17:33

## 2018-03-07 RX ADMIN — LEVETIRACETAM 400 MILLIGRAM(S): 250 TABLET, FILM COATED ORAL at 06:14

## 2018-03-07 RX ADMIN — HEPARIN SODIUM 5000 UNIT(S): 5000 INJECTION INTRAVENOUS; SUBCUTANEOUS at 14:06

## 2018-03-07 RX ADMIN — Medication 125 MILLIGRAM(S): at 06:14

## 2018-03-07 RX ADMIN — LEVETIRACETAM 400 MILLIGRAM(S): 250 TABLET, FILM COATED ORAL at 17:32

## 2018-03-07 RX ADMIN — PANTOPRAZOLE SODIUM 40 MILLIGRAM(S): 20 TABLET, DELAYED RELEASE ORAL at 17:23

## 2018-03-07 RX ADMIN — SERTRALINE 100 MILLIGRAM(S): 25 TABLET, FILM COATED ORAL at 12:27

## 2018-03-07 RX ADMIN — HEPARIN SODIUM 5000 UNIT(S): 5000 INJECTION INTRAVENOUS; SUBCUTANEOUS at 21:35

## 2018-03-07 NOTE — SWALLOW BEDSIDE ASSESSMENT ADULT - SWALLOW EVAL: RECOMMENDED FEEDING/EATING TECHNIQUES
allow for swallow between intakes/check mouth frequently for oral residue/pocketing/crush medication (when feasible)/small sips/bites

## 2018-03-07 NOTE — PROGRESS NOTE ADULT - ASSESSMENT
78y female with PMH listed and recent ex lap, [facility] for SBO 2 weeks ptp with complication of i/a mesenteric hematoma (stable), pw cc diarrhea.  Was admitted to surgery for C diff colitis.  Transferred now to medicine after surgery team deemed patient stable for discharge.     Pt has not been tolerating oral diet well throughout hospitalization and currently c/o nausea and possible dysphagia.       PLAN :    POOR ORAL INTAKE / DYSPHAGIA SINCE HOSPITALIZATION  --  s/s eval  --  GI eval (Dr. Briggs) per pt/fam request. Pt previously had PEG by Dr. 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  Was doing well and tolerating oral feedings prior to her nausea/diarrhea / hospitalization.        C DIFF COLITIS  --  vanco (day 6), flagyl (day 3)        AF (RATE CONTROLLED)  --  cardio:  Afib with CHADs Vasc ~7 recommend resuming a/c once cleared by the surgical team  Check digoxin levels >> 1.9  will follow  --  surg:  Spoke with trauma surg [id number] re: a/c.  states pt already cleared from surgery re: a/c.       SEIZURES / DEMENTIA / DL / HYPOTHYROID / HTN / DM / CVA W RESIDUAL L HEMIPARESIS  --  cw home regimen >>  pending s/s eval,  will switch to iv if necessary.   --  check fs, holding oral dm rx, and start insulin sliding scale if necessary.       DVT PPX  CODE STATUS: DNR / DNI (clarified with family/pt today,  forms in chart now) 78y female with PMH listed and recent ex lap, [facility] for SBO 2 weeks ptp with complication of i/a mesenteric hematoma (stable), pw cc diarrhea.  Was admitted to surgery for C diff colitis.  Transferred now to medicine after surgery team deemed patient stable for discharge.     Pt has not been tolerating oral diet well throughout hospitalization and currently c/o nausea and possible dysphagia.       PLAN :    POOR ORAL INTAKE / DYSPHAGIA SINCE HOSPITALIZATION  --  s/s eval  --  GI eval (Dr. Briggs) per pt/fam request. Pt previously had PEG by Dr. 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  Was doing well and tolerating oral feedings prior to her nausea/diarrhea / hospitalization.        C DIFF COLITIS  --  vanco (day 6), flagyl (day 3)        AF (RATE CONTROLLED)  --  cardio:  Afib with CHADs Vasc ~7 recommend resuming a/c once cleared by the surgical team  Check digoxin levels >> 1.9  will follow  --  surg:  Spoke with trauma surg [phone] re: a/c.  states pt already cleared from surgery re: a/c.   --  check daily INR (last checked 3/5) and re-start coumadin once within therapeutic range.   >> will check 11am labs today.       SEIZURES / DEMENTIA / DL / HYPOTHYROID / HTN / DM / CVA W RESIDUAL L HEMIPARESIS  --  cw home regimen >>  pending s/s eval,  will switch to iv if necessary.   --  check fs, holding oral dm rx, and start insulin sliding scale if necessary.       DVT PPX  CODE STATUS: DNR / DNI (clarified with family/pt today,  forms in chart now) 78y female with PMH listed and recent ex lap, [facility] for SBO 2 weeks ptp with complication of i/a mesenteric hematoma (stable), pw cc diarrhea.  Was admitted to surgery for C diff colitis.  Transferred now to medicine after surgery team deemed patient stable for discharge.     Pt has not been tolerating oral diet well throughout hospitalization and currently c/o nausea and possible dysphagia.       PLAN :    POOR ORAL INTAKE / DYSPHAGIA SINCE HOSPITALIZATION  --  s/s eval  --  GI eval (Dr. Briggs) per pt/fam request. Pt previously had PEG by Dr. 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  Was doing well and tolerating oral feedings prior to her nausea/diarrhea / hospitalization.        C DIFF COLITIS  --  vanco (day 6), flagyl (day 3)        AF (RATE CONTROLLED)  --  cardio:  Afib with CHADs Vasc ~7 recommend resuming a/c once cleared by the surgical team  Check digoxin levels >> 1.9  will follow  --  surg:  Spoke with trauma surg [phone] re: a/c.  states pt already cleared from surgery re: a/c.   --  check daily INR (last checked 3/5) and re-start coumadin once within therapeutic range.   >> will check 11am labs today.       SEIZURES / DEMENTIA / DL / HYPOTHYROID / HTN / DM / CVA W RESIDUAL L HEMIPARESIS  --  cw home regimen >>  pending s/s eval, on iv forms of home rx  --  check fs, holding oral dm rx, and start insulin sliding scale if necessary.       DVT PPX  CODE STATUS: DNR / DNI (clarified with family/pt today,  forms in chart now)

## 2018-03-07 NOTE — PROGRESS NOTE ADULT - SUBJECTIVE AND OBJECTIVE BOX
IRA PACKER 78y Female   MRN#: [id number]    Patient is a 78y old Female who presents with a chief complaint of abdominal distention (02 Mar 2018 03:12)    Was admitted to surgery since admission for C diff colitis after recent ex lap, [facility] for SBO 2 weeks ptp with complication of i/a mesenteric hematoma (stable).     Transferred now to medicine from surgery against hospitalist service advice (so under private attending instead) after surgery team deemed patient stable for discharge.        Today is hospital day 5d.  Pt is resting comfortably.  Does not recall having diarrhea.   Currently does not want to eat d/t fear of nausea and food getting stuck in throat.          PAST MEDICAL & SURGICAL HISTORY  Seizures  Dementia  High cholesterol  Hypothyroidism, unspecified type  Hypertension, unspecified type  Diabetes  Cerebrovascular accident (CVA), unspecified mechanism  Atrial fibrillation, unspecified type  H/O abdominal hysterectomy  S/P percutaneous endoscopic gastrostomy (PEG) tube placement: s/p removal of peg tube      ALLERGIES:  No Known Allergies      MEDICATIONS:  STANDING MEDICATIONS  atorvastatin 10 milliGRAM(s) Oral at bedtime  digoxin     Tablet 0.25 milliGRAM(s) Oral daily  diltiazem    Tablet 90 milliGRAM(s) Enteral Tube every 6 hours  heparin  Injectable 5000 Unit(s) SubCutaneous every 8 hours  levETIRAcetam  IVPB 1000 milliGRAM(s) IV Intermittent every 12 hours  levothyroxine Injectable 25 MICROGram(s) IV Push at bedtime  metoprolol     tartrate 50 milliGRAM(s) Enteral Tube two times a day  metroNIDAZOLE    Tablet 500 milliGRAM(s) Oral every 8 hours  pantoprazole  Injectable 40 milliGRAM(s) IV Push daily  sertraline 100 milliGRAM(s) Oral daily  sodium chloride 0.9%. 1000 milliLiter(s) IV Continuous <Continuous>  vancomycin    Solution 125 milliGRAM(s) Oral every 6 hours  vancomycin  Retention Enema 500 milliGRAM(s) Rectal daily    PRN MEDICATIONS  ondansetron Injectable 4 milliGRAM(s) IV Push every 6 hours PRN      VITALS:   T(F): 97.7, Max: 99.1 (03-06-18 @ 15:25)  HR: 91  BP: 187/86  RR: 16  SpO2: 96%    LABS:                        9.2    13.25 )-----------( 792      ( 06 Mar 2018 06:57 )             29.0     03-06    140  |  110  |  <5<L>  ----------------------------<  79  3.9   |  20  |  0.6<L>    Ca    8.0<L>      06 Mar 2018 06:57  Phos  3.1     03-06  Mg     1.9     03-06    TPro  5.3<L>  /  Alb  2.4<L>  /  TBili  1.1  /  DBili  0.3<H>  /  AST  28  /  ALT  10  /  AlkPhos  73  03-05    PT/INR - ( 05 Mar 2018 11:06 )   PT: >40.00 sec;   INR: 3.91 ratio         PTT - ( 05 Mar 2018 11:06 )  PTT:41.5 sec                RADIOLOGY:      PHYSICAL EXAM:  Constitutional: non-toxic,  not in acute distress  HEENT: eomi,  wnl  Respiratory:  clear lung sounds b/l;   Cardiovascular:  s1, s2,  regular, no murmurs  Gastrointestinal:  nontender, nondistended, +bowel sounds  Extremities: no edema b/l le  Neurological: nonfocal; wnl, aaox3 IRA PACKER 78y Female   MRN#: [id number]    Patient is a 78y old Female who presents with a chief complaint of abdominal distention (02 Mar 2018 03:12)    Was admitted to surgery since admission for C diff colitis after recent ex lap, [facility] for SBO 2 weeks ptp with complication of i/a mesenteric hematoma (stable).     Transferred now to medicine from surgery against hospitalist service advice (so under private attending instead) after surgery team deemed patient stable for discharge.        Today is hospital day 5d.  Pt is resting comfortably.  Does not recall having diarrhea.   Currently does not want to eat d/t fear of nausea and food getting stuck in throat.          PAST MEDICAL & SURGICAL HISTORY  Seizures  Dementia  High cholesterol  Hypothyroidism, unspecified type  Hypertension, unspecified type  Diabetes  Cerebrovascular accident (CVA), unspecified mechanism  Atrial fibrillation, unspecified type  H/O abdominal hysterectomy  S/P percutaneous endoscopic gastrostomy (PEG) tube placement: s/p removal of peg tube      ALLERGIES:  No Known Allergies      MEDICATIONS:  STANDING MEDICATIONS  atorvastatin 10 milliGRAM(s) Oral at bedtime  digoxin     Tablet 0.25 milliGRAM(s) Oral daily  diltiazem    Tablet 90 milliGRAM(s) Enteral Tube every 6 hours  heparin  Injectable 5000 Unit(s) SubCutaneous every 8 hours  levETIRAcetam  IVPB 1000 milliGRAM(s) IV Intermittent every 12 hours  levothyroxine Injectable 25 MICROGram(s) IV Push at bedtime  metoprolol     tartrate 50 milliGRAM(s) Enteral Tube two times a day  metroNIDAZOLE    Tablet 500 milliGRAM(s) Oral every 8 hours  pantoprazole  Injectable 40 milliGRAM(s) IV Push daily  sertraline 100 milliGRAM(s) Oral daily  sodium chloride 0.9%. 1000 milliLiter(s) IV Continuous <Continuous>  vancomycin    Solution 125 milliGRAM(s) Oral every 6 hours  vancomycin  Retention Enema 500 milliGRAM(s) Rectal daily    PRN MEDICATIONS  ondansetron Injectable 4 milliGRAM(s) IV Push every 6 hours PRN      VITALS:   T(F): 97.7, Max: 99.1 (03-06-18 @ 15:25)  HR: 91  BP: 187/86  RR: 16  SpO2: 96%    LABS:                        9.2    13.25 )-----------( 792      ( 06 Mar 2018 06:57 )             29.0     03-06    140  |  110  |  <5<L>  ----------------------------<  79  3.9   |  20  |  0.6<L>    Ca    8.0<L>      06 Mar 2018 06:57  Phos  3.1     03-06  Mg     1.9     03-06    TPro  5.3<L>  /  Alb  2.4<L>  /  TBili  1.1  /  DBili  0.3<H>  /  AST  28  /  ALT  10  /  AlkPhos  73  03-05    PT/INR - ( 05 Mar 2018 11:06 )   PT: >40.00 sec;   INR: 3.91 ratio         PTT - ( 05 Mar 2018 11:06 )  PTT:41.5 sec      >> labs not ordered for today by previous team.   will order for 11am today.            RADIOLOGY:      PHYSICAL EXAM:  Constitutional: non-toxic,  not in acute distress  HEENT: eomi,  wnl  Respiratory:  scattered crackles b/l   Cardiovascular:  s1, s2,  irregularly irregular  Gastrointestinal:  nontender, nondistended, +bowel sounds,  unknown if bm (patient and family at bedside cannot recall)  Extremities: no edema b/l le;  L hemiparesis (baseline)  Neurological: L hemiparesis (baseline);

## 2018-03-07 NOTE — DIETITIAN INITIAL EVALUATION ADULT. - ORAL INTAKE PTA
pt daughter reports pt has stroke ~2 yrs ago and PEG tube was placed; PEG tube has not been used for feeds/been removed per pt daughter report/good

## 2018-03-07 NOTE — DIETITIAN INITIAL EVALUATION ADULT. - PERTINENT MEDS FT
heparin, abx, NaCl, ondansetron, atorvastatin, digoxin, diltiazem, levothyroxine, metoprolol, pantoprazole

## 2018-03-07 NOTE — SWALLOW BEDSIDE ASSESSMENT ADULT - SLP PERTINENT HISTORY OF CURRENT PROBLEM
pt admitted from home for abdominal distention s/p recent ex lap, [facility] for SBO 2 weeks prior. pt currently w/ c/o difficulty swallowing/fear of choking. pt known to SLP service during prior admission w/recs for NPO s/p CVA 2 yrs ago, family at b/s reporting pt had PEG placed at time+began eating PO at home w/o difficulty for the last few years; PEG has since been removed.

## 2018-03-07 NOTE — PROGRESS NOTE ADULT - ATTENDING COMMENTS
Cdiff Colitis with rising WBC   abdomen distended , mildly tender   continue PO vanco and add Flagyl   IV hydration   will keep close eye on her abdominal exam
atrial fibrillation   C Diff colitis admit to telemetry   Flagyl and PO vanc  fluid resuscitation   serial abdominal exam   correct lytes  HR control
pt seen and examined independently I have read and agree with above exam and poa, check repeat ct head for cva  s/s eval, may need peg, anthony current meds, ac if no peg

## 2018-03-07 NOTE — DIETITIAN INITIAL EVALUATION ADULT. - FACTORS AFF FOOD INTAKE
pt admitted for SBO, pt currently on clear liquid diet; pt daughter reports pt vomited 3/1 and pt may have fear of eating solid foods 2/2 fear of vomiting again; pt noted to have BM 3/6: tarry, dark brown

## 2018-03-07 NOTE — DIETITIAN INITIAL EVALUATION ADULT. - OTHER INFO
Pt p/w cc of abdominal distention, admitted to sx for c.diff colitis. Transferred to medicine from sx under private attending. Per sx team, pt stable for d/c. Pt daughter reports UBW ~95 lbs, with possible few lbs of wt loss 2/2 current health problems but denies major wt changes and reports stable wt PTA. (Reason for assessment: BMI<18)

## 2018-03-07 NOTE — DIETITIAN INITIAL EVALUATION ADULT. - ENERGY NEEDS
(8420-0218 kcal/day = MSJ x1.2-1.3 AF + 250 kcal); (48-69 g/day= 15-20% of energy needs); fluid needs: 1 ml : 1kcal

## 2018-03-08 ENCOUNTER — TRANSCRIPTION ENCOUNTER (OUTPATIENT)
Age: 78
End: 2018-03-08

## 2018-03-08 DIAGNOSIS — T45.515A ADVERSE EFFECT OF ANTICOAGULANTS, INITIAL ENCOUNTER: ICD-10-CM

## 2018-03-08 DIAGNOSIS — M79.81 NONTRAUMATIC HEMATOMA OF SOFT TISSUE: ICD-10-CM

## 2018-03-08 DIAGNOSIS — K56.609 UNSPECIFIED INTESTINAL OBSTRUCTION, UNSPECIFIED AS TO PARTIAL VERSUS COMPLETE OBSTRUCTION: ICD-10-CM

## 2018-03-08 DIAGNOSIS — T45.525A ADVERSE EFFECT OF ANTITHROMBOTIC DRUGS, INITIAL ENCOUNTER: ICD-10-CM

## 2018-03-08 PROBLEM — I63.9 CEREBRAL INFARCTION, UNSPECIFIED: Chronic | Status: INACTIVE | Noted: 2018-02-18 | Resolved: 2018-03-07

## 2018-03-08 LAB
ANION GAP SERPL CALC-SCNC: 12 MMOL/L — SIGNIFICANT CHANGE UP (ref 7–14)
BASOPHILS # BLD AUTO: 0.1 K/UL — SIGNIFICANT CHANGE UP (ref 0–0.2)
BASOPHILS NFR BLD AUTO: 0.9 % — SIGNIFICANT CHANGE UP (ref 0–1)
BUN SERPL-MCNC: <5 MG/DL — LOW (ref 10–20)
CALCIUM SERPL-MCNC: 7.8 MG/DL — LOW (ref 8.5–10.1)
CHLORIDE SERPL-SCNC: 104 MMOL/L — SIGNIFICANT CHANGE UP (ref 98–110)
CO2 SERPL-SCNC: 19 MMOL/L — SIGNIFICANT CHANGE UP (ref 17–32)
CREAT SERPL-MCNC: 0.6 MG/DL — LOW (ref 0.7–1.5)
EOSINOPHIL # BLD AUTO: 0.43 K/UL — SIGNIFICANT CHANGE UP (ref 0–0.7)
EOSINOPHIL NFR BLD AUTO: 4 % — SIGNIFICANT CHANGE UP (ref 0–8)
GLUCOSE SERPL-MCNC: 88 MG/DL — SIGNIFICANT CHANGE UP (ref 70–110)
HCT VFR BLD CALC: 30.7 % — LOW (ref 37–47)
HGB BLD-MCNC: 10 G/DL — LOW (ref 12–16)
IMM GRANULOCYTES NFR BLD AUTO: 0.6 % — HIGH (ref 0.1–0.3)
INR BLD: 1.04 RATIO — SIGNIFICANT CHANGE UP (ref 0.65–1.3)
LYMPHOCYTES # BLD AUTO: 1.59 K/UL — SIGNIFICANT CHANGE UP (ref 1.2–3.4)
LYMPHOCYTES # BLD AUTO: 14.9 % — LOW (ref 20.5–51.1)
MAGNESIUM SERPL-MCNC: 1.5 MG/DL — LOW (ref 1.8–2.4)
MCHC RBC-ENTMCNC: 27.4 PG — SIGNIFICANT CHANGE UP (ref 27–31)
MCHC RBC-ENTMCNC: 32.6 G/DL — SIGNIFICANT CHANGE UP (ref 32–37)
MCV RBC AUTO: 84.1 FL — SIGNIFICANT CHANGE UP (ref 81–99)
MONOCYTES # BLD AUTO: 1.53 K/UL — HIGH (ref 0.1–0.6)
MONOCYTES NFR BLD AUTO: 14.4 % — HIGH (ref 1.7–9.3)
NEUTROPHILS # BLD AUTO: 6.95 K/UL — HIGH (ref 1.4–6.5)
NEUTROPHILS NFR BLD AUTO: 65.2 % — SIGNIFICANT CHANGE UP (ref 42.2–75.2)
PLATELET # BLD AUTO: 789 K/UL — HIGH (ref 130–400)
POTASSIUM SERPL-MCNC: 3.2 MMOL/L — LOW (ref 3.5–5)
POTASSIUM SERPL-SCNC: 3.2 MMOL/L — LOW (ref 3.5–5)
PROTHROM AB SERPL-ACNC: 11.2 SEC — SIGNIFICANT CHANGE UP (ref 9.95–12.87)
RBC # BLD: 3.65 M/UL — LOW (ref 4.2–5.4)
RBC # FLD: 23.6 % — HIGH (ref 11.5–14.5)
SODIUM SERPL-SCNC: 135 MMOL/L — SIGNIFICANT CHANGE UP (ref 135–146)
WBC # BLD: 10.66 K/UL — SIGNIFICANT CHANGE UP (ref 4.8–10.8)
WBC # FLD AUTO: 10.66 K/UL — SIGNIFICANT CHANGE UP (ref 4.8–10.8)

## 2018-03-08 RX ORDER — VANCOMYCIN HCL 1 G
1 VIAL (EA) INTRAVENOUS
Qty: 12 | Refills: 0 | OUTPATIENT
Start: 2018-03-08 | End: 2018-03-10

## 2018-03-08 RX ORDER — ATORVASTATIN CALCIUM 80 MG/1
0 TABLET, FILM COATED ORAL
Qty: 0 | Refills: 0 | COMMUNITY

## 2018-03-08 RX ORDER — METRONIDAZOLE 500 MG
1 TABLET ORAL
Qty: 18 | Refills: 0 | OUTPATIENT
Start: 2018-03-08 | End: 2018-03-13

## 2018-03-08 RX ORDER — WARFARIN SODIUM 2.5 MG/1
7.5 TABLET ORAL ONCE
Qty: 0 | Refills: 0 | Status: COMPLETED | OUTPATIENT
Start: 2018-03-08 | End: 2018-03-08

## 2018-03-08 RX ORDER — OMEPRAZOLE 10 MG/1
1 CAPSULE, DELAYED RELEASE ORAL
Qty: 0 | Refills: 0 | COMMUNITY

## 2018-03-08 RX ORDER — POTASSIUM CHLORIDE 20 MEQ
40 PACKET (EA) ORAL ONCE
Qty: 0 | Refills: 0 | Status: COMPLETED | OUTPATIENT
Start: 2018-03-08 | End: 2018-03-08

## 2018-03-08 RX ORDER — LEVOTHYROXINE SODIUM 125 MCG
50 TABLET ORAL AT BEDTIME
Qty: 0 | Refills: 0 | Status: DISCONTINUED | OUTPATIENT
Start: 2018-03-08 | End: 2018-03-13

## 2018-03-08 RX ORDER — POTASSIUM CHLORIDE 20 MEQ
20 PACKET (EA) ORAL
Qty: 0 | Refills: 0 | Status: DISCONTINUED | OUTPATIENT
Start: 2018-03-08 | End: 2018-03-08

## 2018-03-08 RX ORDER — WARFARIN SODIUM 2.5 MG/1
4 TABLET ORAL
Qty: 120 | Refills: 0 | OUTPATIENT
Start: 2018-03-08 | End: 2018-04-06

## 2018-03-08 RX ORDER — SIMETHICONE 80 MG/1
1 TABLET, CHEWABLE ORAL
Qty: 30 | Refills: 0 | OUTPATIENT
Start: 2018-03-08

## 2018-03-08 RX ORDER — OMEPRAZOLE 10 MG/1
1 CAPSULE, DELAYED RELEASE ORAL
Qty: 30 | Refills: 0 | OUTPATIENT
Start: 2018-03-08 | End: 2018-04-06

## 2018-03-08 RX ORDER — ATORVASTATIN CALCIUM 80 MG/1
1 TABLET, FILM COATED ORAL
Qty: 0 | Refills: 0 | COMMUNITY
Start: 2018-03-08

## 2018-03-08 RX ORDER — MAGNESIUM SULFATE 500 MG/ML
2 VIAL (ML) INJECTION ONCE
Qty: 0 | Refills: 0 | Status: COMPLETED | OUTPATIENT
Start: 2018-03-08 | End: 2018-03-08

## 2018-03-08 RX ORDER — POTASSIUM CHLORIDE 20 MEQ
20 PACKET (EA) ORAL ONCE
Qty: 0 | Refills: 0 | Status: COMPLETED | OUTPATIENT
Start: 2018-03-08 | End: 2018-03-08

## 2018-03-08 RX ORDER — IPRATROPIUM/ALBUTEROL SULFATE 18-103MCG
3 AEROSOL WITH ADAPTER (GRAM) INHALATION ONCE
Qty: 0 | Refills: 0 | Status: COMPLETED | OUTPATIENT
Start: 2018-03-08 | End: 2018-03-08

## 2018-03-08 RX ORDER — LEVOTHYROXINE SODIUM 125 MCG
25 TABLET ORAL ONCE
Qty: 0 | Refills: 0 | Status: COMPLETED | OUTPATIENT
Start: 2018-03-08 | End: 2018-03-08

## 2018-03-08 RX ADMIN — LEVETIRACETAM 400 MILLIGRAM(S): 250 TABLET, FILM COATED ORAL at 05:50

## 2018-03-08 RX ADMIN — Medication 500 MILLIGRAM(S): at 14:49

## 2018-03-08 RX ADMIN — Medication 50 GRAM(S): at 12:15

## 2018-03-08 RX ADMIN — Medication 50 MILLIGRAM(S): at 17:39

## 2018-03-08 RX ADMIN — Medication 125 MILLIGRAM(S): at 11:57

## 2018-03-08 RX ADMIN — SERTRALINE 100 MILLIGRAM(S): 25 TABLET, FILM COATED ORAL at 11:58

## 2018-03-08 RX ADMIN — HEPARIN SODIUM 5000 UNIT(S): 5000 INJECTION INTRAVENOUS; SUBCUTANEOUS at 05:50

## 2018-03-08 RX ADMIN — PANTOPRAZOLE SODIUM 40 MILLIGRAM(S): 20 TABLET, DELAYED RELEASE ORAL at 11:58

## 2018-03-08 RX ADMIN — Medication 40 MILLIEQUIVALENT(S): at 17:40

## 2018-03-08 RX ADMIN — Medication 50 MILLIEQUIVALENT(S): at 18:00

## 2018-03-08 RX ADMIN — Medication 125 MILLIGRAM(S): at 19:45

## 2018-03-08 RX ADMIN — WARFARIN SODIUM 7.5 MILLIGRAM(S): 2.5 TABLET ORAL at 22:16

## 2018-03-08 RX ADMIN — HEPARIN SODIUM 5000 UNIT(S): 5000 INJECTION INTRAVENOUS; SUBCUTANEOUS at 14:48

## 2018-03-08 RX ADMIN — Medication 3 MILLILITER(S): at 22:11

## 2018-03-08 NOTE — DISCHARGE NOTE ADULT - PLAN OF CARE
Resolution of infection complete antibiotic course as prescribed and follow up with primary care doctor in 1 week. Maintain therapeutic INR for coumadin dosing Follow closely with primary care doctor and adjust dose of coumadin accordingly.  Currently the INR is sub-therapeutic, and will require close monitoring of levels until INR levels are maintained at optimal level. complete antibiotic course as prescribed and follow up with primary care doctor in 1 week.  Continue to take Vancomycin while on antibiotics for aspiration pneumonia. Start on oral lasix (diuretic) as prescribed and follow up with primary care doctor in 1 week.  Repeat chest XR and follow up with pulmonologist in 1-2 weeks.

## 2018-03-08 NOTE — PROGRESS NOTE ADULT - SUBJECTIVE AND OBJECTIVE BOX
Patient was seen and examined. Spoke with RN. Chart reviewed.  No events overnight.  Vital Signs Last 24 Hrs  T(F): 97.8 (08 Mar 2018 05:34), Max: 99.2 (07 Mar 2018 14:40)  HR: 90 (08 Mar 2018 06:07) (63 - 91)  BP: 167/80 (08 Mar 2018 06:07) (166/70 - 188/89)  SpO2: --  MEDICATIONS  (STANDING):  atorvastatin 10 milliGRAM(s) Oral at bedtime  digoxin     Tablet 0.25 milliGRAM(s) Oral daily  diltiazem    Tablet 90 milliGRAM(s) Enteral Tube every 6 hours  heparin  Injectable 5000 Unit(s) SubCutaneous every 8 hours  levETIRAcetam  IVPB 1000 milliGRAM(s) IV Intermittent every 12 hours  levothyroxine Injectable 25 MICROGram(s) IV Push at bedtime  metoprolol     tartrate 50 milliGRAM(s) Enteral Tube two times a day  metroNIDAZOLE    Tablet 500 milliGRAM(s) Oral every 8 hours  pantoprazole  Injectable 40 milliGRAM(s) IV Push daily  sertraline 100 milliGRAM(s) Oral daily  sodium chloride 0.9%. 1000 milliLiter(s) (50 mL/Hr) IV Continuous <Continuous>  vancomycin    Solution 125 milliGRAM(s) Oral every 6 hours    MEDICATIONS  (PRN):  ondansetron Injectable 4 milliGRAM(s) IV Push every 6 hours PRN Nausea and/or Vomiting    Labs:                        10.0   10.66 )-----------( 789      ( 08 Mar 2018 04:57 )             30.7                         9.9    12.63 )-----------( 829      ( 07 Mar 2018 11:26 )             31.0     08 Mar 2018 04:57    135    |  104    |  <5     ----------------------------<  88     3.2     |  19     |  0.6    07 Mar 2018 11:26    138    |  108    |  <5     ----------------------------<  102    3.6     |  23     |  0.7      Ca    7.8        08 Mar 2018 04:57  Ca    8.0        07 Mar 2018 11:26  Mg     1.5       08 Mar 2018 04:57  Mg     1.5       07 Mar 2018 11:26      PT/INR - ( 08 Mar 2018 04:57 )   PT: 11.20 sec;   INR: 1.04 ratio               General: comfortable, NAD  Neurology:  nonfocal  Head:  Normocephalic, atraumatic  ENT:  Mucosa moist, no ulcerations  Neck:  Supple, no JVD,   Skin: no breakdowns (as per RN)  Resp: CTA B/L  CV: RRR, S1S2,   GI: Soft, NT, bowel sounds  MS: No edema, + peripheral pulses, Left olayinka      A/P:  Patient is a 78y old Female who presents with a chief complaint of abdominal distention (02 Mar 2018 03:12)    Was admitted to surgery since admission for C diff colitis after recent ex lap, [facility] for SBO 2 weeks ptp with complication of i/a mesenteric hematoma (stable).     swallow eval    --  GI eval (Dr. Briggs) per pt/fam request. Pt previously had PEG by Dr. 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  Was doing well and tolerating oral feedings prior to her nausea/diarrhea / hospitalization.        C DIFF COLITIS  --  vanco (day 6), flagyl (day 3)        AF (RATE CONTROLLED)  --  cardio:  Afib with CHADs Vasc ~7 recommend resuming a/c once cleared by the surgical team  Check digoxin levels >> 1.9  will follow  --  surg:  Spoke with trauma surg [phone] re: a/c.  states pt already cleared from surgery re: a/c.   --  check daily INR (last checked 3/5) and re-start coumadin once within therapeutic range.      DC planning when cleared by GI    DVT prophylaxis  Decubitus prevention- all measures as per RN protocol  Please call or text me with any questions or updates

## 2018-03-08 NOTE — PROGRESS NOTE ADULT - SUBJECTIVE AND OBJECTIVE BOX
IRA PACKER 78y Female   MRN#: [id number]    Patient is a 78y old Female who presents with a chief complaint of abdominal distention (02 Mar 2018 03:12)    Currently admitted to medicine with the primary diagnosis of Clostridium difficile colitis     Today is hospital day 6d. This morning she is resting comfortably in bed. Tolerating po diet well without difficulty today upon insistence to eat by daughter at bedside.  No diarrhea overnight.  per rn,  formed but soft stool in am.      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  Seizures  Dementia  High cholesterol  Hypothyroidism, unspecified type  Hypertension, unspecified type  Diabetes  Atrial fibrillation, unspecified type  Mesenteric hematoma  S/P exploratory laparotomy  SBO (small bowel obstruction)  H/O abdominal hysterectomy  S/P percutaneous endoscopic gastrostomy (PEG) tube placement: s/p removal of peg tube      ALLERGIES:  No Known Allergies      MEDICATIONS:  STANDING MEDICATIONS  atorvastatin 10 milliGRAM(s) Oral at bedtime  digoxin     Tablet 0.25 milliGRAM(s) Oral daily  diltiazem    Tablet 90 milliGRAM(s) Enteral Tube every 6 hours  heparin  Injectable 5000 Unit(s) SubCutaneous every 8 hours  levETIRAcetam  IVPB 1000 milliGRAM(s) IV Intermittent every 12 hours  levothyroxine Injectable 25 MICROGram(s) IV Push at bedtime  magnesium sulfate  IVPB 2 Gram(s) IV Intermittent once  metoprolol     tartrate 50 milliGRAM(s) Enteral Tube two times a day  metroNIDAZOLE    Tablet 500 milliGRAM(s) Oral every 8 hours  pantoprazole  Injectable 40 milliGRAM(s) IV Push daily  potassium chloride  20 mEq/100 mL IVPB 20 milliEquivalent(s) IV Intermittent every 2 hours  sertraline 100 milliGRAM(s) Oral daily  sodium chloride 0.9%. 1000 milliLiter(s) IV Continuous <Continuous>  vancomycin    Solution 125 milliGRAM(s) Oral every 6 hours  warfarin 7.5 milliGRAM(s) Oral once    PRN MEDICATIONS  ondansetron Injectable 4 milliGRAM(s) IV Push every 6 hours PRN      VITALS:   T(F): 97.8, Max: 99.2 (03-07-18 @ 14:40)  HR: 90  BP: 167/80  RR: 18  SpO2: --    LABS:                        10.0   10.66 )-----------( 789      ( 08 Mar 2018 04:57 )             30.7     03-08    135  |  104  |  <5<L>  ----------------------------<  88  3.2<L>   |  19  |  0.6<L>    Ca    7.8<L>      08 Mar 2018 04:57  Mg     1.5     03-08      PT/INR - ( 08 Mar 2018 04:57 )   PT: 11.20 sec;   INR: 1.04 ratio           RADIOLOGY:      PHYSICAL EXAM:  Constitutional: non-toxic,  not in acute distress  HEENT: eomi,  wnl  Respiratory:  lung sounds b/l;   Cardiovascular:  s1, s2, irregular  Gastrointestinal:  nontender, nondistended, +bowel sounds  Extremities: no edema b/l le  Neurological: L hemiparesis (baseline)

## 2018-03-08 NOTE — DISCHARGE NOTE ADULT - MEDICATION SUMMARY - MEDICATIONS TO CHANGE
I will SWITCH the dose or number of times a day I take the medications listed below when I get home from the hospital:    Coumadin 4 mg oral tablet  -- 4 tab(s) by mouth once a day (at bedtime)   -- Do not take this drug if you are pregnant.  It is very important that you take or use this exactly as directed.  Do not skip doses or discontinue unless directed by your doctor.  Obtain medical advice before taking any non-prescription drugs as some may affect the action of this medication.    vancomycin 125 mg oral capsule  -- 1 cap(s) by mouth every 6 hours    Flagyl 500 mg oral tablet  -- 1 tab(s) by mouth every 8 hours I will SWITCH the dose or number of times a day I take the medications listed below when I get home from the hospital:    Coumadin 4 mg oral tablet  -- 4 tab(s) by mouth once a day (at bedtime)   -- Do not take this drug if you are pregnant.  It is very important that you take or use this exactly as directed.  Do not skip doses or discontinue unless directed by your doctor.  Obtain medical advice before taking any non-prescription drugs as some may affect the action of this medication.    vancomycin 125 mg oral capsule  -- 1 cap(s) by mouth every 6 hours

## 2018-03-08 NOTE — DISCHARGE NOTE ADULT - MEDICATION SUMMARY - MEDICATIONS TO TAKE
I will START or STAY ON the medications listed below when I get home from the hospital:    Flagyl 500 mg oral tablet  -- 1 tab(s) by mouth every 8 hours  -- Indication: For Clostridium difficile colitis    aspirin 81 mg oral tablet, chewable  -- Indication: For CVA (cerebral vascular accident)    Diovan 160 mg oral tablet  -- 1 tab(s) by mouth once a day  -- Indication: For htn    dilTIAZem 90 mg oral tablet  -- 1 tab(s) by mouth every 6 hours  -- Indication: For Atrial fibrillation, unspecified type    digoxin 250 mcg (0.25 mg) oral tablet  -- 1 tab(s) by mouth once a day  -- Indication: For Atrial fibrillation, unspecified type    digoxin 250 mcg (0.25 mg) oral tablet  -- 1 tab(s) by mouth once a day  -- Indication: For Atrial fibrillation, unspecified type    Coumadin 4 mg oral tablet  -- 4 tab(s) by mouth once a day (at bedtime)   -- Do not take this drug if you are pregnant.  It is very important that you take or use this exactly as directed.  Do not skip doses or discontinue unless directed by your doctor.  Obtain medical advice before taking any non-prescription drugs as some may affect the action of this medication.    -- Indication: For Atrial fibrillation, unspecified type    levETIRAcetam 1000 mg oral tablet  -- 1 tab(s) by mouth 2 times a day  -- Indication: For Seizure    traZODone 50 mg oral tablet  -- 1 tab(s) by mouth 3 times a day  -- Indication: For Anxiety/depression    Zoloft 100 mg oral tablet  -- 1 tab(s) by mouth once a day  -- Indication: For Anxiety/depression    mirtazapine  -- Indication: For Anxiety/depression    Januvia  -- Indication: For Diabetes    atorvastatin 10 mg oral tablet  -- 1 tab(s) by mouth once a day (at bedtime)  -- Indication: For Dyslipidemia    metoprolol tartrate 50 mg oral tablet  -- 1 tab(s) by mouth 2 times a day  -- Indication: For Atrial fibrillation, unspecified type    vancomycin 125 mg oral capsule  -- 1 cap(s) by mouth every 6 hours  -- Indication: For Clostridium difficile colitis    Pepcid 20 mg oral tablet  -- 1 tab(s) by mouth 2 times a day  -- Indication: For gerd    Namenda  -- Indication: For dementia    omeprazole 20 mg oral delayed release tablet  -- 1 tab(s) by mouth once a day  -- Indication: For gerd    levothyroxine 50 mcg (0.05 mg) oral tablet  -- 1 tab(s) by mouth once a day  -- Indication: For hypothyroid    Toviaz 8 mg oral tablet, extended release  -- 1 tab(s) by mouth once a day  -- Indication: For overactive bladder I will START or STAY ON the medications listed below when I get home from the hospital:    Flagyl 500 mg oral tablet  -- 1 tab(s) by mouth every 8 hours  -- Indication: For Clostridium difficile colitis    aspirin 81 mg oral tablet, chewable  -- Indication: For CVA (cerebral vascular accident)    Diovan 160 mg oral tablet  -- 1 tab(s) by mouth once a day  -- Indication: For htn    dilTIAZem 90 mg oral tablet  -- 1 tab(s) by mouth every 6 hours  -- Indication: For Atrial fibrillation, unspecified type    digoxin 250 mcg (0.25 mg) oral tablet  -- 1 tab(s) by mouth once a day  -- Indication: For Atrial fibrillation, unspecified type    Coumadin 4 mg oral tablet  -- 4 tab(s) by mouth once a day (at bedtime)   -- Do not take this drug if you are pregnant.  It is very important that you take or use this exactly as directed.  Do not skip doses or discontinue unless directed by your doctor.  Obtain medical advice before taking any non-prescription drugs as some may affect the action of this medication.    -- Indication: For Atrial fibrillation, unspecified type    levETIRAcetam 1000 mg oral tablet  -- 1 tab(s) by mouth 2 times a day  -- Indication: For Seizure    traZODone 50 mg oral tablet  -- 1 tab(s) by mouth 3 times a day  -- Indication: For Anxiety/depression    Zoloft 100 mg oral tablet  -- 1 tab(s) by mouth once a day  -- Indication: For Anxiety/depression    mirtazapine  -- Indication: For Anxiety/depression    Januvia  -- Indication: For Diabetes    atorvastatin 10 mg oral tablet  -- 1 tab(s) by mouth once a day (at bedtime)  -- Indication: For Dyslipidemia    metoprolol tartrate 50 mg oral tablet  -- 1 tab(s) by mouth 2 times a day  -- Indication: For Atrial fibrillation, unspecified type    vancomycin 125 mg oral capsule  -- 1 cap(s) by mouth every 6 hours  -- Indication: For Clostridium difficile colitis    Pepcid 20 mg oral tablet  -- 1 tab(s) by mouth 2 times a day  -- Indication: For gerd    Namenda  -- Indication: For dementia    simethicone 80 mg oral tablet  -- 1 tab(s) by mouth 3 times a day, As Needed -for indigestion / gas / bloating  -- Indication: For gas / indigestion / bloating    omeprazole 20 mg oral delayed release tablet  -- 1 tab(s) by mouth once a day  -- Indication: For gerd    levothyroxine 50 mcg (0.05 mg) oral tablet  -- 1 tab(s) by mouth once a day  -- Indication: For hypothyroid    Toviaz 8 mg oral tablet, extended release  -- 1 tab(s) by mouth once a day  -- Indication: For overactive bladder I will START or STAY ON the medications listed below when I get home from the hospital:    aspirin 81 mg oral tablet, chewable  -- Indication: For CVA (cerebral vascular accident)    Diovan 160 mg oral tablet  -- 1 tab(s) by mouth once a day  -- Indication: For htn    dilTIAZem 90 mg oral tablet  -- 1 tab(s) by mouth every 6 hours  -- Indication: For Atrial fibrillation, unspecified type    digoxin 250 mcg (0.25 mg) oral tablet  -- 1 tab(s) by mouth once a day  -- Indication: For Atrial fibrillation, unspecified type    Coumadin 4 mg oral tablet  -- 4 tab(s) by mouth once a day (at bedtime)   -- Do not take this drug if you are pregnant.  It is very important that you take or use this exactly as directed.  Do not skip doses or discontinue unless directed by your doctor.  Obtain medical advice before taking any non-prescription drugs as some may affect the action of this medication.    -- Indication: For Atrial fibrillation, unspecified type    levETIRAcetam 1000 mg oral tablet  -- 1 tab(s) by mouth 2 times a day  -- Indication: For Seizure    traZODone 50 mg oral tablet  -- 1 tab(s) by mouth 3 times a day  -- Indication: For Anxiety/depression    Zoloft 100 mg oral tablet  -- 1 tab(s) by mouth once a day  -- Indication: For Anxiety/depression    mirtazapine  -- Indication: For Anxiety/depression    Januvia  -- Indication: For Diabetes    atorvastatin 10 mg oral tablet  -- 1 tab(s) by mouth once a day (at bedtime)  -- Indication: For Dyslipidemia    metoprolol tartrate 50 mg oral tablet  -- 1 tab(s) by mouth 2 times a day  -- Indication: For Atrial fibrillation, unspecified type    Lasix 40 mg oral tablet  -- 1 tab(s) by mouth once a day  -- Indication: For pleural effusion    vancomycin 125 mg oral capsule  -- 1 cap(s) by mouth every 6 hours  -- Indication: For Clostridium difficile colitis    Pepcid 20 mg oral tablet  -- 1 tab(s) by mouth 2 times a day  -- Indication: For gerd    Namenda  -- Indication: For dementia    simethicone 80 mg oral tablet  -- 1 tab(s) by mouth 3 times a day, As Needed -for indigestion / gas / bloating  -- Indication: For gas / indigestion / bloating    amoxicillin-clavulanate 500 mg-125 mg oral tablet  -- 1 tab(s) by mouth 2 times a day   -- Finish all this medication unless otherwise directed by prescriber.  Take with food or milk.    -- Indication: For Aspiration pneumonia    omeprazole 20 mg oral delayed release tablet  -- 1 tab(s) by mouth once a day  -- Indication: For gerd    levothyroxine 50 mcg (0.05 mg) oral tablet  -- 1 tab(s) by mouth once a day  -- Indication: For hypothyroid    Toviaz 8 mg oral tablet, extended release  -- 1 tab(s) by mouth once a day  -- Indication: For overactive bladder

## 2018-03-08 NOTE — DISCHARGE NOTE ADULT - HOSPITAL COURSE
78 year old female with past medical history listed and recent ex lap, lysis of adhesion for small bowel obstruction 2 weeks prior to presentation with complication of intra-abdominal mesenteric hematoma (stable), presented with diarrhea.  Was admitted to surgery for C diff colitis and subsequently transferred to medical service prior to discharge.      Anticoagulation for patient's atrial fibrillation was resumed while inpatient after clearance to do so by surgery,  and patient will be discharged home with close outpatient follow up by primary care doctor regarding INR and warfarin dosing,  as well as the remaining doses of antibiotics for her C. Diff colitis. 78 year old female with past medical history listed and recent ex lap, lysis of adhesion for small bowel obstruction 2 weeks prior to presentation with complication of intra-abdominal mesenteric hematoma (stable), presented with diarrhea.  Was admitted to surgery for C diff colitis and subsequently transferred to medical service prior to discharge.      Anticoagulation for patient's atrial fibrillation was resumed while inpatient after clearance to do so by surgery,  and patient will be discharged home with close outpatient follow up by primary care doctor regarding INR and warfarin dosing,  as well as the remaining doses of antibiotics for her C. Diff colitis.      Patient was planned for discharge on 3/8 when she had desaturation episode into 80%'s with improvement on nasal cannula.  Found to have new right sided opacities/effusion on XR with P/F ratio 268 on ABG, and was started on antibiotic therapy for aspiration pneumonia.  Patient also found to have bilateral worsening of pleural effusions and given IV diuresis with some improvement on CXR.  Patient will be discharged on oral lasix with outpatient pulmonary follow up and repeat chest xr in 1-2 weeks.

## 2018-03-08 NOTE — CHART NOTE - NSCHARTNOTEFT_GEN_A_CORE
Patient was about to be discharged but then was notified by RN that patient was having shortness of breath. Full set of vitals werer done which showed patient was hypoxic on RA aroun 85% so put patient on 2L NC and now satting at 93%. Patinet looked anxious. After 30min tried to wean oxygen, but patient started desatting to high 80's. So cancelled discharge.

## 2018-03-08 NOTE — DISCHARGE NOTE ADULT - MEDICATION SUMMARY - MEDICATIONS TO STOP TAKING
I will STOP taking the medications listed below when I get home from the hospital:  None I will STOP taking the medications listed below when I get home from the hospital:    Flagyl 500 mg oral tablet  -- 1 tab(s) by mouth every 8 hours

## 2018-03-08 NOTE — PROGRESS NOTE ADULT - ASSESSMENT
78y female with PMH listed and recent ex lap, [facility] for SBO 2 weeks ptp with complication of i/a mesenteric hematoma (stable), pw cc diarrhea.  Was admitted to surgery for C diff colitis.  Transferred now to medicine after surgery team deemed patient stable for discharge.     Pt has not been tolerating oral diet well throughout hospitalization and currently c/o nausea and possible dysphagia.       PLAN :    POOR ORAL INTAKE 2/2 DEPRESSED MOOD (NO DYSPHAGIA)  --  s/s:  mech soft cut up + thin liq  --  GI eval not necessary given no dysphagia and ability to tolerate po diet.     C DIFF COLITIS  --  vanco (day 7), flagyl (day 4)      AF (RATE CONTROLLED)  --  cardio:  Afib with CHADs Vasc ~7 recommend resuming a/c once cleared by the surgical team  --  surg:   cleared from surgery re: a/c.   --  started coumadin again last night.  inr today 1.04.  d/w attending >>    no briding 2/2 high risk of bleed 2/2 recent ex lap + still present intra-abd hematoma + old age.  can d/c with very close follow up re: inr at home.    Spoke with pt's pmd who follows and adjust pt's coumadin at home:  Dr. Espinoza @ [phone]  >> he will follow up patients coumadin closely himself.  Does not want us to set up home house draws for her as he has the ability / machine to do so himself.   Wants us to give 5mg today and after dc start on 4mg and he will adjust according afterward.       SEIZURES / DEMENTIA / DL / HYPOTHYROID / HTN / DM / CVA W RESIDUAL L HEMIPARESIS  --  cw home regimen >>  can switch back to oral given no dysphagia.   --  check fs, holding oral dm rx, and start insulin sliding scale if necessary.       DVT PPX  CODE STATUS: DNR / DNI (clarified with family/pt today,  forms in chart now)  DISPO:  DC HOME TODAY

## 2018-03-08 NOTE — DISCHARGE NOTE ADULT - PATIENT PORTAL LINK FT
You can access the [employer] [facility] Patient Portal, offered by [facility], by registering with the following website: [email or web address]

## 2018-03-08 NOTE — DISCHARGE NOTE ADULT - CARE PLAN
Principal Discharge DX:	Clostridium difficile colitis  Goal:	Resolution of infection  Assessment and plan of treatment:	complete antibiotic course as prescribed and follow up with primary care doctor in 1 week.  Secondary Diagnosis:	Atrial fibrillation, unspecified type  Goal:	Maintain therapeutic INR for coumadin dosing  Assessment and plan of treatment:	Follow closely with primary care doctor and adjust dose of coumadin accordingly.  Currently the INR is sub-therapeutic, and will require close monitoring of levels until INR levels are maintained at optimal level. Principal Discharge DX:	Clostridium difficile colitis  Goal:	Resolution of infection  Assessment and plan of treatment:	complete antibiotic course as prescribed and follow up with primary care doctor in 1 week.  Continue to take Vancomycin while on antibiotics for aspiration pneumonia.  Secondary Diagnosis:	Atrial fibrillation, unspecified type  Goal:	Maintain therapeutic INR for coumadin dosing  Assessment and plan of treatment:	Follow closely with primary care doctor and adjust dose of coumadin accordingly.  Currently the INR is sub-therapeutic, and will require close monitoring of levels until INR levels are maintained at optimal level.  Secondary Diagnosis:	Aspiration pneumonia  Goal:	Resolution of infection  Assessment and plan of treatment:	complete antibiotic course as prescribed and follow up with primary care doctor in 1 week.  Secondary Diagnosis:	Pleural effusion  Assessment and plan of treatment:	Start on oral lasix (diuretic) as prescribed and follow up with primary care doctor in 1 week.  Repeat chest XR and follow up with pulmonologist in 1-2 weeks.

## 2018-03-09 LAB
ANION GAP SERPL CALC-SCNC: 8 MMOL/L — SIGNIFICANT CHANGE UP (ref 7–14)
APTT BLD: 24.9 SEC — LOW (ref 27–39.2)
BASE EXCESS BLDA CALC-SCNC: 4.3 MMOL/L — HIGH (ref -2–2)
BASOPHILS # BLD AUTO: 0.07 K/UL — SIGNIFICANT CHANGE UP (ref 0–0.2)
BASOPHILS NFR BLD AUTO: 0.6 % — SIGNIFICANT CHANGE UP (ref 0–1)
BUN SERPL-MCNC: <5 MG/DL — LOW (ref 10–20)
CALCIUM SERPL-MCNC: 7.8 MG/DL — LOW (ref 8.5–10.1)
CHLORIDE SERPL-SCNC: 104 MMOL/L — SIGNIFICANT CHANGE UP (ref 98–110)
CK MB BLD-MCNC: 4 % — SIGNIFICANT CHANGE UP (ref 0–4)
CK MB CFR SERPL CALC: 1.8 NG/ML — SIGNIFICANT CHANGE UP (ref 0.6–6.3)
CK SERPL-CCNC: 44 U/L — SIGNIFICANT CHANGE UP (ref 0–225)
CO2 SERPL-SCNC: 24 MMOL/L — SIGNIFICANT CHANGE UP (ref 17–32)
CREAT SERPL-MCNC: 0.4 MG/DL — LOW (ref 0.7–1.5)
EOSINOPHIL # BLD AUTO: 0.41 K/UL — SIGNIFICANT CHANGE UP (ref 0–0.7)
EOSINOPHIL NFR BLD AUTO: 3.8 % — SIGNIFICANT CHANGE UP (ref 0–8)
GAS PNL BLDA: SIGNIFICANT CHANGE UP
GLUCOSE SERPL-MCNC: 168 MG/DL — HIGH (ref 70–110)
HCO3 BLDA-SCNC: 29 MMOL/L — HIGH (ref 23–27)
HCT VFR BLD CALC: 27.4 % — LOW (ref 37–47)
HGB BLD-MCNC: 9 G/DL — LOW (ref 12–16)
HOROWITZ INDEX BLDA+IHG-RTO: 28 — SIGNIFICANT CHANGE UP
IMM GRANULOCYTES NFR BLD AUTO: 0.5 % — HIGH (ref 0.1–0.3)
INR BLD: 1.15 RATIO — SIGNIFICANT CHANGE UP (ref 0.65–1.3)
LYMPHOCYTES # BLD AUTO: 1.2 K/UL — SIGNIFICANT CHANGE UP (ref 1.2–3.4)
LYMPHOCYTES # BLD AUTO: 11.1 % — LOW (ref 20.5–51.1)
MAGNESIUM SERPL-MCNC: 1.7 MG/DL — LOW (ref 1.8–2.4)
MCHC RBC-ENTMCNC: 27.4 PG — SIGNIFICANT CHANGE UP (ref 27–31)
MCHC RBC-ENTMCNC: 32.8 G/DL — SIGNIFICANT CHANGE UP (ref 32–37)
MCV RBC AUTO: 83.3 FL — SIGNIFICANT CHANGE UP (ref 81–99)
MONOCYTES # BLD AUTO: 1.27 K/UL — HIGH (ref 0.1–0.6)
MONOCYTES NFR BLD AUTO: 11.8 % — HIGH (ref 1.7–9.3)
NEUTROPHILS # BLD AUTO: 7.8 K/UL — HIGH (ref 1.4–6.5)
NEUTROPHILS NFR BLD AUTO: 72.2 % — SIGNIFICANT CHANGE UP (ref 42.2–75.2)
PCO2 BLDA: 40 MMHG — SIGNIFICANT CHANGE UP (ref 38–42)
PH BLDA: 7.46 — HIGH (ref 7.38–7.42)
PLATELET # BLD AUTO: 721 K/UL — HIGH (ref 130–400)
PO2 BLDA: 75 MMHG — LOW (ref 78–95)
POTASSIUM SERPL-MCNC: 4 MMOL/L — SIGNIFICANT CHANGE UP (ref 3.5–5)
POTASSIUM SERPL-SCNC: 4 MMOL/L — SIGNIFICANT CHANGE UP (ref 3.5–5)
PROTHROM AB SERPL-ACNC: 12.5 SEC — SIGNIFICANT CHANGE UP (ref 9.95–12.87)
RBC # BLD: 3.29 M/UL — LOW (ref 4.2–5.4)
RBC # FLD: 23.9 % — HIGH (ref 11.5–14.5)
SAO2 % BLDA: 96 % — SIGNIFICANT CHANGE UP (ref 94–98)
SODIUM SERPL-SCNC: 136 MMOL/L — SIGNIFICANT CHANGE UP (ref 135–146)
TROPONIN I SERPL-MCNC: <0.02 NG/ML — SIGNIFICANT CHANGE UP (ref 0–0.05)
WBC # BLD: 10.8 K/UL — SIGNIFICANT CHANGE UP (ref 4.8–10.8)
WBC # FLD AUTO: 10.8 K/UL — SIGNIFICANT CHANGE UP (ref 4.8–10.8)

## 2018-03-09 RX ORDER — CEFEPIME 1 G/1
1000 INJECTION, POWDER, FOR SOLUTION INTRAMUSCULAR; INTRAVENOUS EVERY 8 HOURS
Qty: 0 | Refills: 0 | Status: DISCONTINUED | OUTPATIENT
Start: 2018-03-09 | End: 2018-03-10

## 2018-03-09 RX ORDER — MAGNESIUM SULFATE 500 MG/ML
2 VIAL (ML) INJECTION ONCE
Qty: 0 | Refills: 0 | Status: COMPLETED | OUTPATIENT
Start: 2018-03-09 | End: 2018-03-09

## 2018-03-09 RX ORDER — MEROPENEM 1 G/30ML
1000 INJECTION INTRAVENOUS ONCE
Qty: 0 | Refills: 0 | Status: COMPLETED | OUTPATIENT
Start: 2018-03-09 | End: 2018-03-09

## 2018-03-09 RX ORDER — LABETALOL HCL 100 MG
10 TABLET ORAL ONCE
Qty: 0 | Refills: 0 | Status: COMPLETED | OUTPATIENT
Start: 2018-03-09 | End: 2018-03-09

## 2018-03-09 RX ORDER — AMLODIPINE BESYLATE 2.5 MG/1
5 TABLET ORAL ONCE
Qty: 0 | Refills: 0 | Status: COMPLETED | OUTPATIENT
Start: 2018-03-09 | End: 2018-03-09

## 2018-03-09 RX ORDER — MEROPENEM 1 G/30ML
INJECTION INTRAVENOUS
Qty: 0 | Refills: 0 | Status: DISCONTINUED | OUTPATIENT
Start: 2018-03-09 | End: 2018-03-10

## 2018-03-09 RX ORDER — MEROPENEM 1 G/30ML
1000 INJECTION INTRAVENOUS EVERY 8 HOURS
Qty: 0 | Refills: 0 | Status: DISCONTINUED | OUTPATIENT
Start: 2018-03-09 | End: 2018-03-10

## 2018-03-09 RX ORDER — CEFEPIME 1 G/1
1000 INJECTION, POWDER, FOR SOLUTION INTRAMUSCULAR; INTRAVENOUS ONCE
Qty: 0 | Refills: 0 | Status: COMPLETED | OUTPATIENT
Start: 2018-03-09 | End: 2018-03-09

## 2018-03-09 RX ORDER — CEFEPIME 1 G/1
INJECTION, POWDER, FOR SOLUTION INTRAMUSCULAR; INTRAVENOUS
Qty: 0 | Refills: 0 | Status: DISCONTINUED | OUTPATIENT
Start: 2018-03-09 | End: 2018-03-10

## 2018-03-09 RX ORDER — HYDRALAZINE HCL 50 MG
25 TABLET ORAL ONCE
Qty: 0 | Refills: 0 | Status: COMPLETED | OUTPATIENT
Start: 2018-03-09 | End: 2018-03-09

## 2018-03-09 RX ORDER — WARFARIN SODIUM 2.5 MG/1
4 TABLET ORAL DAILY
Qty: 0 | Refills: 0 | Status: COMPLETED | OUTPATIENT
Start: 2018-03-09 | End: 2018-03-11

## 2018-03-09 RX ADMIN — SERTRALINE 100 MILLIGRAM(S): 25 TABLET, FILM COATED ORAL at 13:42

## 2018-03-09 RX ADMIN — WARFARIN SODIUM 4 MILLIGRAM(S): 2.5 TABLET ORAL at 21:38

## 2018-03-09 RX ADMIN — LEVETIRACETAM 400 MILLIGRAM(S): 250 TABLET, FILM COATED ORAL at 21:37

## 2018-03-09 RX ADMIN — MEROPENEM 100 MILLIGRAM(S): 1 INJECTION INTRAVENOUS at 23:27

## 2018-03-09 RX ADMIN — CEFEPIME 100 MILLIGRAM(S): 1 INJECTION, POWDER, FOR SOLUTION INTRAMUSCULAR; INTRAVENOUS at 23:51

## 2018-03-09 RX ADMIN — Medication 125 MILLIGRAM(S): at 20:55

## 2018-03-09 RX ADMIN — MEROPENEM 100 MILLIGRAM(S): 1 INJECTION INTRAVENOUS at 14:42

## 2018-03-09 RX ADMIN — Medication 25 MILLIGRAM(S): at 21:37

## 2018-03-09 RX ADMIN — Medication 25 MICROGRAM(S): at 00:29

## 2018-03-09 RX ADMIN — CEFEPIME 100 MILLIGRAM(S): 1 INJECTION, POWDER, FOR SOLUTION INTRAMUSCULAR; INTRAVENOUS at 15:41

## 2018-03-09 RX ADMIN — LEVETIRACETAM 400 MILLIGRAM(S): 250 TABLET, FILM COATED ORAL at 05:51

## 2018-03-09 RX ADMIN — Medication 125 MILLIGRAM(S): at 13:41

## 2018-03-09 RX ADMIN — HEPARIN SODIUM 5000 UNIT(S): 5000 INJECTION INTRAVENOUS; SUBCUTANEOUS at 23:52

## 2018-03-09 RX ADMIN — Medication 50 GRAM(S): at 21:44

## 2018-03-09 RX ADMIN — HEPARIN SODIUM 5000 UNIT(S): 5000 INJECTION INTRAVENOUS; SUBCUTANEOUS at 13:43

## 2018-03-09 RX ADMIN — PANTOPRAZOLE SODIUM 40 MILLIGRAM(S): 20 TABLET, DELAYED RELEASE ORAL at 13:42

## 2018-03-09 RX ADMIN — Medication 10 MILLIGRAM(S): at 01:51

## 2018-03-09 NOTE — PROVIDER CONTACT NOTE (OTHER) - ASSESSMENT
BP-182/86 HR 96
PT V/S- /85 HR-123 Pulse OX- 93% on RA.
dr tejeda verbalized she will review pts meds

## 2018-03-09 NOTE — PROGRESS NOTE ADULT - ASSESSMENT
78y female with PMH listed and recent ex lap, [facility] for SBO 2 weeks ptp with complication of i/a mesenteric hematoma (stable), pw cc diarrhea.  Was admitted to surgery for C diff colitis.  Transferred now to medicine after surgery team deemed patient stable for discharge.  Patient had poor appetite, but was able to tolerate oral diet.  She was evaluated by speech/swallow during hospitalization to ensure no dysphagia.   Patient was planned for discharge on 3/8 when she had desaturation episode into 80's with improvement on nc.        PLAN :    SOB 2/2 ACUTE RESP FAILURE? (NO ABG YET) / POSSIBLE ASPIRATION EVENT ON 3/8  --  current o2: 98% on 2L >> 93-95% on ra this am.    --  questionable acct of chest tightness (per family, no) vs purely sob yesterday:  check ekg, ce.   --  check urgent CXR, ABG, V DPLX  --  pulm eval      POOR ORAL INTAKE 2/2 DEPRESSED MOOD (NO DYSPHAGIA)  --  s/s:  mech soft cut up + thin liq    C DIFF COLITIS  --  vanco (day 8), flagyl (day 5)      AF (RATE CONTROLLED)  --  cardio:  Afib with CHADs Vasc ~7 recommend resuming a/c once cleared by the surgical team  --  surg:   cleared from surgery re: a/c.   --  started coumadin again last night.  inr today 1.04.  d/w attending >>    no briding 2/2 high risk of bleed 2/2 recent ex lap + still present intra-abd hematoma + old age.   Dr. Espinoza @ [phone] will follow up patients coumadin closely himself.  Does not want us to set up home house draws.     SEIZURES / DEMENTIA / DL / HYPOTHYROID / HTN / DM / CVA W RESIDUAL L HEMIPARESIS  --  cw home regimen >>  can switch back to oral given no dysphagia.   --  check fs, holding oral dm rx, and start insulin sliding scale if necessary.       DVT PPX  CODE STATUS: DNR / DNI (clarified with family/pt today,  forms in chart now)  DISPO:  DC HOME TODAY 78y female with PMH listed and recent ex lap, [facility] for SBO 2 weeks ptp with complication of i/a mesenteric hematoma (stable), pw cc diarrhea.  Was admitted to surgery for C diff colitis.  Transferred now to medicine after surgery team deemed patient stable for discharge.  Patient had poor appetite, but was able to tolerate oral diet.  She was evaluated by speech/swallow during hospitalization to ensure no dysphagia.   Patient was planned for discharge on 3/8 when she had desaturation episode into 80's with improvement on nasal cannula.  Found to have new right sided opacities/effusion on XR with P/F ratio 268 on ABG, and was started on empiric abx for aspiration pneumonia.        PLAN :    ACUTE RESPIRATORY FAILURE (P/F RATIO 268) 2/2 LIKELY ASP PNA  --  ddx: r/o pe (low probability pe 2/2 wells score)  --  current o2: 98% on 2L >> 93-95% on ra this am.    --  f/u le duplex for dvt  >> if no DVT, continue with slow tapering of coumadin without bridging.  (spoke with attd)  --  empiric abx: queenie + cefepime  --  pulm eval  --  id eval      AF (RATE CONTROLLED)  + SUBTHERAPEUTIC INR  --  cardio:  Afib with CHADs Vasc ~7 recommend resuming a/c once cleared by the surgical team  --  surg:   cleared from surgery re: a/c.   --  started coumadin again last night.  inr today 1.04.  d/w attending >>    no briding 2/2 high risk of bleed 2/2 recent ex lap + still present intra-abd hematoma + old age.   Dr. Espinoza @ [phone] will follow up patients coumadin closely himself.  Does not want us to set up home house draws.       C DIFF COLITIS - RESOLVING  --  vanco (day 8), flagyl (day 5)        POOR ORAL INTAKE 2/2 DEPRESSED MOOD (NO DYSPHAGIA)  --  s/s:  mech soft cut up + thin liq      SEIZURES / DEMENTIA / DL / HYPOTHYROID / HTN / DM / CVA W RESIDUAL L HEMIPARESIS  --  cw home regimen >>  can switch back to oral given no dysphagia.   --  check fs, holding oral dm rx, and start insulin sliding scale if necessary.       DVT PPX  CODE STATUS: DNR / DNI (clarified with family/pt today,  forms in chart now)  DISPO:  DC HOME TODAY

## 2018-03-09 NOTE — PROGRESS NOTE ADULT - SUBJECTIVE AND OBJECTIVE BOX
Patient was seen and examined. Spoke with RN. Chart reviewed.  No events overnight.  DC held yesterday due to hypoxia- O2 sat 87% RA  Now comfortbale on O2 VNC    Vital Signs Last 24 Hrs  T(F): 97 (09 Mar 2018 05:39), Max: 98.5 (08 Mar 2018 08:26)  HR: 79 (09 Mar 2018 05:39) (76 - 96)  BP: 156/70 (09 Mar 2018 05:39) (150/80 - 195/88)  SpO2: 93% (08 Mar 2018 19:12) (93% - 93%)  MEDICATIONS  (STANDING):  atorvastatin 10 milliGRAM(s) Oral at bedtime  digoxin     Tablet 0.25 milliGRAM(s) Oral daily  diltiazem    Tablet 90 milliGRAM(s) Enteral Tube every 6 hours  heparin  Injectable 5000 Unit(s) SubCutaneous every 8 hours  levETIRAcetam  IVPB 1000 milliGRAM(s) IV Intermittent every 12 hours  levothyroxine 50 MICROGram(s) Oral at bedtime  metoprolol     tartrate 50 milliGRAM(s) Enteral Tube two times a day  metroNIDAZOLE    Tablet 500 milliGRAM(s) Oral every 8 hours  pantoprazole  Injectable 40 milliGRAM(s) IV Push daily  sertraline 100 milliGRAM(s) Oral daily  sodium chloride 0.9%. 1000 milliLiter(s) (50 mL/Hr) IV Continuous <Continuous>  vancomycin    Solution 125 milliGRAM(s) Oral every 6 hours    MEDICATIONS  (PRN):  ondansetron Injectable 4 milliGRAM(s) IV Push every 6 hours PRN Nausea and/or Vomiting    Labs:                        10.0   10.66 )-----------( 789      ( 08 Mar 2018 04:57 )             30.7                         9.9    12.63 )-----------( 829      ( 07 Mar 2018 11:26 )             31.0     08 Mar 2018 04:57    135    |  104    |  <5     ----------------------------<  88     3.2     |  19     |  0.6    07 Mar 2018 11:26    138    |  108    |  <5     ----------------------------<  102    3.6     |  23     |  0.7      Ca    7.8        08 Mar 2018 04:57  Ca    8.0        07 Mar 2018 11:26  Mg     1.5       08 Mar 2018 04:57  Mg     1.5       07 Mar 2018 11:26      PT/INR - ( 08 Mar 2018 04:57 )   PT: 11.20 sec;   INR: 1.04 ratio               General: comfortable, NAD  Neurology: nonfocal  Head:  Normocephalic, atraumatic  ENT:  Mucosa moist, no ulcerations  Neck:  Supple, no JVD,   Skin: no breakdowns (as per RN)  Resp: CTA B/L  CV: RRR, S1S2,   GI: Soft, NT, bowel sounds  MS: No edema, + peripheral pulses, FROM all 4 extremity      A/P:  78y female with PMH listed and recent ex lap, [facility] for SBO 2 weeks ptp with complication of i/a mesenteric hematoma (stable), pw cc diarrhea.  Was admitted to surgery for C diff colitis.     Pt has not been tolerating oral diet well throughout hospitalization and currently c/o nausea and possible dysphagia.       PLAN :    POOR ORAL INTAKE 2/2 DEPRESSED MOOD (NO DYSPHAGIA)  --  s/s:  mech soft cut up + thin liq  --  GI eval not necessary given no dysphagia and ability to tolerate po diet.     C DIFF COLITIS  --  vanco (day 8), flagyl (day 5)      AF (RATE CONTROLLED)  --  cardio:  Afib with CHADs Vasc ~7  resumed a/c after cleared by the surgical team    Check O2 sat- may need home O2  pulm eval of worsening hypoxia  check venous duplex LE    DC planning      DVT prophylaxis  Decubitus prevention- all measures as per RN protocol  Please call or text me with any questions or updates

## 2018-03-09 NOTE — PROGRESS NOTE ADULT - SUBJECTIVE AND OBJECTIVE BOX
IRA PACKER 78y Female   MRN#: [id number]    Patient is a 78y old Female who presents with a chief complaint of Diarrhea (08 Mar 2018 11:50)    Currently admitted to medicine with the primary diagnosis of Clostridium difficile colitis     Today is hospital day 7d.  Pt was planned for discharge yesterday when she desaturated into 80's with c/o SOB prior to being transported out.   As per family,  pt had larger intake of food yesterday than usual, and it's possible she might have had some choking events during feeding.   Given her desaturation, discharge was cancelled.  However no further w/u occurred at that time.   Patient is currently satting 98% on 2L,  however upon titrating down to ra,  patient does go down to 93-95%.   She is not currently c/o any sob.   Chest xray,  abg, ekg (questionable account of chest tightness? however not confirmed by family who was there during the event).  She denied am labs,  but with family here at bedside now,  is more agreeable.  I will draw labs myself after rounds.     No tachycardia recorded over night, but unsure if unrecorded tachycardia occurred.  No unilateral leg swelling / pain.   Possible aspiration may be a cause of mily desaturation.  Wells score 1.5 if no tachycardia.     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  Seizures  Dementia  High cholesterol  Hypothyroidism, unspecified type  Hypertension, unspecified type  Diabetes  Atrial fibrillation, unspecified type  Mesenteric hematoma  S/P exploratory laparotomy  SBO (small bowel obstruction)  H/O abdominal hysterectomy  S/P percutaneous endoscopic gastrostomy (PEG) tube placement: s/p removal of peg tube      ALLERGIES:  No Known Allergies      MEDICATIONS:  STANDING MEDICATIONS  atorvastatin 10 milliGRAM(s) Oral at bedtime  digoxin     Tablet 0.25 milliGRAM(s) Oral daily  diltiazem    Tablet 90 milliGRAM(s) Enteral Tube every 6 hours  heparin  Injectable 5000 Unit(s) SubCutaneous every 8 hours  levETIRAcetam  IVPB 1000 milliGRAM(s) IV Intermittent every 12 hours  levothyroxine 50 MICROGram(s) Oral at bedtime  metoprolol     tartrate 50 milliGRAM(s) Enteral Tube two times a day  metroNIDAZOLE    Tablet 500 milliGRAM(s) Oral every 8 hours  pantoprazole  Injectable 40 milliGRAM(s) IV Push daily  sertraline 100 milliGRAM(s) Oral daily  sodium chloride 0.9%. 1000 milliLiter(s) IV Continuous <Continuous>  vancomycin    Solution 125 milliGRAM(s) Oral every 6 hours    PRN MEDICATIONS  ondansetron Injectable 4 milliGRAM(s) IV Push every 6 hours PRN      VITALS:   T(F): 97, Max: 98.5 (03-08-18 @ 19:12)  HR: 79  BP: 156/70  RR: 18  SpO2: 92%    LABS:                        10.0   10.66 )-----------( 789      ( 08 Mar 2018 04:57 )             30.7     03-08    135  |  104  |  <5<L>  ----------------------------<  88  3.2<L>   |  19  |  0.6<L>    Ca    7.8<L>      08 Mar 2018 04:57  Mg     1.5     03-08      PT/INR - ( 08 Mar 2018 04:57 )   PT: 11.20 sec;   INR: 1.04 ratio                         RADIOLOGY:      PHYSICAL EXAM:  Constitutional: non-toxic,  not in acute distress  HEENT: eomi,  wnl  Respiratory:  +expiratory wheezing b/l.    Cardiovascular:  s1, s2,  regular, no murmurs  Gastrointestinal:  nontender, nondistended, +bowel sounds  Extremities: no edema b/l le;  no unilateral swelling.    Neurological: L hp @ baseline.

## 2018-03-10 LAB
ANION GAP SERPL CALC-SCNC: 6 MMOL/L — LOW (ref 7–14)
BASOPHILS # BLD AUTO: 0.05 K/UL — SIGNIFICANT CHANGE UP (ref 0–0.2)
BASOPHILS NFR BLD AUTO: 0.5 % — SIGNIFICANT CHANGE UP (ref 0–1)
BUN SERPL-MCNC: <5 MG/DL — LOW (ref 10–20)
CALCIUM SERPL-MCNC: 7.9 MG/DL — LOW (ref 8.5–10.1)
CHLORIDE SERPL-SCNC: 106 MMOL/L — SIGNIFICANT CHANGE UP (ref 98–110)
CO2 SERPL-SCNC: 27 MMOL/L — SIGNIFICANT CHANGE UP (ref 17–32)
CREAT SERPL-MCNC: 0.4 MG/DL — LOW (ref 0.7–1.5)
EOSINOPHIL # BLD AUTO: 0.25 K/UL — SIGNIFICANT CHANGE UP (ref 0–0.7)
EOSINOPHIL NFR BLD AUTO: 2.5 % — SIGNIFICANT CHANGE UP (ref 0–8)
GLUCOSE SERPL-MCNC: 173 MG/DL — HIGH (ref 70–110)
HCT VFR BLD CALC: 27.1 % — LOW (ref 37–47)
HGB BLD-MCNC: 9 G/DL — LOW (ref 12–16)
IMM GRANULOCYTES NFR BLD AUTO: 0.5 % — HIGH (ref 0.1–0.3)
INR BLD: 1.31 RATIO — HIGH (ref 0.65–1.3)
LYMPHOCYTES # BLD AUTO: 1.19 K/UL — LOW (ref 1.2–3.4)
LYMPHOCYTES # BLD AUTO: 11.9 % — LOW (ref 20.5–51.1)
MAGNESIUM SERPL-MCNC: 1.9 MG/DL — SIGNIFICANT CHANGE UP (ref 1.8–2.4)
MCHC RBC-ENTMCNC: 27.9 PG — SIGNIFICANT CHANGE UP (ref 27–31)
MCHC RBC-ENTMCNC: 33.2 G/DL — SIGNIFICANT CHANGE UP (ref 32–37)
MCV RBC AUTO: 83.9 FL — SIGNIFICANT CHANGE UP (ref 81–99)
MONOCYTES # BLD AUTO: 1.09 K/UL — HIGH (ref 0.1–0.6)
MONOCYTES NFR BLD AUTO: 10.9 % — HIGH (ref 1.7–9.3)
NEUTROPHILS # BLD AUTO: 7.35 K/UL — HIGH (ref 1.4–6.5)
NEUTROPHILS NFR BLD AUTO: 73.7 % — SIGNIFICANT CHANGE UP (ref 42.2–75.2)
PLATELET # BLD AUTO: 704 K/UL — HIGH (ref 130–400)
POTASSIUM SERPL-MCNC: 3 MMOL/L — LOW (ref 3.5–5)
POTASSIUM SERPL-SCNC: 3 MMOL/L — LOW (ref 3.5–5)
PROTHROM AB SERPL-ACNC: 14.2 SEC — HIGH (ref 9.95–12.87)
RBC # BLD: 3.23 M/UL — LOW (ref 4.2–5.4)
RBC # FLD: 23.9 % — HIGH (ref 11.5–14.5)
SODIUM SERPL-SCNC: 139 MMOL/L — SIGNIFICANT CHANGE UP (ref 135–146)
WBC # BLD: 9.98 K/UL — SIGNIFICANT CHANGE UP (ref 4.8–10.8)
WBC # FLD AUTO: 9.98 K/UL — SIGNIFICANT CHANGE UP (ref 4.8–10.8)

## 2018-03-10 RX ADMIN — LEVETIRACETAM 400 MILLIGRAM(S): 250 TABLET, FILM COATED ORAL at 06:45

## 2018-03-10 RX ADMIN — Medication 50 MICROGRAM(S): at 22:22

## 2018-03-10 RX ADMIN — Medication 0.25 MILLIGRAM(S): at 06:17

## 2018-03-10 RX ADMIN — HEPARIN SODIUM 5000 UNIT(S): 5000 INJECTION INTRAVENOUS; SUBCUTANEOUS at 15:49

## 2018-03-10 RX ADMIN — LEVETIRACETAM 400 MILLIGRAM(S): 250 TABLET, FILM COATED ORAL at 17:25

## 2018-03-10 RX ADMIN — WARFARIN SODIUM 4 MILLIGRAM(S): 2.5 TABLET ORAL at 22:22

## 2018-03-10 RX ADMIN — Medication 50 MILLIGRAM(S): at 06:17

## 2018-03-10 RX ADMIN — ATORVASTATIN CALCIUM 10 MILLIGRAM(S): 80 TABLET, FILM COATED ORAL at 22:22

## 2018-03-10 RX ADMIN — Medication 10 MILLIGRAM(S): at 00:04

## 2018-03-10 RX ADMIN — CEFEPIME 100 MILLIGRAM(S): 1 INJECTION, POWDER, FOR SOLUTION INTRAMUSCULAR; INTRAVENOUS at 06:15

## 2018-03-10 RX ADMIN — SODIUM CHLORIDE 50 MILLILITER(S): 9 INJECTION INTRAMUSCULAR; INTRAVENOUS; SUBCUTANEOUS at 12:07

## 2018-03-10 RX ADMIN — Medication 125 MILLIGRAM(S): at 19:26

## 2018-03-10 RX ADMIN — MEROPENEM 100 MILLIGRAM(S): 1 INJECTION INTRAVENOUS at 06:14

## 2018-03-10 RX ADMIN — PANTOPRAZOLE SODIUM 40 MILLIGRAM(S): 20 TABLET, DELAYED RELEASE ORAL at 11:59

## 2018-03-10 RX ADMIN — Medication 125 MILLIGRAM(S): at 06:14

## 2018-03-10 RX ADMIN — Medication 125 MILLIGRAM(S): at 12:52

## 2018-03-10 RX ADMIN — HEPARIN SODIUM 5000 UNIT(S): 5000 INJECTION INTRAVENOUS; SUBCUTANEOUS at 06:45

## 2018-03-10 RX ADMIN — HEPARIN SODIUM 5000 UNIT(S): 5000 INJECTION INTRAVENOUS; SUBCUTANEOUS at 22:23

## 2018-03-10 RX ADMIN — SERTRALINE 100 MILLIGRAM(S): 25 TABLET, FILM COATED ORAL at 12:00

## 2018-03-10 NOTE — PROGRESS NOTE ADULT - ASSESSMENT
Assessment and Plan:   · Assessment		  78y female with PMH listed and recent ex lap, [facility] for SBO 2 weeks ptp with complication of i/a mesenteric hematoma (stable), pw cc diarrhea.  Was admitted to surgery for C diff colitis.  Transferred now to medicine after surgery team deemed patient stable for discharge.  Patient had poor appetite, but was able to tolerate oral diet.  She was evaluated by speech/swallow during hospitalization to ensure no dysphagia.   Patient was planned for discharge on 3/8 when she had desaturation episode into 80's with improvement on nasal cannula.  Found to have new right sided opacities/effusion on XR with P/F ratio 268 on ABG, and was started on empiric abx for aspiration pneumonia.        PLAN :    ACUTE RESPIRATORY FAILURE (P/F RATIO 268) 2/2 LIKELY ASP PNA  --  ddx: r/o pe (low probability pe 2/2 wells score)  --  current o2: 98% on 2L >> 93-95% on ra this am.    --  f/u le duplex for dvt  >> if no DVT, continue with slow tapering of coumadin without bridging.  (spoke with attd)  --  empiric abx: queenie + cefepime  --  pulm eval  --  id eval appreciated, dc abx, check pulseox,      AF (RATE CONTROLLED)  + SUBTHERAPEUTIC INR  --  cardio:  Afib with CHADs Vasc ~7 recommend resuming a/c once cleared by the surgical team  --  surg:   cleared from surgery re: a/c.   --  started coumadin again last night.  inr today 1.04.  d/w attending >>    no briding 2/2 high risk of bleed 2/2 recent ex lap + still present intra-abd hematoma + old age.   Dr. Espinoza @ [phone] will follow up patients coumadin closely himself.  Does not want us to set up home house draws.   C DIFF COLITIS - RESOLVING  --  vanco (day 8), flagyl (day 5)        POOR ORAL INTAKE 2/2 DEPRESSED MOOD (NO DYSPHAGIA)  --  s/s:  mech soft cut up + thin liq      SEIZURES / DEMENTIA / DL / HYPOTHYROID / HTN / DM / CVA W RESIDUAL L HEMIPARESIS  --  cw home regimen >>  can switch back to oral given no dysphagia.   --  check fs, holding oral dm rx, and start insulin sliding scale if necessary.       DVT PPX  CODE STATUS: DNR / DNI (clarified with family/pt today,  forms in chart now)  DISPO:  DC HOME when stable

## 2018-03-10 NOTE — PROVIDER CONTACT NOTE (OTHER) - SITUATION
Notified MD that patient is refusing all P.O meds. Will continue to try to administer the medication. The V/S are stable and pt not in any distress. Pt was due for Flagyl, coumadin, and Lipitor.
Pt refusing to take any meds by mouth. Pt also complaining of chest pain.
bp:187/91 hr: 86
Notified MD of PT /86 HR 96. Pt not complaining of any s/s of discomfort.
Patient complaining of chest pain
Pt has been refusing all PO meds since last night. Pt /80 manually and HR90. Pt not in any distress, just asking to not be bothered. Stating will only take meds from daughter.
bp elevated to 195/88 heart rate of 102, no temp.
bp: 181/79 hr: 87 despite hydralazine administration
no interventions at this time.
PT arrived from the ED, complaining of trouble breathing.

## 2018-03-10 NOTE — PROVIDER CONTACT NOTE (OTHER) - REASON
High BP
Patient complaining of chest pain
Pt refusing P.O Meds / Complaining of chest pain
Pt refusing all PO medication/ High BP
Pt refusing all meds by mouth
blood pressure reassessed.
bp: 181/79 hr: 87
bp:187/91 hr: 86
elevated blood pressure
Pt complaining of trouble breathing

## 2018-03-10 NOTE — PROVIDER CONTACT NOTE (OTHER) - ACTION/TREATMENT ORDERED:
Pt put on 2L NC and given BP meds. Will continue to monitor pt.
standing bp meds administered as per md jhonathan [phone] instruction
As per MD no interventions at this time.
Pt  due for digoxin 0.25mg and lopressor 50mg. Will administer and continue to monitor pt.
STAT EKG ordered
awaiting order for medication intervention.
currently awaiting STAT EKG. EKG tech contacted via spectra.

## 2018-03-10 NOTE — CONSULT NOTE ADULT - SUBJECTIVE AND OBJECTIVE BOX
KASSYERICIRA MCINTYRE  78y, Female  Allergy: No Known Allergies      HPI:  77 yo F w/ PMH/PSH: afib on coumadin, CVA, dementia, DM, HLD, seizure, hypothyroidism. S/p recent ex lap, [facility] for SBO 2 weeks ago with complication of i/a mesenteric hematoma (stable). Was doing well after surgery and discharged on Feb 26 home. 1 day ago started to have diarhea, nonbloody, no fever, no n/v, but abdominal distention. Today complained on abdominal pain. Came with stable vitals, no fever, abdominal distention.    PMH/PSH: PMH/PSH: afib on coumadin, CVA, dementia, DM, HLD, seizure, hypothyroidism. S/p recent ex lap, [facility] for SBO 2 weeks ago with complication of i/a mesenteric hematoma (stable). (02 Mar 2018 03:12)    FAMILY HISTORY:  No pertinent family history in first degree relatives    PAST MEDICAL & SURGICAL HISTORY:  CVA (cerebral vascular accident): with residual L hemiparesis  Seizures  Dementia  High cholesterol  Hypothyroidism, unspecified type  Hypertension, unspecified type  Diabetes  Atrial fibrillation, unspecified type  Mesenteric hematoma  S/P exploratory laparotomy  SBO (small bowel obstruction)  H/O abdominal hysterectomy  S/P percutaneous endoscopic gastrostomy (PEG) tube placement: s/p removal of peg tube        VITALS:  T(F): 97.4, Max: 98.1 (03-09-18 @ 21:30)  HR: 106  BP: 187/91  RR: 18Vital Signs Last 24 Hrs  T(C): 36.3 (10 Mar 2018 05:34), Max: 36.7 (09 Mar 2018 21:30)  T(F): 97.4 (10 Mar 2018 05:34), Max: 98.1 (09 Mar 2018 21:30)  HR: 106 (10 Mar 2018 05:34) (82 - 106)  BP: 187/91 (10 Mar 2018 05:34) (139/63 - 195/88)  BP(mean): --  RR: 18 (10 Mar 2018 05:34) (18 - 18)  SpO2: 95% (10 Mar 2018 06:41) (92% - 95%)    TESTS & MEASUREMENTS:                        9.0    10.80 )-----------( 721      ( 09 Mar 2018 12:25 )             27.4     03-09    136  |  104  |  <5<L>  ----------------------------<  168<H>  4.0   |  24  |  0.4<L>    Ca    7.8<L>      09 Mar 2018 12:25  Mg     1.7     03-09                RADIOLOGY & ADDITIONAL TESTS:    ANTIBIOTICS:  cefepime  IVPB      cefepime  IVPB 1000 milliGRAM(s) IV Intermittent every 8 hours  meropenem  IVPB      meropenem  IVPB 1000 milliGRAM(s) IV Intermittent every 8 hours  vancomycin    Solution 125 milliGRAM(s) Oral every 6 hours

## 2018-03-10 NOTE — PROVIDER CONTACT NOTE (OTHER) - DATE AND TIME:
07-Mar-2018 06:02
08-Mar-2018 06:08
09-Mar-2018 14:51
09-Mar-2018 15:33
09-Mar-2018 22:30
10-Mar-2018 23:00
06-Mar-2018 23:30

## 2018-03-10 NOTE — PROVIDER CONTACT NOTE (OTHER) - NAME OF MD/NP/PA/DO NOTIFIED:
#[id number]
 #[id number]
 #[id number]
 #[id number]
dr tejeda [phone]
dr tejeda- was at the bedside ([phone])
md jhonathan [phone]
 #[id number]

## 2018-03-10 NOTE — PROGRESS NOTE ADULT - SUBJECTIVE AND OBJECTIVE BOX
Patient was seen and examined. Spoke with RN. Chart reviewed. family at bedside extensive discussion    No events overnight.  Vital Signs Last 24 Hrs  T(F): 97.4 (10 Mar 2018 05:34), Max: 98.1 (09 Mar 2018 21:30)  HR: 106 (10 Mar 2018 05:34) (82 - 106)  BP: 187/91 (10 Mar 2018 05:34) (139/63 - 195/88)  SpO2: 95% (10 Mar 2018 08:49) (92% - 95%)  MEDICATIONS  (STANDING):  atorvastatin 10 milliGRAM(s) Oral at bedtime  digoxin     Tablet 0.25 milliGRAM(s) Oral daily  diltiazem    Tablet 120 milliGRAM(s) Oral four times a day  heparin  Injectable 5000 Unit(s) SubCutaneous every 8 hours  levETIRAcetam  IVPB 1000 milliGRAM(s) IV Intermittent every 12 hours  levothyroxine 50 MICROGram(s) Oral at bedtime  metoprolol     tartrate 50 milliGRAM(s) Enteral Tube two times a day  pantoprazole  Injectable 40 milliGRAM(s) IV Push daily  sertraline 100 milliGRAM(s) Oral daily  sodium chloride 0.9%. 1000 milliLiter(s) (50 mL/Hr) IV Continuous <Continuous>  vancomycin    Solution 125 milliGRAM(s) Oral every 6 hours  warfarin 4 milliGRAM(s) Oral daily    MEDICATIONS  (PRN):  ondansetron Injectable 4 milliGRAM(s) IV Push every 6 hours PRN Nausea and/or Vomiting    Labs:                        9.0    10.80 )-----------( 721      ( 09 Mar 2018 12:25 )             27.4     09 Mar 2018 12:25    136    |  104    |  <5     ----------------------------<  168    4.0     |  24     |  0.4      Ca    7.8        09 Mar 2018 12:25  Mg     1.7       09 Mar 2018 12:25      PT/INR - ( 09 Mar 2018 12:25 )   PT: 12.50 sec;   INR: 1.15 ratio         PTT - ( 09 Mar 2018 12:25 )  PTT:24.9 sec        Radiology:    General: comfortable, NAD  Neurology: A&Ox1, nonfocal  Head:  Normocephalic, atraumatic  ENT:  Mucosa moist, no ulcerations  Neck:  Supple, no JVD,   Resp: CTA B/L  CV: RRR, S1S2,   GI: Soft, NT, bowel sounds  MS: No edema, + peripheral pulses, FROM all 4 extremity

## 2018-03-11 LAB
CK MB BLD-MCNC: 5 % — HIGH (ref 0–4)
CK MB CFR SERPL CALC: 1.7 NG/ML — SIGNIFICANT CHANGE UP (ref 0.6–6.3)
CK SERPL-CCNC: 36 U/L — SIGNIFICANT CHANGE UP (ref 0–225)
TROPONIN I SERPL-MCNC: 0.03 NG/ML — SIGNIFICANT CHANGE UP (ref 0–0.05)

## 2018-03-11 RX ORDER — PIPERACILLIN AND TAZOBACTAM 4; .5 G/20ML; G/20ML
3.38 INJECTION, POWDER, LYOPHILIZED, FOR SOLUTION INTRAVENOUS EVERY 8 HOURS
Qty: 0 | Refills: 0 | Status: DISCONTINUED | OUTPATIENT
Start: 2018-03-11 | End: 2018-03-11

## 2018-03-11 RX ORDER — METOPROLOL TARTRATE 50 MG
50 TABLET ORAL
Qty: 0 | Refills: 0 | Status: DISCONTINUED | OUTPATIENT
Start: 2018-03-11 | End: 2018-03-13

## 2018-03-11 RX ORDER — SERTRALINE 25 MG/1
100 TABLET, FILM COATED ORAL DAILY
Qty: 0 | Refills: 0 | Status: DISCONTINUED | OUTPATIENT
Start: 2018-03-11 | End: 2018-03-13

## 2018-03-11 RX ORDER — HYDRALAZINE HCL 50 MG
50 TABLET ORAL ONCE
Qty: 0 | Refills: 0 | Status: COMPLETED | OUTPATIENT
Start: 2018-03-11 | End: 2018-03-11

## 2018-03-11 RX ORDER — DIGOXIN 250 MCG
0.25 TABLET ORAL DAILY
Qty: 0 | Refills: 0 | Status: DISCONTINUED | OUTPATIENT
Start: 2018-03-11 | End: 2018-03-13

## 2018-03-11 RX ORDER — PIPERACILLIN AND TAZOBACTAM 4; .5 G/20ML; G/20ML
3.38 INJECTION, POWDER, LYOPHILIZED, FOR SOLUTION INTRAVENOUS EVERY 6 HOURS
Qty: 0 | Refills: 0 | Status: DISCONTINUED | OUTPATIENT
Start: 2018-03-11 | End: 2018-03-13

## 2018-03-11 RX ADMIN — Medication 125 MILLIGRAM(S): at 11:40

## 2018-03-11 RX ADMIN — LEVETIRACETAM 400 MILLIGRAM(S): 250 TABLET, FILM COATED ORAL at 05:10

## 2018-03-11 RX ADMIN — Medication 50 MILLIGRAM(S): at 05:04

## 2018-03-11 RX ADMIN — Medication 125 MILLIGRAM(S): at 17:56

## 2018-03-11 RX ADMIN — HEPARIN SODIUM 5000 UNIT(S): 5000 INJECTION INTRAVENOUS; SUBCUTANEOUS at 05:05

## 2018-03-11 RX ADMIN — LEVETIRACETAM 400 MILLIGRAM(S): 250 TABLET, FILM COATED ORAL at 17:56

## 2018-03-11 RX ADMIN — SERTRALINE 100 MILLIGRAM(S): 25 TABLET, FILM COATED ORAL at 11:40

## 2018-03-11 RX ADMIN — PIPERACILLIN AND TAZOBACTAM 200 GRAM(S): 4; .5 INJECTION, POWDER, LYOPHILIZED, FOR SOLUTION INTRAVENOUS at 20:28

## 2018-03-11 RX ADMIN — Medication 50 MILLIGRAM(S): at 04:58

## 2018-03-11 RX ADMIN — Medication 0.25 MILLIGRAM(S): at 05:03

## 2018-03-11 RX ADMIN — Medication 50 MILLIGRAM(S): at 17:56

## 2018-03-11 RX ADMIN — SODIUM CHLORIDE 50 MILLILITER(S): 9 INJECTION INTRAMUSCULAR; INTRAVENOUS; SUBCUTANEOUS at 07:35

## 2018-03-11 RX ADMIN — PANTOPRAZOLE SODIUM 40 MILLIGRAM(S): 20 TABLET, DELAYED RELEASE ORAL at 11:40

## 2018-03-11 RX ADMIN — Medication 125 MILLIGRAM(S): at 05:06

## 2018-03-11 NOTE — CHART NOTE - NSCHARTNOTEFT_GEN_A_CORE
Registered Dietitian Follow-Up     Patient Profile Reviewed                           Yes [x]   No []     Nutrition History Previously Obtained        Yes [x]  No []       Pertinent Subjective Information:     Pertinent Medical Interventions: Presented with diarrhea 2/2 C-Diff positive (resolving), currently on contact isolation. Admitted for SBO. Hospital course is complicated by acute respiratory failure 2/2 aspiration pneumonia, desaturation in the 80s with improvement after the patient received a nasal cannula, right sided opacities and effusion as per chest x-ray, subtherapeutic INR, currently in DNR status. s/p swallow evaluation (3/7), recs include provide a level 2: mechanical soft diet with thin liquids which was taken.     Diet order: (3/7) Dysphagia 2: Mechanical Soft with thin liquids and Glucerna Shake Q8hrs     Anthropometrics:  - Ht: 5'3"  - Previous Wt: 43kg  - Current Wt: 42.2kg  - %wt change: 2%   - BMI: 16 (Underweight)  - IBW: 52kg     Pertinent Lab Data: (3/10) Na-139, K-3, CL-106, Glucose-173mg/dL, Corrected Ca-9.2 (WNL), H/H9/27.1     Pertinent Meds: 0.9% NS at 50mL/hr, Lopressor, Cardizem, Keppra, Protonix, Synthroid, Zofran, Lipitor, Coumadin, Heparin, Vancomycin     Physical Findings:  - Appearance: Appears small but not underweight, question accuracy of ht/wt  - GI function: Noted to have diarrhea  - Tubes: N/A  - Oral/Mouth cavity: The patient is cleared for a mechanical soft diet with thin liquids as per SLP (3/7).  - Skin: Intact (Ezekiel Score-14)     Nutrition Requirements  Weight Used: 42.2kg (IBW)     Estimated Energy Needs    Continue []  Adjust [x]  Adjusted Energy Recommendations: MSJ-[id number] x AF 1.3-1.8=2827-1992nzem/day        Estimated Protein Needs    Continue []  Adjust [x]  Adjusted Protein Recommendations: 63-84grams/day (1.5-2grams/kg of IBW)        Estimated Fluid Needs        Continue []  Adjust [x]  Adjusted Fluid Recommendations: 1247-1439mL/day (1mL/kcal)     Nutrient Intake: Per RN, the patient consumes  50% of meals.        [x] Previous Nutrition Diagnosis:            [x] Ongoing          [] Resolved              Inadequate protein-energy intake    [] No active nutrition diagnosis identified at this time     Nutrition Diagnostic #1  Problem:  Etiology:  Statement:     Nutrition Diagnostic #2  Problem:  Etiology:  Statement:     Nutrition Intervention:  1.Meals and Snacks  2.Medical Food Supplement     Goal/Expected Outcome:  1.Consume/tolerate 75% of meals in 3 days  2.Glucose trends stabilized in 3 days     Indicator/Monitorin.Change in clinical status, MD plan, diet regimen, PO intake, BM, skin integrity, weights, hydration status, nutrition related labs.

## 2018-03-11 NOTE — PROGRESS NOTE ADULT - ASSESSMENT
Clincally worse.   CXR with a possible loculated effusion on the right with opacity on that side.  Will treat as a PNA.  R/O ACS.    Check cardiac enzymes/EKG/BNP.  Zosyn 3.375mg q6h.

## 2018-03-11 NOTE — PROGRESS NOTE ADULT - SUBJECTIVE AND OBJECTIVE BOX
KASSYERICIRA MCINTYRE  78y, Female      OVERNIGHT EVENTS:    has chest pain, nausea, no radiation of chest pain.    VITALS:  T(F): 97.8, Max: 97.8 (03-10-18 @ 20:36)  HR: 85  BP: 170/60  RR: 18Vital Signs Last 24 Hrs  T(C): 36.6 (11 Mar 2018 04:43), Max: 36.6 (10 Mar 2018 20:36)  T(F): 97.8 (11 Mar 2018 04:43), Max: 97.8 (10 Mar 2018 20:36)  HR: 85 (11 Mar 2018 04:43) (82 - 85)  BP: 170/60 (11 Mar 2018 07:03) (160/70 - 200/130)  BP(mean): --  RR: 18 (11 Mar 2018 04:43) (16 - 18)  SpO2: 97% (11 Mar 2018 04:43) (95% - 97%)    TESTS & MEASUREMENTS:                        9.0    9.98  )-----------( 704      ( 10 Mar 2018 11:01 )             27.1     03-10    139  |  106  |  <5<L>  ----------------------------<  173<H>  3.0<L>   |  27  |  0.4<L>    Ca    7.9<L>      10 Mar 2018 11:01  Mg     1.9     03-10                RADIOLOGY & ADDITIONAL TESTS:    ANTIBIOTICS:  vancomycin    Solution 125 milliGRAM(s) Oral every 6 hours

## 2018-03-11 NOTE — PROGRESS NOTE ADULT - SUBJECTIVE AND OBJECTIVE BOX
Patient was seen and examined. Spoke with RN. Chart reviewed.  No events overnight. Refusing all meds  Vital Signs Last 24 Hrs  T(F): 97.8 (11 Mar 2018 04:43), Max: 97.8 (10 Mar 2018 20:36)  HR: 85 (11 Mar 2018 04:43) (82 - 85)  BP: 170/60 (11 Mar 2018 07:03) (160/70 - 200/130)  SpO2: 97% (11 Mar 2018 04:43) (95% - 97%)  MEDICATIONS  (STANDING):  atorvastatin 10 milliGRAM(s) Oral at bedtime  digoxin     Tablet 0.25 milliGRAM(s) Oral daily  diltiazem    Tablet 120 milliGRAM(s) Oral four times a day  heparin  Injectable 5000 Unit(s) SubCutaneous every 8 hours  levETIRAcetam  IVPB 1000 milliGRAM(s) IV Intermittent every 12 hours  levothyroxine 50 MICROGram(s) Oral at bedtime  metoprolol     tartrate 50 milliGRAM(s) Enteral Tube two times a day  pantoprazole  Injectable 40 milliGRAM(s) IV Push daily  sertraline 100 milliGRAM(s) Oral daily  sodium chloride 0.9%. 1000 milliLiter(s) (50 mL/Hr) IV Continuous <Continuous>  vancomycin    Solution 125 milliGRAM(s) Oral every 6 hours  warfarin 4 milliGRAM(s) Oral daily    MEDICATIONS  (PRN):  ondansetron Injectable 4 milliGRAM(s) IV Push every 6 hours PRN Nausea and/or Vomiting    Labs:                        9.0    9.98  )-----------( 704      ( 10 Mar 2018 11:01 )             27.1                         9.0    10.80 )-----------( 721      ( 09 Mar 2018 12:25 )             27.4     10 Mar 2018 11:01    139    |  106    |  <5     ----------------------------<  173    3.0     |  27     |  0.4    09 Mar 2018 12:25    136    |  104    |  <5     ----------------------------<  168    4.0     |  24     |  0.4      Ca    7.9        10 Mar 2018 11:01  Ca    7.8        09 Mar 2018 12:25  Mg     1.9       10 Mar 2018 11:01  Mg     1.7       09 Mar 2018 12:25      PT/INR - ( 10 Mar 2018 11:01 )   PT: 14.20 sec;   INR: 1.31 ratio         PTT - ( 09 Mar 2018 12:25 )  PTT:24.9 sec      General: comfortable, NAD  Neurology:  nonfocal  Head:  Normocephalic, atraumatic  ENT:  Mucosa moist, no ulcerations  Neck:  Supple, no JVD,   Skin: no breakdowns (as per RN)  Resp: CTA B/L  CV: RRR, S1S2,   GI: Soft, NT, bowel sounds  MS: No edema, + peripheral pulses,       A/P:  78y female with PMH listed and recent ex lap, [facility] for SBO 2 weeks ptp with complication of i/a mesenteric hematoma (stable), pw cc diarrhea.  Was admitted to surgery for C diff colitis.  Transferred now to medicine after surgery team deemed patient stable for discharge.  Patient had poor appetite, but was able to tolerate oral diet.  She was evaluated by speech/swallow during hospitalization to ensure no dysphagia.   Patient was planned for discharge on 3/8 when she had desaturation episode into 80's with improvement on nasal cannula.  Found to have new right sided opacities/effusion on XR with P/F ratio 268 on ABG, and was started on empiric abx for aspiration pneumonia.        PLAN :    ACUTE RESPIRATORY FAILURE (P/F RATIO 268) 2/2 LIKELY ASP PNA  --  ddx: r/o pe (low probability pe 2/2 wells score)  --  current o2: 98% on 2L >> 93-95% on ra this am.     ID treating as a PNA. Zosyn 3.375mg q6h.  Also on abx for cdiff    OOB to chair    encourage PO meds    Cardiology f/u- please call    INR goal 2-3    DVT prophylaxis  Decubitus prevention- all measures as per RN protocol  Please call or text me with any questions or updates

## 2018-03-12 LAB
ANION GAP SERPL CALC-SCNC: 8 MMOL/L — SIGNIFICANT CHANGE UP (ref 7–14)
B-TYPE NATRIURETIC PEPTIDE BNP RESULT: 427 PG/ML — HIGH (ref 0–99)
BASOPHILS # BLD AUTO: 0.06 K/UL — SIGNIFICANT CHANGE UP (ref 0–0.2)
BASOPHILS NFR BLD AUTO: 0.6 % — SIGNIFICANT CHANGE UP (ref 0–1)
BUN SERPL-MCNC: <5 MG/DL — LOW (ref 10–20)
CALCIUM SERPL-MCNC: 8.2 MG/DL — LOW (ref 8.5–10.1)
CHLORIDE SERPL-SCNC: 107 MMOL/L — SIGNIFICANT CHANGE UP (ref 98–110)
CO2 SERPL-SCNC: 24 MMOL/L — SIGNIFICANT CHANGE UP (ref 17–32)
CREAT SERPL-MCNC: 0.5 MG/DL — LOW (ref 0.7–1.5)
EOSINOPHIL # BLD AUTO: 0.26 K/UL — SIGNIFICANT CHANGE UP (ref 0–0.7)
EOSINOPHIL NFR BLD AUTO: 2.7 % — SIGNIFICANT CHANGE UP (ref 0–8)
GLUCOSE SERPL-MCNC: 111 MG/DL — HIGH (ref 70–110)
HCT VFR BLD CALC: 29.3 % — LOW (ref 37–47)
HGB BLD-MCNC: 9.3 G/DL — LOW (ref 12–16)
IMM GRANULOCYTES NFR BLD AUTO: 0.4 % — HIGH (ref 0.1–0.3)
INR BLD: 1.72 RATIO — HIGH (ref 0.65–1.3)
LYMPHOCYTES # BLD AUTO: 1.74 K/UL — SIGNIFICANT CHANGE UP (ref 1.2–3.4)
LYMPHOCYTES # BLD AUTO: 18.3 % — LOW (ref 20.5–51.1)
MAGNESIUM SERPL-MCNC: 1.7 MG/DL — LOW (ref 1.8–2.4)
MCHC RBC-ENTMCNC: 27.1 PG — SIGNIFICANT CHANGE UP (ref 27–31)
MCHC RBC-ENTMCNC: 31.7 G/DL — LOW (ref 32–37)
MCV RBC AUTO: 85.4 FL — SIGNIFICANT CHANGE UP (ref 81–99)
MONOCYTES # BLD AUTO: 1.14 K/UL — HIGH (ref 0.1–0.6)
MONOCYTES NFR BLD AUTO: 12 % — HIGH (ref 1.7–9.3)
NEUTROPHILS # BLD AUTO: 6.27 K/UL — SIGNIFICANT CHANGE UP (ref 1.4–6.5)
NEUTROPHILS NFR BLD AUTO: 66 % — SIGNIFICANT CHANGE UP (ref 42.2–75.2)
PLATELET # BLD AUTO: 656 K/UL — HIGH (ref 130–400)
POTASSIUM SERPL-MCNC: 3.8 MMOL/L — SIGNIFICANT CHANGE UP (ref 3.5–5)
POTASSIUM SERPL-SCNC: 3.8 MMOL/L — SIGNIFICANT CHANGE UP (ref 3.5–5)
PROTHROM AB SERPL-ACNC: 18.8 SEC — HIGH (ref 9.95–12.87)
RBC # BLD: 3.43 M/UL — LOW (ref 4.2–5.4)
RBC # FLD: 25.5 % — HIGH (ref 11.5–14.5)
SODIUM SERPL-SCNC: 139 MMOL/L — SIGNIFICANT CHANGE UP (ref 135–146)
WBC # BLD: 9.51 K/UL — SIGNIFICANT CHANGE UP (ref 4.8–10.8)
WBC # FLD AUTO: 9.51 K/UL — SIGNIFICANT CHANGE UP (ref 4.8–10.8)

## 2018-03-12 RX ORDER — WARFARIN SODIUM 2.5 MG/1
4 TABLET ORAL ONCE
Qty: 0 | Refills: 0 | Status: COMPLETED | OUTPATIENT
Start: 2018-03-12 | End: 2018-03-12

## 2018-03-12 RX ORDER — MAGNESIUM SULFATE 500 MG/ML
2 VIAL (ML) INJECTION ONCE
Qty: 0 | Refills: 0 | Status: COMPLETED | OUTPATIENT
Start: 2018-03-12 | End: 2018-03-12

## 2018-03-12 RX ORDER — VANCOMYCIN HCL 1 G
125 VIAL (EA) INTRAVENOUS EVERY 6 HOURS
Qty: 0 | Refills: 0 | Status: DISCONTINUED | OUTPATIENT
Start: 2018-03-12 | End: 2018-03-13

## 2018-03-12 RX ORDER — FUROSEMIDE 40 MG
40 TABLET ORAL
Qty: 0 | Refills: 0 | Status: COMPLETED | OUTPATIENT
Start: 2018-03-12 | End: 2018-03-12

## 2018-03-12 RX ORDER — LEVETIRACETAM 250 MG/1
1000 TABLET, FILM COATED ORAL
Qty: 0 | Refills: 0 | Status: DISCONTINUED | OUTPATIENT
Start: 2018-03-12 | End: 2018-03-13

## 2018-03-12 RX ADMIN — PANTOPRAZOLE SODIUM 40 MILLIGRAM(S): 20 TABLET, DELAYED RELEASE ORAL at 11:39

## 2018-03-12 RX ADMIN — Medication 125 MILLIGRAM(S): at 18:14

## 2018-03-12 RX ADMIN — LEVETIRACETAM 400 MILLIGRAM(S): 250 TABLET, FILM COATED ORAL at 06:05

## 2018-03-12 RX ADMIN — Medication 40 MILLIGRAM(S): at 18:13

## 2018-03-12 RX ADMIN — LEVETIRACETAM 1000 MILLIGRAM(S): 250 TABLET, FILM COATED ORAL at 18:12

## 2018-03-12 RX ADMIN — Medication 40 MILLIGRAM(S): at 11:38

## 2018-03-12 RX ADMIN — Medication 125 MILLIGRAM(S): at 11:39

## 2018-03-12 RX ADMIN — SODIUM CHLORIDE 50 MILLILITER(S): 9 INJECTION INTRAMUSCULAR; INTRAVENOUS; SUBCUTANEOUS at 06:34

## 2018-03-12 RX ADMIN — HEPARIN SODIUM 5000 UNIT(S): 5000 INJECTION INTRAVENOUS; SUBCUTANEOUS at 13:49

## 2018-03-12 RX ADMIN — SERTRALINE 100 MILLIGRAM(S): 25 TABLET, FILM COATED ORAL at 11:39

## 2018-03-12 RX ADMIN — ONDANSETRON 4 MILLIGRAM(S): 8 TABLET, FILM COATED ORAL at 18:14

## 2018-03-12 RX ADMIN — Medication 50 GRAM(S): at 13:48

## 2018-03-12 RX ADMIN — Medication 50 MILLIGRAM(S): at 18:12

## 2018-03-12 NOTE — PROGRESS NOTE ADULT - SUBJECTIVE AND OBJECTIVE BOX
Patient was seen and examined. Spoke with RN. Chart reviewed.  No events overnight. Still refusing to take meds  Vital Signs Last 24 Hrs  T(F): 97.6 (12 Mar 2018 05:34), Max: 98.4 (11 Mar 2018 22:44)  HR: 80 (12 Mar 2018 05:34) (55 - 80)  BP: 179/82 (12 Mar 2018 05:34) (131/62 - 179/82)  SpO2: 94% (11 Mar 2018 08:14) (94% - 94%)  MEDICATIONS  (STANDING):  atorvastatin 10 milliGRAM(s) Oral at bedtime  digoxin     Tablet 0.25 milliGRAM(s) Oral daily  diltiazem    Tablet 120 milliGRAM(s) Oral four times a day  heparin  Injectable 5000 Unit(s) SubCutaneous every 8 hours  levETIRAcetam  IVPB 1000 milliGRAM(s) IV Intermittent every 12 hours  levothyroxine 50 MICROGram(s) Oral at bedtime  metoprolol     tartrate 50 milliGRAM(s) Enteral Tube two times a day  pantoprazole  Injectable 40 milliGRAM(s) IV Push daily  piperacillin/tazobactam IVPB. 3.375 Gram(s) IV Intermittent every 6 hours  sertraline 100 milliGRAM(s) Oral daily  sodium chloride 0.9%. 1000 milliLiter(s) (50 mL/Hr) IV Continuous <Continuous>  vancomycin    Solution 125 milliGRAM(s) Oral every 6 hours    MEDICATIONS  (PRN):  ondansetron Injectable 4 milliGRAM(s) IV Push every 6 hours PRN Nausea and/or Vomiting    Labs:                        9.0    9.98  )-----------( 704      ( 10 Mar 2018 11:01 )             27.1     10 Mar 2018 11:01    139    |  106    |  <5     ----------------------------<  173    3.0     |  27     |  0.4      Ca    7.9        10 Mar 2018 11:01  Mg     1.9       10 Mar 2018 11:01      PT/INR - ( 10 Mar 2018 11:01 )   PT: 14.20 sec;   INR: 1.31 ratio               General: comfortable, NAD  Neurology: A&Ox3, nonfocal  Head:  Normocephalic, atraumatic  ENT:  Mucosa moist, no ulcerations  Neck:  Supple, no JVD,   Skin: no breakdowns (as per RN)  Resp: CTA B/L  CV: RRR, S1S2,   GI: Soft, NT, bowel sounds  MS: No edema, + peripheral pulses, FROM all 4 extremity      A/P:  78y female with PMH listed and recent ex lap, [facility] for SBO 2 weeks ptp with complication of i/a mesenteric hematoma (stable), pw cc diarrhea.  Was admitted to surgery for C diff colitis. She was evaluated by speech/swallow during hospitalization to ensure no dysphagia.   Patient was planned for discharge on 3/8 when she had desaturation episode into 80's with improvement on nasal cannula.  Found to have new right sided opacities/effusion on XR with P/F ratio 268 on ABG, and was started on empiric abx for aspiration pneumonia.        PLAN :    ACUTE RESPIRATORY FAILURE (P/F RATIO 268) 2/2 LIKELY ASP PNA  --  ddx: r/o pe (low probability pe 2/2 wells score)  --  current o2: 98% on 2L >> 93-95% on ra this am.     ID treating as a PNA. Zosyn 3.375mg q6h.  Also on abx for cdiff    ID f/u    Pulm eval    OOB to chair    encourage PO meds    Cardiology f/u- please call    DC when cleared by pulm and cardio    INR goal 2-3  DVT prophylaxis  Decubitus prevention- all measures as per RN protocol  Please call or text me with any questions or updates

## 2018-03-12 NOTE — PROGRESS NOTE ADULT - ASSESSMENT
78y female with PMH listed and recent ex lap, [facility] for SBO 2 weeks ptp with complication of i/a mesenteric hematoma (stable), pw cc diarrhea.  Was admitted to surgery for C diff colitis.  Transferred now to medicine after surgery team deemed patient stable for discharge.  Patient had poor appetite, but was able to tolerate oral diet.  She was evaluated by speech/swallow during hospitalization to ensure no dysphagia.   Patient was planned for discharge on 3/8 when she had desaturation episode into 80's with improvement on nasal cannula.  Found to have new right sided opacities/effusion on XR with P/F ratio 268 on ABG, and was started on empiric abx for aspiration pneumonia.        PLAN :    ACUTE RESPIRATORY FAILURE 2/2 ASP PNA  --  will repeat cxr this am.   --  s/s:  mech soft cut up + thin liq  --  id:  Zosyn 3.375mg q6h (started 3/11)  --  pulm eval pending      AF (RATE CONTROLLED)  + SUBTHERAPEUTIC INR  --  cardio:  Afib with CHADs Vasc ~7; resume  a/c once cleared by the surgical team  --  surg:   cleared from surgery re: a/c.   --  check daily inr / dose coumadin >>  will go up slowly without bridging 2/2 high risk bleeding      C DIFF COLITIS - RESOLVED  --  vanco course complete >> will d/c today.       SEIZURES / DEMENTIA / DL / HYPOTHYROID / HTN / DM / CVA W RESIDUAL L HEMIPARESIS  --  cw home regimen  --  check fs, holding oral dm rx, and start insulin sliding scale if necessary.       DVT PPX >> on a/c.   CODE STATUS: DNR / DNI  DISPO:  home w vn once stable 78y female with PMH listed and recent ex lap, [facility] for SBO 2 weeks ptp with complication of i/a mesenteric hematoma (stable), pw cc diarrhea.  Was admitted to surgery for C diff colitis.  Transferred now to medicine after surgery team deemed patient stable for discharge.  Patient had poor appetite, but was able to tolerate oral diet.  She was evaluated by speech/swallow during hospitalization to ensure no dysphagia.   Patient was planned for discharge on 3/8 when she had desaturation episode into 80's with improvement on nasal cannula.  Found to have new right sided opacities/effusion on XR with P/F ratio 268 on ABG, and was started on empiric abx for aspiration pneumonia.        PLAN :    ACUTE RESPIRATORY FAILURE 2/2 ASP PNA  --  will repeat cxr this am.   --  s/s:  mech soft cut up + thin liq  --  id:  Zosyn 3.375mg q6h (started 3/11)  --  pulm eval pending      AF (RATE CONTROLLED)  + SUBTHERAPEUTIC INR  --  cardio:  Afib with CHADs Vasc ~7; resume  a/c once cleared by the surgical team  --  surg:   cleared from surgery re: a/c.   --  check daily inr / dose coumadin >>  will go up slowly without bridging 2/2 high risk bleeding      C DIFF COLITIS - RESOLVED  --  vanco (day 12) >>  will continue while patient is on abx for pna      SEIZURES / DEMENTIA / DL / HYPOTHYROID / HTN / DM / CVA W RESIDUAL L HEMIPARESIS  --  cw home regimen  --  check fs, holding oral dm rx, and start insulin sliding scale if necessary.       DVT PPX >> on a/c.   CODE STATUS: DNR / DNI  DISPO:  home w vn once stable

## 2018-03-12 NOTE — PROGRESS NOTE ADULT - SUBJECTIVE AND OBJECTIVE BOX
IRA PACKER 78y Female   MRN#: [id number]    Patient is a 78y old Female who presents with a chief complaint of Diarrhea (08 Mar 2018 11:50)    Currently admitted to medicine with the primary diagnosis of Clostridium difficile colitis     Today is hospital day 10d. This morning she is resting comfortably in bed and reports no new issues or overnight events.     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  Seizures  Dementia  High cholesterol  Hypothyroidism, unspecified type  Hypertension, unspecified type  Diabetes  Atrial fibrillation, unspecified type  Mesenteric hematoma  S/P exploratory laparotomy  SBO (small bowel obstruction)  H/O abdominal hysterectomy  S/P percutaneous endoscopic gastrostomy (PEG) tube placement: s/p removal of peg tube      ALLERGIES:  No Known Allergies      MEDICATIONS:  STANDING MEDICATIONS  atorvastatin 10 milliGRAM(s) Oral at bedtime  digoxin     Tablet 0.25 milliGRAM(s) Oral daily  diltiazem    Tablet 120 milliGRAM(s) Oral four times a day  heparin  Injectable 5000 Unit(s) SubCutaneous every 8 hours  levETIRAcetam  IVPB 1000 milliGRAM(s) IV Intermittent every 12 hours  levothyroxine 50 MICROGram(s) Oral at bedtime  metoprolol     tartrate 50 milliGRAM(s) Enteral Tube two times a day  pantoprazole  Injectable 40 milliGRAM(s) IV Push daily  piperacillin/tazobactam IVPB. 3.375 Gram(s) IV Intermittent every 6 hours  sertraline 100 milliGRAM(s) Oral daily  sodium chloride 0.9%. 1000 milliLiter(s) IV Continuous <Continuous>  vancomycin    Solution 125 milliGRAM(s) Oral every 6 hours    PRN MEDICATIONS  ondansetron Injectable 4 milliGRAM(s) IV Push every 6 hours PRN      VITALS:   T(F): 97.6, Max: 98.4 (03-11-18 @ 22:44)  HR: 80  BP: 179/82  RR: 18  SpO2: --    LABS:         TODAYS LABS DRAWN >>  PENDING RESULTS **                     9.0    9.98  )-----------( 704      ( 10 Mar 2018 11:01 )             27.1     03-10    139  |  106  |  <5<L>  ----------------------------<  173<H>  3.0<L>   |  27  |  0.4<L>    Ca    7.9<L>      10 Mar 2018 11:01  Mg     1.9     03-10      PT/INR - ( 10 Mar 2018 11:01 )   PT: 14.20 sec;   INR: 1.31 ratio               Troponin I, Serum: 0.03 ng/mL (03-11-18 @ 13:11)  Creatine Kinase, Serum: 36 U/L (03-11-18 @ 13:11)      CARDIAC MARKERS ( 11 Mar 2018 13:11 )  0.03 ng/mL / x     / 36 U/L / x     / 1.7 ng/mL        RADIOLOGY:      PHYSICAL EXAM:  Constitutional: non-toxic,  not in acute distress, sitting up in bed,  no complaints of sob / chest pain.   HEENT: eomi,  wnl  Respiratory:   Cardiovascular:  s1, s2,  regular, no murmurs  Gastrointestinal:  nontender, nondistended, +bowel sounds  Extremities: no edema b/l le  Neurological: nonfocal; wnl, aaox3    ------------------------------------------------------------------     VITAL SIGNS   T(F): 97.6 (03-12 @ 05:34), Max: 98.4 (03-11 @ 22:44)  HR: 80 (03-12 @ 05:34)  BP: 179/82 (03-12 @ 05:34)  RR: 18 (03-12 @ 05:34)  SpO2: --

## 2018-03-12 NOTE — PROGRESS NOTE ADULT - ASSESSMENT
S/P exploratory lap for SBO.  Pt with ascites    Recent treatment for C difficile      2/20 CT chest: Scattered bilateral groundglass opacities and   consolidations for example anterior right upper lobe. Bilateral pleural effusions with adjacent atelectasis    Subsequent episode of desaturation    On piperacillin/tazobactam IVPB. 3.375 Gram(s) IV Intermittent every 6 hours x 1 day  Would reinstitute the PO vanco 125 q6h while pt is receiving the Zosyn    Await pulmonary evaluation  BNP  No recent diarrhea: can D/C contact isolation

## 2018-03-12 NOTE — CONSULT NOTE ADULT - ASSESSMENT
IMPRESSION:  b/l effusion secondary to volume overload and hypoalbuminemia  recent abdomen surgery for bowel obstruction  c. diff on vancomycin      PLAN:  iv diuresis 40 mg Q12  repeat x ray in 24 hrs of diuresis  no clinical or radiological sign of PNA  avoid volume overload  BNP  DVT prophylaxis

## 2018-03-12 NOTE — CONSULT NOTE ADULT - SUBJECTIVE AND OBJECTIVE BOX
Patient is a 78y old  Female who presents with a chief complaint of Diarrhea (08 Mar 2018 11:50)      HPI:  77 yo F w/ PMH/PSH: afib on coumadin, CVA, dementia, DM, HLD, seizure, hypothyroidism. S/p recent ex lap, [facility] for SBO 2 weeks ago with complication of i/a mesenteric hematoma (stable). Was doing well after surgery and discharged on Feb 26 home. 1 day ago started to have diarhea, nonbloody, no fever, no n/v, but abdominal distention. Today complained on abdominal pain. Came with stable vitals, no fever, abdominal distention.    PMH/PSH: PMH/PSH: afib on coumadin, CVA, dementia, DM, HLD, seizure, hypothyroidism. S/p recent ex lap, [facility] for SBO 2 weeks ago with complication of i/a mesenteric hematoma (stable). 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  Seizures  Dementia  High cholesterol  Hypothyroidism, unspecified type  Hypertension, unspecified type  Diabetes  Atrial fibrillation, unspecified type  Mesenteric hematoma  S/P exploratory laparotomy  SBO (small bowel obstruction)  H/O abdominal hysterectomy  S/P percutaneous endoscopic gastrostomy (PEG) tube placement: s/p removal of peg tube      FAMILY HISTORY:  No pertinent family history in first degree relatives    Allergies    No Known Allergies    PHYSICAL EXAM  Vital Signs Last 24 Hrs  T(C): 36.4 (12 Mar 2018 05:34), Max: 36.9 (11 Mar 2018 22:44)  T(F): 97.6 (12 Mar 2018 05:34), Max: 98.4 (11 Mar 2018 22:44)  HR: 80 (12 Mar 2018 05:34) (55 - 80)  BP: 179/82 (12 Mar 2018 05:34) (131/62 - 179/82)  BP(mean): --  RR: 18 (12 Mar 2018 05:34) (18 - 18)  SpO2: 92% (12 Mar 2018 09:57) (92% - 95%)    General:    HEENT: AAO x3            Lymph Nodes: No lymphadenapathy  Neck:  Supple  Lungs: Clear bilaterally  Cardiovascular: S1S2  Abdomen: Soft, non tender  Extremities: NO edema or cyanosis  Skin: Intact      03-11-18 @ 07:01  -  03-12-18 @ 07:00  --------------------------------------------------------  IN: 1640 mL / OUT: 0 mL / NET: 1640 mL          LAB:                        9.3    9.51  )-----------( 656      ( 12 Mar 2018 08:46 )             29.3                                               03-10    139  |  106  |  <5<L>  ----------------------------<  173<H>  3.0<L>   |  27  |  0.4<L>    Ca    7.9<L>      10 Mar 2018 11:01  Mg     1.9     03-10        PT/INR - ( 12 Mar 2018 08:46 )   PT: 18.80 sec;   INR: 1.72 ratio                                                        CARDIAC MARKERS ( 11 Mar 2018 13:11 )  0.03 ng/mL / x     / 36 U/L / x     / 1.7 ng/mL                                                                                                                                   MEDICATIONS  (STANDING):  atorvastatin 10 milliGRAM(s) Oral at bedtime  digoxin     Tablet 0.25 milliGRAM(s) Oral daily  diltiazem    Tablet 120 milliGRAM(s) Oral four times a day  heparin  Injectable 5000 Unit(s) SubCutaneous every 8 hours  levETIRAcetam  IVPB 1000 milliGRAM(s) IV Intermittent every 12 hours  levothyroxine 50 MICROGram(s) Oral at bedtime  metoprolol     tartrate 50 milliGRAM(s) Enteral Tube two times a day  pantoprazole  Injectable 40 milliGRAM(s) IV Push daily  piperacillin/tazobactam IVPB. 3.375 Gram(s) IV Intermittent every 6 hours  sertraline 100 milliGRAM(s) Oral daily  sodium chloride 0.9%. 1000 milliLiter(s) (50 mL/Hr) IV Continuous <Continuous>  vancomycin    Solution 125 milliGRAM(s) Oral every 6 hours    MEDICATIONS  (PRN):  ondansetron Injectable 4 milliGRAM(s) IV Push every 6 hours PRN Nausea and/or Vomiting Patient is a 78y old  Female who presents with a chief complaint of Diarrhea (08 Mar 2018 11:50)      HPI:  77 yo F w/ PMH/PSH: afib on coumadin, CVA, dementia, DM, HLD, seizure, hypothyroidism. S/p recent ex lap, [facility] for SBO 2 weeks ago with complication of i/a mesenteric hematoma (stable). Was doing well after surgery and discharged on Feb 26 home. 1 day ago started to have diarhea, nonbloody, no fever, no n/v, but abdominal distention. Today complained on abdominal pain. Came with stable vitals, no fever, abdominal distention. called to evaluate for decrease pox    PMH/PSH: PMH/PSH: afib on coumadin, CVA, dementia, DM, HLD, seizure, hypothyroidism. S/p recent ex lap, [facility] for SBO 2 weeks ago with complication of i/a mesenteric hematoma (stable). 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  Seizures  Dementia  High cholesterol  Hypothyroidism, unspecified type  Hypertension, unspecified type  Diabetes  Atrial fibrillation, unspecified type  Mesenteric hematoma  S/P exploratory laparotomy  SBO (small bowel obstruction)  H/O abdominal hysterectomy  S/P percutaneous endoscopic gastrostomy (PEG) tube placement: s/p removal of peg tube      FAMILY HISTORY:  No pertinent family history in first degree relatives    Allergies    No Known Allergies    PHYSICAL EXAM  Vital Signs Last 24 Hrs  T(C): 36.4 (12 Mar 2018 05:34), Max: 36.9 (11 Mar 2018 22:44)  T(F): 97.6 (12 Mar 2018 05:34), Max: 98.4 (11 Mar 2018 22:44)  HR: 80 (12 Mar 2018 05:34) (55 - 80)  BP: 179/82 (12 Mar 2018 05:34) (131/62 - 179/82)  BP(mean): --  RR: 18 (12 Mar 2018 05:34) (18 - 18)  SpO2: 92% (12 Mar 2018 09:57) (92% - 95%)    General:    HEENT: Awake       Lymph Nodes: No lymphadenapathy  Neck:  Supple  Lungs: dec bs r side  Cardiovascular: S1S2  Abdomen: Soft, non tender  Extremities: NO edema or cyanosis  Skin: Intact      03-11-18 @ 07:01  -  03-12-18 @ 07:00  --------------------------------------------------------  IN: 1640 mL / OUT: 0 mL / NET: 1640 mL          LAB:                        9.3    9.51  )-----------( 656      ( 12 Mar 2018 08:46 )             29.3                                               03-10    139  |  106  |  <5<L>  ----------------------------<  173<H>  3.0<L>   |  27  |  0.4<L>    Ca    7.9<L>      10 Mar 2018 11:01  Mg     1.9     03-10        PT/INR - ( 12 Mar 2018 08:46 )   PT: 18.80 sec;   INR: 1.72 ratio                                                        CARDIAC MARKERS ( 11 Mar 2018 13:11 )  0.03 ng/mL / x     / 36 U/L / x     / 1.7 ng/mL                                                                                                                                   MEDICATIONS  (STANDING):  atorvastatin 10 milliGRAM(s) Oral at bedtime  digoxin     Tablet 0.25 milliGRAM(s) Oral daily  diltiazem    Tablet 120 milliGRAM(s) Oral four times a day  heparin  Injectable 5000 Unit(s) SubCutaneous every 8 hours  levETIRAcetam  IVPB 1000 milliGRAM(s) IV Intermittent every 12 hours  levothyroxine 50 MICROGram(s) Oral at bedtime  metoprolol     tartrate 50 milliGRAM(s) Enteral Tube two times a day  pantoprazole  Injectable 40 milliGRAM(s) IV Push daily  piperacillin/tazobactam IVPB. 3.375 Gram(s) IV Intermittent every 6 hours  sertraline 100 milliGRAM(s) Oral daily  sodium chloride 0.9%. 1000 milliLiter(s) (50 mL/Hr) IV Continuous <Continuous>  vancomycin    Solution 125 milliGRAM(s) Oral every 6 hours    MEDICATIONS  (PRN):  ondansetron Injectable 4 milliGRAM(s) IV Push every 6 hours PRN Nausea and/or Vomiting

## 2018-03-13 VITALS
DIASTOLIC BLOOD PRESSURE: 66 MMHG | RESPIRATION RATE: 18 BRPM | TEMPERATURE: 98 F | HEART RATE: 92 BPM | SYSTOLIC BLOOD PRESSURE: 145 MMHG

## 2018-03-13 LAB
ANION GAP SERPL CALC-SCNC: 9 MMOL/L — SIGNIFICANT CHANGE UP (ref 7–14)
B-TYPE NATRIURETIC PEPTIDE BNP RESULT: 456 PG/ML — HIGH (ref 0–99)
BASOPHILS # BLD AUTO: 0.08 K/UL — SIGNIFICANT CHANGE UP (ref 0–0.2)
BASOPHILS NFR BLD AUTO: 0.9 % — SIGNIFICANT CHANGE UP (ref 0–1)
BUN SERPL-MCNC: <5 MG/DL — LOW (ref 10–20)
CALCIUM SERPL-MCNC: 7.9 MG/DL — LOW (ref 8.5–10.1)
CHLORIDE SERPL-SCNC: 93 MMOL/L — LOW (ref 98–110)
CO2 SERPL-SCNC: 28 MMOL/L — SIGNIFICANT CHANGE UP (ref 17–32)
CREAT SERPL-MCNC: 0.6 MG/DL — LOW (ref 0.7–1.5)
EOSINOPHIL # BLD AUTO: 0.27 K/UL — SIGNIFICANT CHANGE UP (ref 0–0.7)
EOSINOPHIL NFR BLD AUTO: 2.9 % — SIGNIFICANT CHANGE UP (ref 0–8)
GLUCOSE SERPL-MCNC: 140 MG/DL — HIGH (ref 70–110)
HCT VFR BLD CALC: 30.2 % — LOW (ref 37–47)
HGB BLD-MCNC: 9.7 G/DL — LOW (ref 12–16)
IMM GRANULOCYTES NFR BLD AUTO: 0.3 % — SIGNIFICANT CHANGE UP (ref 0.1–0.3)
INR BLD: 1.72 RATIO — HIGH (ref 0.65–1.3)
LYMPHOCYTES # BLD AUTO: 1.84 K/UL — SIGNIFICANT CHANGE UP (ref 1.2–3.4)
LYMPHOCYTES # BLD AUTO: 19.8 % — LOW (ref 20.5–51.1)
MAGNESIUM SERPL-MCNC: 1.8 MG/DL — SIGNIFICANT CHANGE UP (ref 1.8–2.4)
MCHC RBC-ENTMCNC: 27.6 PG — SIGNIFICANT CHANGE UP (ref 27–31)
MCHC RBC-ENTMCNC: 32.1 G/DL — SIGNIFICANT CHANGE UP (ref 32–37)
MCV RBC AUTO: 85.8 FL — SIGNIFICANT CHANGE UP (ref 81–99)
MONOCYTES # BLD AUTO: 1.05 K/UL — HIGH (ref 0.1–0.6)
MONOCYTES NFR BLD AUTO: 11.3 % — HIGH (ref 1.7–9.3)
NEUTROPHILS # BLD AUTO: 6 K/UL — SIGNIFICANT CHANGE UP (ref 1.4–6.5)
NEUTROPHILS NFR BLD AUTO: 64.8 % — SIGNIFICANT CHANGE UP (ref 42.2–75.2)
PLATELET # BLD AUTO: 746 K/UL — HIGH (ref 130–400)
POTASSIUM SERPL-MCNC: 2.8 MMOL/L — LOW (ref 3.5–5)
POTASSIUM SERPL-SCNC: 2.8 MMOL/L — LOW (ref 3.5–5)
PROTHROM AB SERPL-ACNC: 18.8 SEC — HIGH (ref 9.95–12.87)
RBC # BLD: 3.52 M/UL — LOW (ref 4.2–5.4)
RBC # FLD: 25.1 % — HIGH (ref 11.5–14.5)
SODIUM SERPL-SCNC: 130 MMOL/L — LOW (ref 135–146)
WBC # BLD: 9.27 K/UL — SIGNIFICANT CHANGE UP (ref 4.8–10.8)
WBC # FLD AUTO: 9.27 K/UL — SIGNIFICANT CHANGE UP (ref 4.8–10.8)

## 2018-03-13 RX ORDER — POTASSIUM CHLORIDE 20 MEQ
40 PACKET (EA) ORAL ONCE
Qty: 0 | Refills: 0 | Status: DISCONTINUED | OUTPATIENT
Start: 2018-03-13 | End: 2018-03-13

## 2018-03-13 RX ORDER — VANCOMYCIN HCL 1 G
1 VIAL (EA) INTRAVENOUS
Qty: 12 | Refills: 0 | OUTPATIENT
Start: 2018-03-13 | End: 2018-03-15

## 2018-03-13 RX ORDER — POTASSIUM CHLORIDE 20 MEQ
40 PACKET (EA) ORAL EVERY 4 HOURS
Qty: 0 | Refills: 0 | Status: COMPLETED | OUTPATIENT
Start: 2018-03-13 | End: 2018-03-13

## 2018-03-13 RX ORDER — POTASSIUM CHLORIDE 20 MEQ
20 PACKET (EA) ORAL ONCE
Qty: 0 | Refills: 0 | Status: DISCONTINUED | OUTPATIENT
Start: 2018-03-13 | End: 2018-03-13

## 2018-03-13 RX ORDER — FUROSEMIDE 40 MG
40 TABLET ORAL
Qty: 0 | Refills: 0 | Status: DISCONTINUED | OUTPATIENT
Start: 2018-03-13 | End: 2018-03-13

## 2018-03-13 RX ORDER — ACETAMINOPHEN 500 MG
650 TABLET ORAL ONCE
Qty: 0 | Refills: 0 | Status: COMPLETED | OUTPATIENT
Start: 2018-03-13 | End: 2018-03-13

## 2018-03-13 RX ORDER — FUROSEMIDE 40 MG
1 TABLET ORAL
Qty: 30 | Refills: 0 | OUTPATIENT
Start: 2018-03-13 | End: 2018-04-11

## 2018-03-13 RX ADMIN — SODIUM CHLORIDE 50 MILLILITER(S): 9 INJECTION INTRAMUSCULAR; INTRAVENOUS; SUBCUTANEOUS at 02:18

## 2018-03-13 RX ADMIN — Medication 650 MILLIGRAM(S): at 06:55

## 2018-03-13 RX ADMIN — PANTOPRAZOLE SODIUM 40 MILLIGRAM(S): 20 TABLET, DELAYED RELEASE ORAL at 13:00

## 2018-03-13 RX ADMIN — Medication 125 MILLIGRAM(S): at 12:59

## 2018-03-13 RX ADMIN — HEPARIN SODIUM 5000 UNIT(S): 5000 INJECTION INTRAVENOUS; SUBCUTANEOUS at 15:48

## 2018-03-13 RX ADMIN — Medication 125 MILLIGRAM(S): at 06:55

## 2018-03-13 RX ADMIN — Medication 40 MILLIEQUIVALENT(S): at 12:50

## 2018-03-13 RX ADMIN — Medication 40 MILLIGRAM(S): at 09:12

## 2018-03-13 RX ADMIN — SERTRALINE 100 MILLIGRAM(S): 25 TABLET, FILM COATED ORAL at 12:58

## 2018-03-13 RX ADMIN — Medication 40 MILLIEQUIVALENT(S): at 15:48

## 2018-03-13 NOTE — PROGRESS NOTE ADULT - ASSESSMENT
CHF:  pulmonary evaluation no PNA.CHF.  CXR with increasing fluid.  Will give Zosyn for 7 days.  cont with po vanco.

## 2018-03-13 NOTE — PROGRESS NOTE ADULT - SUBJECTIVE AND OBJECTIVE BOX
IRA PACKER 78y Female   MRN#: [id number]    Patient is a 78y old Female who presents with a chief complaint of Diarrhea (08 Mar 2018 11:50)  Currently admitted to medicine with the primary diagnosis of Clostridium difficile colitis  Today is hospital day 11d. This morning she is resting comfortably in bed and reports no new issues or overnight events.     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  Seizures  Dementia  High cholesterol  Hypothyroidism, unspecified type  Hypertension, unspecified type  Diabetes  Atrial fibrillation, unspecified type  Mesenteric hematoma  S/P exploratory laparotomy  SBO (small bowel obstruction)  H/O abdominal hysterectomy  S/P percutaneous endoscopic gastrostomy (PEG) tube placement: s/p removal of peg tube      ALLERGIES:  No Known Allergies      MEDICATIONS:  STANDING MEDICATIONS  atorvastatin 10 milliGRAM(s) Oral at bedtime  digoxin     Tablet 0.25 milliGRAM(s) Oral daily  diltiazem    Tablet 120 milliGRAM(s) Oral four times a day  furosemide   Injectable 40 milliGRAM(s) IV Push two times a day  heparin  Injectable 5000 Unit(s) SubCutaneous every 8 hours  levETIRAcetam 1000 milliGRAM(s) Oral two times a day  levothyroxine 50 MICROGram(s) Oral at bedtime  metoprolol     tartrate 50 milliGRAM(s) Enteral Tube two times a day  pantoprazole  Injectable 40 milliGRAM(s) IV Push daily  piperacillin/tazobactam IVPB. 3.375 Gram(s) IV Intermittent every 6 hours  sertraline 100 milliGRAM(s) Oral daily  sodium chloride 0.9%. 1000 milliLiter(s) IV Continuous <Continuous>  vancomycin    Solution 125 milliGRAM(s) Oral every 6 hours    PRN MEDICATIONS  ondansetron Injectable 4 milliGRAM(s) IV Push every 6 hours PRN      VITALS:   T(F): 97.4, Max: 98.3 (03-12-18 @ 13:32)  HR: 86  BP: 166/79  RR: 18  SpO2: 92%  -->  on room air:  R index: ~88%,  L pinky: ~89%,  L ear: ~95%    LABS:                        9.3    9.51  )-----------( 656      ( 12 Mar 2018 08:46 )             29.3     03-12    139  |  107  |  <5<L>  ----------------------------<  111<H>  3.8   |  24  |  0.5<L>    Ca    8.2<L>      12 Mar 2018 08:46  Mg     1.7     03-12      PT/INR - ( 12 Mar 2018 08:46 )   PT: 18.80 sec;   INR: 1.72 ratio                   CARDIAC MARKERS ( 11 Mar 2018 13:11 )  0.03 ng/mL / x     / 36 U/L / x     / 1.7 ng/mL        RADIOLOGY:      PHYSICAL EXAM:  Constitutional: non-toxic,  not in acute distress  Respiratory:  lung sounds L > R,  +rhonchi. lung sounds diminished at bases.   Cardiovascular:  s1, s2,  irregular  Gastrointestinal:  nontender, nondistended, +bowel sounds  Extremities: no edema b/l le  Neurological: L hemiparesis @ baseline.

## 2018-03-13 NOTE — PROGRESS NOTE ADULT - ASSESSMENT
78y female with PMH listed and recent ex lap, [facility] for SBO 2 weeks ptp with complication of i/a mesenteric hematoma (stable), pw cc diarrhea.  Was admitted to surgery for C diff colitis.  Transferred now to medicine after surgery team deemed patient stable for discharge.  Patient had poor appetite, but was able to tolerate oral diet.  She was evaluated by speech/swallow during hospitalization to ensure no dysphagia.   Patient was planned for discharge on 3/8 when she had desaturation episode into 80's with improvement on nasal cannula.  Found to have new right sided opacities/effusion on XR with P/F ratio 268 on ABG, and was started on empiric abx for aspiration pneumonia.        PLAN :    ACUTE RESPIRATORY FAILURE 2/2 ASP PNA (VS PNEUMONITIS)  + BL PLEURAL EFFUSIONS 2/2 FLUID OVERLOAD  --  will repeat cxr this am.   --  s/s:  mech soft cut up + thin liq  --  id:  Zosyn 3.375mg q6h (started 3/11)  --  pulm:  iv lasix 40 bid x 24h   --  pulm f/u today re: thoracentesis inpatient vs d/c home with lasix and op follow up      AF (RATE CONTROLLED)  + SUBTHERAPEUTIC INR  --  cardio:  Afib with CHADs Vasc ~7; resume  a/c once cleared by the surgical team  --  surg:   cleared from surgery re: a/c.   --  check daily inr / dose coumadin >>  no bridging 2/2 high risk bleeding      C DIFF COLITIS - RESOLVED  --  vanco (day 12) >>  will continue while patient is on abx for pna      SEIZURES / DEMENTIA / DL / HYPOTHYROID / HTN / DM / CVA W RESIDUAL L HEMIPARESIS  --  cw home regimen  --  check fs, holding oral dm rx, and start insulin sliding scale if necessary.       DVT PPX >> on a/c.   CODE STATUS: DNR / DNI  DISPO:  home w vn once stable 78y female with PMH listed and recent ex lap, [facility] for SBO 2 weeks ptp with complication of i/a mesenteric hematoma (stable), pw cc diarrhea.  Was admitted to surgery for C diff colitis.  Transferred now to medicine after surgery team deemed patient stable for discharge.  Patient had poor appetite, but was able to tolerate oral diet.  She was evaluated by speech/swallow during hospitalization to ensure no dysphagia.   Patient was planned for discharge on 3/8 when she had desaturation episode into 80's with improvement on nasal cannula.  Found to have new right sided opacities/effusion on XR with P/F ratio 268 on ABG, and was started on empiric abx for aspiration pneumonia.        PLAN :    ACUTE RESPIRATORY FAILURE 2/2 ASP PNA (VS PNEUMONITIS)  + BL PLEURAL EFFUSIONS 2/2 FLUID OVERLOAD  --  will repeat cxr this am.   --  s/s:  mech soft cut up + thin liq  --  id:  Zosyn 3.375mg q6h x 6d (3/11-3/16)  --  pulm:  iv lasix 40 bid x 24h   --  pulm f/u today re: thoracentesis inpatient vs d/c home with lasix and op follow up      AF (RATE CONTROLLED)  + SUBTHERAPEUTIC INR  --  cardio:  Afib with CHADs Vasc ~7; resume  a/c once cleared by the surgical team  --  surg:   cleared from surgery re: a/c.   --  check daily inr / dose coumadin >>  no bridging 2/2 high risk bleeding      C DIFF COLITIS - RESOLVED  --  vanco (day 12) >>  will continue while patient is on abx for pna      SEIZURES / DEMENTIA / DL / HYPOTHYROID / HTN / DM / CVA W RESIDUAL L HEMIPARESIS  --  cw home regimen  --  check fs, holding oral dm rx, and start insulin sliding scale if necessary.       DVT PPX >> on a/c.   CODE STATUS: DNR / DNI  DISPO:  home w vn once stable

## 2018-03-13 NOTE — PROGRESS NOTE ADULT - SUBJECTIVE AND OBJECTIVE BOX
Patient was seen and examined. Spoke with RN. Chart reviewed.  No events overnight. Off O2 now- no dyspnea  Vital Signs Last 24 Hrs  T(F): 97.4 (13 Mar 2018 06:05), Max: 98.3 (12 Mar 2018 13:32)  HR: 86 (13 Mar 2018 06:05) (86 - 99)  BP: 166/79 (13 Mar 2018 06:05) (156/- - 198/93)  SpO2: 92% (12 Mar 2018 09:57) (92% - 95%)  MEDICATIONS  (STANDING):  atorvastatin 10 milliGRAM(s) Oral at bedtime  digoxin     Tablet 0.25 milliGRAM(s) Oral daily  diltiazem    Tablet 120 milliGRAM(s) Oral four times a day  heparin  Injectable 5000 Unit(s) SubCutaneous every 8 hours  levETIRAcetam 1000 milliGRAM(s) Oral two times a day  levothyroxine 50 MICROGram(s) Oral at bedtime  metoprolol     tartrate 50 milliGRAM(s) Enteral Tube two times a day  pantoprazole  Injectable 40 milliGRAM(s) IV Push daily  piperacillin/tazobactam IVPB. 3.375 Gram(s) IV Intermittent every 6 hours  sertraline 100 milliGRAM(s) Oral daily  sodium chloride 0.9%. 1000 milliLiter(s) (50 mL/Hr) IV Continuous <Continuous>  vancomycin    Solution 125 milliGRAM(s) Oral every 6 hours    MEDICATIONS  (PRN):  ondansetron Injectable 4 milliGRAM(s) IV Push every 6 hours PRN Nausea and/or Vomiting    Labs:                        9.3    9.51  )-----------( 656      ( 12 Mar 2018 08:46 )             29.3     12 Mar 2018 08:46    139    |  107    |  <5     ----------------------------<  111    3.8     |  24     |  0.5      Ca    8.2        12 Mar 2018 08:46  Mg     1.7       12 Mar 2018 08:46      PT/INR - ( 12 Mar 2018 08:46 )   PT: 18.80 sec;   INR: 1.72 ratio               General: comfortable, NAD  Neurology:  nonfocal  Head:  Normocephalic, atraumatic  ENT:  Mucosa moist, no ulcerations  Neck:  Supple, no JVD,   Skin: no breakdowns (as per RN)  Resp: CTA B/L  CV: RRR, S1S2,   GI: Soft, NT, bowel sounds  MS: No edema, + peripheral pulses, FROM all 4 extremity      A/P:  78y female with PMH listed and recent ex lap, [facility] for SBO 2 weeks ptp with complication of i/a mesenteric hematoma (stable), pw cc diarrhea.  Was admitted to surgery for C diff colitis. She was evaluated by speech/swallow during hospitalization to ensure no dysphagia.   Patient was planned for discharge on 3/8 when she had desaturation episode into 80's with improvement on nasal cannula.  Found to have new right sided opacities/effusion on XR with P/F ratio 268 on ABG, and was started on empiric abx for aspiration pneumonia.          ID treating as a PNA.  Zosyn 3.375mg q6h.  ID f/u= ?duration    Also on PO vanco  for cdiff      Pulm f/u- please call     OOB to chair    encourage PO meds    DC when cleared by pulm and ID    INR goal 2-3    DVT prophylaxis  Decubitus prevention- all measures as per RN protocol  Please call or text me with any questions or updates

## 2018-03-13 NOTE — PROGRESS NOTE ADULT - ASSESSMENT
B/L EFFUSION R>L BETTER WITH DIURESIS, SPOKE AT LENGTH WITH DAUGHTER WANT TO TAKE HER HOME, HOLD OFF THORACENTESIS, REPEAT CXR IN 2 WEEKS LASIX PO ONCE DAILY, OP F.UP

## 2018-03-13 NOTE — PROGRESS NOTE ADULT - PROVIDER SPECIALTY LIST ADULT
Infectious Disease
Infectious Disease
Internal Medicine
Pulmonology
Surgery
Trauma Surgery
Internal Medicine
Infectious Disease

## 2018-03-13 NOTE — PROGRESS NOTE ADULT - SUBJECTIVE AND OBJECTIVE BOX
OVERNIGHT EVENTS: looks better daughter at bedside, improved with diuresis    Vital Signs Last 24 Hrs  T(C): 36.3 (13 Mar 2018 06:05), Max: 36.8 (12 Mar 2018 13:32)  T(F): 97.4 (13 Mar 2018 06:05), Max: 98.3 (12 Mar 2018 13:32)  HR: 86 (13 Mar 2018 06:05) (86 - 99)  BP: 166/79 (13 Mar 2018 06:05) (156/- - 198/93)  BP(mean): --  RR: 18 (13 Mar 2018 06:05) (18 - 18)  SpO2: 95%    PHYSICAL EXAMINATION:    GENERAL: The patient is awake and alert in no apparent distress.     HEENT: Head is normocephalic and atraumatic. Extraocular muscles are intact. Mucous membranes are moist.    NECK: Supple.    LUNGS: dec bs r side    HEART: Regular rate and rhythm without murmur.    ABDOMEN: Soft, nontender, and nondistended.      EXTREMITIES: Without any cyanosis, clubbing, rash, lesions or edema.    NEUROLOGIC: Grossly intact.    SKIN: No ulceration or induration present.      LABS:                        9.7    9.27  )-----------( 746      ( 13 Mar 2018 09:11 )             30.2     03-12    139  |  107  |  <5<L>  ----------------------------<  111<H>  3.8   |  24  |  0.5<L>    Ca    8.2<L>      12 Mar 2018 08:46  Mg     1.7     03-12      PT/INR - ( 12 Mar 2018 08:46 )   PT: 18.80 sec;   INR: 1.72 ratio               CARDIAC MARKERS ( 11 Mar 2018 13:11 )  0.03 ng/mL / x     / 36 U/L / x     / 1.7 ng/mL                MICROBIOLOGY:      MEDICATIONS  (STANDING):  atorvastatin 10 milliGRAM(s) Oral at bedtime  digoxin     Tablet 0.25 milliGRAM(s) Oral daily  diltiazem    Tablet 120 milliGRAM(s) Oral four times a day  furosemide   Injectable 40 milliGRAM(s) IV Push two times a day  heparin  Injectable 5000 Unit(s) SubCutaneous every 8 hours  levETIRAcetam 1000 milliGRAM(s) Oral two times a day  levothyroxine 50 MICROGram(s) Oral at bedtime  metoprolol     tartrate 50 milliGRAM(s) Enteral Tube two times a day  pantoprazole  Injectable 40 milliGRAM(s) IV Push daily  piperacillin/tazobactam IVPB. 3.375 Gram(s) IV Intermittent every 6 hours  sertraline 100 milliGRAM(s) Oral daily  sodium chloride 0.9%. 1000 milliLiter(s) (50 mL/Hr) IV Continuous <Continuous>  vancomycin    Solution 125 milliGRAM(s) Oral every 6 hours    MEDICATIONS  (PRN):  ondansetron Injectable 4 milliGRAM(s) IV Push every 6 hours PRN Nausea and/or Vomiting      RADIOLOGY & ADDITIONAL STUDIES:

## 2018-03-13 NOTE — PROGRESS NOTE ADULT - SUBJECTIVE AND OBJECTIVE BOX
KASSYTOMMYIRA MORAN  78y, Female      OVERNIGHT EVENTS:        VITALS:  T(F): 97.4, Max: 98.3 (03-12-18 @ 13:32)  HR: 86  BP: 166/79  RR: 18Vital Signs Last 24 Hrs  T(C): 36.3 (13 Mar 2018 06:05), Max: 36.8 (12 Mar 2018 13:32)  T(F): 97.4 (13 Mar 2018 06:05), Max: 98.3 (12 Mar 2018 13:32)  HR: 86 (13 Mar 2018 06:05) (86 - 99)  BP: 166/79 (13 Mar 2018 06:05) (156/- - 198/93)  BP(mean): --  RR: 18 (13 Mar 2018 06:05) (18 - 18)  SpO2: 92% (12 Mar 2018 09:57) (92% - 95%)    TESTS & MEASUREMENTS:                        9.3    9.51  )-----------( 656      ( 12 Mar 2018 08:46 )             29.3     03-12    139  |  107  |  <5<L>  ----------------------------<  111<H>  3.8   |  24  |  0.5<L>    Ca    8.2<L>      12 Mar 2018 08:46  Mg     1.7     03-12                RADIOLOGY & ADDITIONAL TESTS:    ANTIBIOTICS:  piperacillin/tazobactam IVPB. 3.375 Gram(s) IV Intermittent every 6 hours  vancomycin    Solution 125 milliGRAM(s) Oral every 6 hours

## 2018-03-14 DIAGNOSIS — Z79.01 LONG TERM (CURRENT) USE OF ANTICOAGULANTS: ICD-10-CM

## 2018-03-14 DIAGNOSIS — J96.01 ACUTE RESPIRATORY FAILURE WITH HYPOXIA: ICD-10-CM

## 2018-03-14 DIAGNOSIS — I69.354 HEMIPLEGIA AND HEMIPARESIS FOLLOWING CEREBRAL INFARCTION AFFECTING LEFT NON-DOMINANT SIDE: ICD-10-CM

## 2018-03-14 DIAGNOSIS — A04.72 ENTEROCOLITIS DUE TO CLOSTRIDIUM DIFFICILE, NOT SPECIFIED AS RECURRENT: ICD-10-CM

## 2018-03-14 DIAGNOSIS — R13.10 DYSPHAGIA, UNSPECIFIED: ICD-10-CM

## 2018-03-14 DIAGNOSIS — I48.91 UNSPECIFIED ATRIAL FIBRILLATION: ICD-10-CM

## 2018-03-14 DIAGNOSIS — I10 ESSENTIAL (PRIMARY) HYPERTENSION: ICD-10-CM

## 2018-03-14 DIAGNOSIS — E11.9 TYPE 2 DIABETES MELLITUS WITHOUT COMPLICATIONS: ICD-10-CM

## 2018-03-14 DIAGNOSIS — E78.5 HYPERLIPIDEMIA, UNSPECIFIED: ICD-10-CM

## 2018-03-14 DIAGNOSIS — I69.391 DYSPHAGIA FOLLOWING CEREBRAL INFARCTION: ICD-10-CM

## 2018-03-14 DIAGNOSIS — Z66 DO NOT RESUSCITATE: ICD-10-CM

## 2018-03-14 DIAGNOSIS — F03.90 UNSPECIFIED DEMENTIA WITHOUT BEHAVIORAL DISTURBANCE: ICD-10-CM

## 2018-03-14 DIAGNOSIS — J91.8 PLEURAL EFFUSION IN OTHER CONDITIONS CLASSIFIED ELSEWHERE: ICD-10-CM

## 2018-03-14 DIAGNOSIS — Z90.710 ACQUIRED ABSENCE OF BOTH CERVIX AND UTERUS: ICD-10-CM

## 2018-03-14 DIAGNOSIS — G40.909 EPILEPSY, UNSPECIFIED, NOT INTRACTABLE, WITHOUT STATUS EPILEPTICUS: ICD-10-CM

## 2018-03-14 DIAGNOSIS — J69.0 PNEUMONITIS DUE TO INHALATION OF FOOD AND VOMIT: ICD-10-CM

## 2018-03-14 DIAGNOSIS — K66.1 HEMOPERITONEUM: ICD-10-CM

## 2018-03-14 DIAGNOSIS — E03.9 HYPOTHYROIDISM, UNSPECIFIED: ICD-10-CM

## 2018-03-14 DIAGNOSIS — J96.20 ACUTE AND CHRONIC RESPIRATORY FAILURE, UNSPECIFIED WHETHER WITH HYPOXIA OR HYPERCAPNIA: ICD-10-CM

## 2018-03-14 DIAGNOSIS — E87.70 FLUID OVERLOAD, UNSPECIFIED: ICD-10-CM

## 2018-03-14 DIAGNOSIS — Z90.49 ACQUIRED ABSENCE OF OTHER SPECIFIED PARTS OF DIGESTIVE TRACT: ICD-10-CM

## 2021-03-15 NOTE — ED ADULT TRIAGE NOTE - CHIEF COMPLAINT QUOTE
c/o abdominal pain and distention. s/p bowel resection 2 weeks ago. Hx left hemiparesis from previous stroke
Jurgen, Aisah Reza, Maddie Cantu

## 2021-08-04 NOTE — PATIENT PROFILE ADULT. - --S/S CONSISTENT WITH [employer] UTI
The case was reviewed by Dr. Sharpe. He accepted the patient. Called and scheduled patient at his convenience for 8-18-21 @ 7 am at the [facility].    yes

## 2023-05-11 NOTE — CONSULT NOTE ADULT - ATTENDING COMMENTS
pt seen and examined  agree with fellow's a/p Eye Protection Verbiage: Before proceeding with the stage, a plastic scleral shield was inserted. The globe was anesthetized with a few drops of 1% lidocaine with 1:100,000 epinephrine. Then, an appropriate sized scleral shield was chosen and coated with lacrilube ointment. The shield was gently inserted and left in place for the duration of each stage. After the stage was completed, the shield was gently removed.

## 2024-06-14 NOTE — CONSULT NOTE ADULT - SUBJECTIVE AND OBJECTIVE BOX
Remaining refills from 3/20/24 prescription sent to [facility] mail order pharmacy per patients request.     Nothing further at this time    SICU Consultation Note  =====================================================  HPI: 78y Female  HPI:  79yo F presents with 1 days h/o abdominal pain and vomiting. No bowel function. H/o hysterectomy and PEG placement s/p stroke in 2016. Currently on ASA/Plavix/Coumadin. No chest pain, shortness of breath. Bloody diarrhea 2 days ago. afebrile. CT abd/pelvis showed SBO with transition point, taken to OR, found to have dusky bowel, lysed band and the bowel pinked, no resection done. Extubated post-op, Hg was 6.9 and was trasfused 2 PRBC. Then went into Afib with RVR, was given Metoprolol pushes then started on Cardizem gtt. Roberts scanned today- negative for PE, found to have a possible hematoma (18 Feb 2018 18:45)      PAST MEDICAL & SURGICAL HISTORY:  Seizures  Dementia  High cholesterol  Hypothyroidism, unspecified type  Hypertension, unspecified type  Diabetes  Cerebrovascular accident (CVA), unspecified mechanism  Atrial fibrillation, unspecified type  H/O abdominal hysterectomy  S/P percutaneous endoscopic gastrostomy (PEG) tube placement: s/p removal of peg tube    Home Meds: Home Medications:  aspirin 81 mg oral tablet, chewable:  (18 Feb 2018 08:46)  Diovan 160 mg oral tablet: 1 tab(s) orally once a day (18 Feb 2018 08:46)  Januvia:  (18 Feb 2018 08:46)  levETIRAcetam 1000 mg oral tablet: 1 tab(s) orally 2 times a day (18 Feb 2018 08:46)  levothyroxine 50 mcg (0.05 mg) oral tablet: 1 tab(s) orally once a day (18 Feb 2018 08:46)  Lipitor:  (18 Feb 2018 08:46)  metoprolol tartrate 50 mg oral tablet: 1 tab(s) orally 2 times a day (18 Feb 2018 08:46)  mirtazapine:  (18 Feb 2018 08:46)  Namenda:  (18 Feb 2018 08:46)  omeprazole 20 mg oral delayed release tablet: 1 tab(s) orally once a day (18 Feb 2018 08:46)  Pepcid 20 mg oral tablet: 1 tab(s) orally 2 times a day (18 Feb 2018 08:46)  Plavix: 75 milligram(s) orally (18 Feb 2018 08:46)  Toviaz 8 mg oral tablet, extended release: 1 tab(s) orally once a day (18 Feb 2018 08:46)  traZODone 50 mg oral tablet: 1 tab(s) orally 3 times a day (18 Feb 2018 08:46)  Zoloft 100 mg oral tablet: 1 tab(s) orally once a day (18 Feb 2018 08:46)    Allergies: Allergies    No Known Allergies    Intolerances      Soc:   Advanced Directives: Presumed Full Code     ROS:    REVIEW OF SYSTEMS    [ ] A ten-point review of systems was otherwise negative except as noted.  [ ] Due to altered mental status/intubation, subjective information were not able to be obtained from the patient. History was obtained, to the extent possible, from review of the chart and collateral sources of information.      CURRENT MEDICATIONS:   --------------------------------------------------------------------------------------  Neurologic Medications  levETIRAcetam  IVPB 1000 milliGRAM(s) IV Intermittent every 12 hours  morphine  - Injectable 2 milliGRAM(s) IV Push every 4 hours PRN Severe Pain (7 - 10)    Respiratory Medications    Cardiovascular Medications  diltiazem Infusion 15 mG/Hr IV Continuous <Continuous>  metoprolol    tartrate Injectable 5 milliGRAM(s) IV Push every 6 hours    Gastrointestinal Medications  magnesium sulfate  IVPB 2 Gram(s) IV Intermittent once  pantoprazole  Injectable 40 milliGRAM(s) IV Push daily  sodium chloride 0.9%. 1000 milliLiter(s) IV Continuous <Continuous>    Genitourinary Medications    Hematologic/Oncologic Medications  heparin  Injectable 5000 Unit(s) SubCutaneous every 8 hours    Antimicrobial/Immunologic Medications    Endocrine/Metabolic Medications  levothyroxine Injectable 25 MICROGram(s) IV Push at bedtime    Topical/Other Medications    --------------------------------------------------------------------------------------    VITAL SIGNS, INS/OUTS (last 24 hours):  --------------------------------------------------------------------------------------  ICU Vital Signs Last 24 Hrs  T(C): 37.1 (20 Feb 2018 12:00), Max: 37.2 (20 Feb 2018 06:00)  T(F): 98.7 (20 Feb 2018 12:00), Max: 99 (20 Feb 2018 06:00)  HR: 122 (20 Feb 2018 18:00) (85 - 135)  BP: 115/65 (20 Feb 2018 18:00) (81/80 - 161/80)  BP(mean): 91 (20 Feb 2018 18:00) (91 - 96)  ABP: --  ABP(mean): --  RR: 21 (20 Feb 2018 18:00) (18 - 33)  SpO2: 98% (20 Feb 2018 18:00) (93% - 99%)    I&O's Summary    19 Feb 2018 07:01  -  20 Feb 2018 07:00  --------------------------------------------------------  IN: 337 mL / OUT: 600 mL / NET: -263 mL    20 Feb 2018 07:01  -  20 Feb 2018 18:45  --------------------------------------------------------  IN: 1380 mL / OUT: 0 mL / NET: 1380 mL      --------------------------------------------------------------------------------------    EXAM:  General/Neuro  Exam: Normal, alert, oriented x 3, no focal deficits. PERRLA  ***    Respiratory  Exam: Minimal crackles throughout,  Normal expansion/effort.  ***    Cardiovascular  Exam: S1, S2.  tachycardic  Cardiac Rhythm: Afib    GI  Exam: Abdomen is mildly tense, tender and minimally distended, +BS in all 4 quadrants   Nasogastric tube in place.    Wound: midline incision, dressing mildly soaked on inferior portion  Current Diet:  NPO***    Tubes/Lines/Drains  ***  [x] Peripheral IV    Extremities  Exam: Extremities warm, pink, well-perfused. Palpable DP/PT pulses B/L      Derm:  Exam: Good skin turgor, no skin breakdown.      :   Exam: Voids    LABS  --------------------------------------------------------------------------------------  Labs:  CAPILLARY BLOOD GLUCOSE                          8.3    5.50  )-----------( 258      ( 20 Feb 2018 11:05 )             24.8       Auto Neutrophil %: 55.0 % (02-20-18 @ 11:05)  Auto Immature Granulocyte %: 0.4 % (02-20-18 @ 11:05)    02-20    138  |  108  |  13  ----------------------------<  117<H>  3.1<L>   |  19  |  0.8      Calcium, Total Serum: 8.0 mg/dL (02-20-18 @ 11:05)  Magnesium, Serum: 2.3 mg/dL (02-20-18 @ 11:05)  Phosphorus Level, Serum: 2.1 mg/dL (02-20-18 @ 11:05)      LFTs:     Blood Gas Arterial, Lactate: 0.6 mmoL/L (02-20-18 @ 13:00)  Blood Gas Venous - Lactate: 1.9 mmoL/L (02-18-18 @ 16:40)  Blood Gas Venous - Lactate: 4.8 mmoL/L (02-18-18 @ 11:14)  Lactate, Blood: 4.8 mmol/L (02-18-18 @ 07:59)    ABG - ( 20 Feb 2018 13:00 )  pH: 7.42  /  pCO2: 31    /  pO2: 66    / HCO3: 20    / Base Excess: -4.3  /  SaO2: 95          Coags:     9.90   ----< 0.92    ( 20 Feb 2018 11:05 )     20.4        CARDIAC MARKERS ( 20 Feb 2018 11:05 )  0.17 ng/mL / x     / 482 U/L / x     / 5.1 ng/mL  CARDIAC MARKERS ( 20 Feb 2018 02:54 )  0.03 ng/mL / x     / 485 U/L / x     / 3.4 ng/mL            --------------------------------------------------------------------------------------    OTHER LABS    IMAGING RESULTS    < from: CT Abdomen and Pelvis w/ IV Cont (02.20.18 @ 13:59) >  IMPRESSION:   Since February 18, 2018:    1. Slightly increased, now complex ascites, some components hyperdense   suggestive of blood products/hemoperitoneum. Within center mid-abdomen   7.4 x 4.8 cm more focal hemorrhagic collection versus underdistended   bowel; difficult to differentiate without oral contrast.    2. Interval small bowel resection with multiple residual loops of small   bowel, up to 3.4 cm; likely resolving small bowel obstruction. Multiple   foci of pneumoperitoneum; likely postoperative.     3. Please see separate report for concurrent evaluation of thorax.    < end of copied text >    < from: CT Angio Chest w/ IV Cont (02.20.18 @ 13:56) >  IMPRESSION:  1. No pulmonary embolism.    2. Patchy bilateral groundglass and consolidative opacities. Nonspecific   and may be postinfectious or postinflammatory etiology.    3. Small bilateral pleural effusions with adjacent atelectasis.    < end of copied text >    < from: Transthoracic Echocardiogram (02.20.18 @ 08:27) >  Summary:   1. Left ventricular ejection fraction, by visual estimation, is 60 to   65%.   2. Normal left ventricular size and wall thicknesses, with normal   systolic and diastolic function.   3. Mild aortic regurgitation.   4. Estimated pulmonary artery systolic pressure is 45.1 mmHg assuming a   right atrial pressure of 8 mmHg, which is consistent with mild pulmonary   hypertension.   5.Pulmonary hypertension is present.   6. LA volume Index is 34.2 ml/m² ml/m2.    < end of copied text >    < from: Xray Chest 1 View- PORTABLE-Urgent (02.20.18 @ 05:53) >  Impression:      Interval removal of enteric tube.    Interval development of patchy right lung opacities.    < end of copied text >    ASSESSMENT:  78y Female ***    PLAN:   Neurologic: Hx of dementia, CVA, continue Keppra,  pain control  Respiratory: NC, incentive spirometry  Cardiovascular: Hx of Afib, continue Cardizem gtt  Gastrointestinal/Nutrition: Continue NG tube, NPO  Renal/Genitourinary: Pt is voiding  Hematologic:   Infectious Disease: no ABX  Lines/Tubes: NG tube  Endocrine: Hx of Hypothyroid, continue Synthroid  Disposition: admit to ICU for hemodynamic monitoring     --------------------------------------------------------------------------------------    Critical Care Diagnoses: SICU Consultation Note  =====================================================  HPI:     79yo F presents with 1 days h/o abdominal pain and vomiting. No bowel function. H/o hysterectomy and PEG placement s/p stroke in 2016. Currently on ASA/Plavix/Coumadin. No chest pain, shortness of breath. Bloody diarrhea 2 days ago. afebrile. CT abd/pelvis showed SBO with transition point, taken to OR, found to have dusky bowel, lysed band and the bowel pinked, no resection done. Extubated post-op, Hg was 6.9 and was trasfused 2 PRBC. Then went into Afib with RVR, was given Metoprolol pushes then started on Cardizem gtt. Roberts scanned today- negative for PE, found to have a possible hematoma (18 Feb 2018 18:45)      PAST MEDICAL & SURGICAL HISTORY:  Seizures  Dementia  High cholesterol  Hypothyroidism, unspecified type  Hypertension, unspecified type  Diabetes  Cerebrovascular accident (CVA), unspecified mechanism  Atrial fibrillation, unspecified type  H/O abdominal hysterectomy  S/P percutaneous endoscopic gastrostomy (PEG) tube placement: s/p removal of peg tube    Home Meds: Home Medications:  aspirin 81 mg oral tablet, chewable:  (18 Feb 2018 08:46)  Diovan 160 mg oral tablet: 1 tab(s) orally once a day (18 Feb 2018 08:46)  Januvia:  (18 Feb 2018 08:46)  levETIRAcetam 1000 mg oral tablet: 1 tab(s) orally 2 times a day (18 Feb 2018 08:46)  levothyroxine 50 mcg (0.05 mg) oral tablet: 1 tab(s) orally once a day (18 Feb 2018 08:46)  Lipitor:  (18 Feb 2018 08:46)  metoprolol tartrate 50 mg oral tablet: 1 tab(s) orally 2 times a day (18 Feb 2018 08:46)  mirtazapine:  (18 Feb 2018 08:46)  Namenda:  (18 Feb 2018 08:46)  omeprazole 20 mg oral delayed release tablet: 1 tab(s) orally once a day (18 Feb 2018 08:46)  Pepcid 20 mg oral tablet: 1 tab(s) orally 2 times a day (18 Feb 2018 08:46)  Plavix: 75 milligram(s) orally (18 Feb 2018 08:46)  Toviaz 8 mg oral tablet, extended release: 1 tab(s) orally once a day (18 Feb 2018 08:46)  traZODone 50 mg oral tablet: 1 tab(s) orally 3 times a day (18 Feb 2018 08:46)  Zoloft 100 mg oral tablet: 1 tab(s) orally once a day (18 Feb 2018 08:46)    Allergies: Allergies    No Known Allergies    Intolerances      Soc:   Advanced Directives: Presumed Full Code     ROS:    REVIEW OF SYSTEMS    [x ] A ten-point review of systems was otherwise negative except as noted.  [ ] Due to altered mental status/intubation, subjective information were not able to be obtained from the patient. History was obtained, to the extent possible, from review of the chart and collateral sources of information.      CURRENT MEDICATIONS:   --------------------------------------------------------------------------------------  Neurologic Medications  levETIRAcetam  IVPB 1000 milliGRAM(s) IV Intermittent every 12 hours  morphine  - Injectable 2 milliGRAM(s) IV Push every 4 hours PRN Severe Pain (7 - 10)    Respiratory Medications    Cardiovascular Medications  diltiazem Infusion 15 mG/Hr IV Continuous <Continuous>  metoprolol    tartrate Injectable 5 milliGRAM(s) IV Push every 6 hours    Gastrointestinal Medications  magnesium sulfate  IVPB 2 Gram(s) IV Intermittent once  pantoprazole  Injectable 40 milliGRAM(s) IV Push daily  sodium chloride 0.9%. 1000 milliLiter(s) IV Continuous <Continuous>    Genitourinary Medications    Hematologic/Oncologic Medications  heparin  Injectable 5000 Unit(s) SubCutaneous every 8 hours    Antimicrobial/Immunologic Medications    Endocrine/Metabolic Medications  levothyroxine Injectable 25 MICROGram(s) IV Push at bedtime    Topical/Other Medications    --------------------------------------------------------------------------------------    VITAL SIGNS, INS/OUTS (last 24 hours):  --------------------------------------------------------------------------------------  ICU Vital Signs Last 24 Hrs  T(C): 37.1 (20 Feb 2018 12:00), Max: 37.2 (20 Feb 2018 06:00)  T(F): 98.7 (20 Feb 2018 12:00), Max: 99 (20 Feb 2018 06:00)  HR: 122 (20 Feb 2018 18:00) (85 - 135)  BP: 115/65 (20 Feb 2018 18:00) (81/80 - 161/80)  BP(mean): 91 (20 Feb 2018 18:00) (91 - 96)  ABP: --  ABP(mean): --  RR: 21 (20 Feb 2018 18:00) (18 - 33)  SpO2: 98% (20 Feb 2018 18:00) (93% - 99%)    I&O's Summary    19 Feb 2018 07:01  -  20 Feb 2018 07:00  --------------------------------------------------------  IN: 337 mL / OUT: 600 mL / NET: -263 mL    20 Feb 2018 07:01  -  20 Feb 2018 18:45  --------------------------------------------------------  IN: 1380 mL / OUT: 0 mL / NET: 1380 mL      --------------------------------------------------------------------------------------    EXAM:  General/Neuro  Exam: Normal, alert, oriented x 3, no focal deficits. PERRLA  ***    Respiratory  Exam: Minimal crackles throughout,  Normal expansion/effort.  ***    Cardiovascular  Exam: S1, S2.  tachycardic  Cardiac Rhythm: Afib    GI  Exam: Abdomen is mildly tense, tender and minimally distended, +BS in all 4 quadrants   Nasogastric tube in place.    Wound: midline incision, dressing mildly soaked on inferior portion  Current Diet:  NPO***    Tubes/Lines/Drains  ***  [x] Peripheral IV    Extremities  Exam: Extremities warm, pink, well-perfused. Palpable DP/PT pulses B/L      Derm:  Exam: Good skin turgor, no skin breakdown.      :   Exam: Voids    LABS  --------------------------------------------------------------------------------------  Labs:  CAPILLARY BLOOD GLUCOSE                          8.3    5.50  )-----------( 258      ( 20 Feb 2018 11:05 )             24.8       Auto Neutrophil %: 55.0 % (02-20-18 @ 11:05)  Auto Immature Granulocyte %: 0.4 % (02-20-18 @ 11:05)    02-20    138  |  108  |  13  ----------------------------<  117<H>  3.1<L>   |  19  |  0.8      Calcium, Total Serum: 8.0 mg/dL (02-20-18 @ 11:05)  Magnesium, Serum: 2.3 mg/dL (02-20-18 @ 11:05)  Phosphorus Level, Serum: 2.1 mg/dL (02-20-18 @ 11:05)      LFTs:     Blood Gas Arterial, Lactate: 0.6 mmoL/L (02-20-18 @ 13:00)  Blood Gas Venous - Lactate: 1.9 mmoL/L (02-18-18 @ 16:40)  Blood Gas Venous - Lactate: 4.8 mmoL/L (02-18-18 @ 11:14)  Lactate, Blood: 4.8 mmol/L (02-18-18 @ 07:59)    ABG - ( 20 Feb 2018 13:00 )  pH: 7.42  /  pCO2: 31    /  pO2: 66    / HCO3: 20    / Base Excess: -4.3  /  SaO2: 95          Coags:     9.90   ----< 0.92    ( 20 Feb 2018 11:05 )     20.4        CARDIAC MARKERS ( 20 Feb 2018 11:05 )  0.17 ng/mL / x     / 482 U/L / x     / 5.1 ng/mL  CARDIAC MARKERS ( 20 Feb 2018 02:54 )  0.03 ng/mL / x     / 485 U/L / x     / 3.4 ng/mL            --------------------------------------------------------------------------------------    OTHER LABS    IMAGING RESULTS    < from: CT Abdomen and Pelvis w/ IV Cont (02.20.18 @ 13:59) >  IMPRESSION:   Since February 18, 2018:    1. Slightly increased, now complex ascites, some components hyperdense   suggestive of blood products/hemoperitoneum. Within center mid-abdomen   7.4 x 4.8 cm more focal hemorrhagic collection versus underdistended   bowel; difficult to differentiate without oral contrast.    2. Interval small bowel resection with multiple residual loops of small   bowel, up to 3.4 cm; likely resolving small bowel obstruction. Multiple   foci of pneumoperitoneum; likely postoperative.     3. Please see separate report for concurrent evaluation of thorax.    < end of copied text >    < from: CT Angio Chest w/ IV Cont (02.20.18 @ 13:56) >  IMPRESSION:  1. No pulmonary embolism.    2. Patchy bilateral groundglass and consolidative opacities. Nonspecific   and may be postinfectious or postinflammatory etiology.    3. Small bilateral pleural effusions with adjacent atelectasis.    < end of copied text >    < from: Transthoracic Echocardiogram (02.20.18 @ 08:27) >  Summary:   1. Left ventricular ejection fraction, by visual estimation, is 60 to   65%.   2. Normal left ventricular size and wall thicknesses, with normal   systolic and diastolic function.   3. Mild aortic regurgitation.   4. Estimated pulmonary artery systolic pressure is 45.1 mmHg assuming a   right atrial pressure of 8 mmHg, which is consistent with mild pulmonary   hypertension.   5.Pulmonary hypertension is present.   6. LA volume Index is 34.2 ml/m² ml/m2.    < end of copied text >    < from: Xray Chest 1 View- PORTABLE-Urgent (02.20.18 @ 05:53) >  Impression:      Interval removal of enteric tube.    Interval development of patchy right lung opacities.    < end of copied text >    ASSESSMENT:  78y Female ***    PLAN:   Neurologic: Hx of dementia, CVA, continue Keppra,  pain control  Respiratory: NC, incentive spirometry  Cardiovascular: Hx of Afib, continue Cardizem gtt  Gastrointestinal/Nutrition: Continue NG tube, NPO  Renal/Genitourinary: Pt is voiding  Hematologic:   Infectious Disease: no ABX  Lines/Tubes: NG tube  Endocrine: Hx of Hypothyroid, continue Synthroid  Disposition: admit to ICU for hemodynamic monitoring     --------------------------------------------------------------------------------------    Critical Care Diagnoses:

## 2025-02-28 NOTE — PRE-OP CHECKLIST - BP NONINVASIVE SYSTOLIC (MM HG)
You were seen today for a rash and pain on the left side of your chest.  It is unclear whether this is early onset shingles, as there is 1 singular blister that appears to have popped.  There is also the chance that this could be cellulitis.  Given this, we will treat you for both of these.  You do not appear to need any labs at this time.  If it seems to get worse despite treatment, I would recommend you either get seen by primary care doctor, return here for further treatment and evaluation.   119

## 2025-07-11 NOTE — PROGRESS NOTE ADULT - SUBJECTIVE AND OBJECTIVE BOX
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    SBO(SMALL BOWEL OBSTRUCTION)        ROS:  CONSTITUTIONAL:  Negative fever or chills, feels well, good appetite  EYES:  Negative  blurry vision or double vision  CARDIOVASCULAR:  Negative for chest pain or palpitations  RESPIRATORY:  Negative for cough, wheezing, or SOB   GASTROINTESTINAL:  Negative for nausea, vomiting, diarrhea, constipation, or abdominal pain  GENITOURINARY:  Negative frequency, urgency or dysuria  NEUROLOGIC:  No headache, confusion, dizziness, lightheadedness    Allergies    No Known Allergies    Intolerances        ANTIBIOTICS/RELEVANT:  antimicrobials  piperacillin/tazobactam IVPB. 3.375 Gram(s) IV Intermittent every 6 hours    immunologic:    OTHER:  atorvastatin 10 milliGRAM(s) Oral at bedtime  digoxin     Tablet 0.25 milliGRAM(s) Oral daily  diltiazem    Tablet 120 milliGRAM(s) Oral four times a day  heparin  Injectable 5000 Unit(s) SubCutaneous every 8 hours  levETIRAcetam  IVPB 1000 milliGRAM(s) IV Intermittent every 12 hours  levothyroxine 50 MICROGram(s) Oral at bedtime  metoprolol     tartrate 50 milliGRAM(s) Enteral Tube two times a day  ondansetron Injectable 4 milliGRAM(s) IV Push every 6 hours PRN  pantoprazole  Injectable 40 milliGRAM(s) IV Push daily  sertraline 100 milliGRAM(s) Oral daily  sodium chloride 0.9%. 1000 milliLiter(s) IV Continuous <Continuous>      Objective:  T(F): 97.6 (03-12-18 @ 05:34), Max: 98.4 (03-11-18 @ 22:44)  HR: 80 (03-12-18 @ 05:34) (55 - 80)  BP: 179/82 (03-12-18 @ 05:34) (131/62 - 179/82)  RR: 18 (03-12-18 @ 05:34) (18 - 18)  SpO2: --    PHYSICAL EXAM  Constitutional:Well-developed, well nourished  Eyes:DAVID, EOMI  Ear/Nose/Throat: no oral lesion, no sinus tenderness on percussion	  Neck:no JVD, no lymphadenopathy, supple  Respiratory: CTA elizabeth  Cardiovascular: S1S2 RRR, no murmurs  Gastrointestinal:soft, (+) BS, no HSM  Extremities:no e/e/c    03-10    139  |  106  |  <5<L>  ----------------------------<  173<H>  3.0<L>   |  27  |  0.4<L>    Mg     1.9     03-10                              9.0    9.98  )-----------( 704      ( 10 Mar 2018 11:01 )             27.1       PT/INR - ( 10 Mar 2018 11:01 )   PT: 14.20 sec;   INR: 1.31 ratio DISPLAY PLAN FREE TEXT DISPLAY PLAN FREE TEXT DISPLAY PLAN FREE TEXT